# Patient Record
Sex: MALE | NOT HISPANIC OR LATINO | URBAN - METROPOLITAN AREA
[De-identification: names, ages, dates, MRNs, and addresses within clinical notes are randomized per-mention and may not be internally consistent; named-entity substitution may affect disease eponyms.]

---

## 2018-10-31 ENCOUNTER — APPOINTMENT (RX ONLY)
Dept: URBAN - METROPOLITAN AREA CLINIC 38 | Facility: CLINIC | Age: 68
Setting detail: DERMATOLOGY
End: 2018-10-31

## 2018-10-31 DIAGNOSIS — Z85.828 PERSONAL HISTORY OF OTHER MALIGNANT NEOPLASM OF SKIN: ICD-10-CM

## 2018-10-31 PROCEDURE — ? COUNSELING

## 2018-10-31 PROCEDURE — 99202 OFFICE O/P NEW SF 15 MIN: CPT

## 2018-10-31 ASSESSMENT — LOCATION DETAILED DESCRIPTION DERM
LOCATION DETAILED: RIGHT SUPERIOR HELIX
LOCATION DETAILED: RIGHT SCAPHA

## 2018-10-31 ASSESSMENT — LOCATION ZONE DERM
LOCATION ZONE: EAR
LOCATION ZONE: EAR

## 2018-10-31 ASSESSMENT — LOCATION SIMPLE DESCRIPTION DERM
LOCATION SIMPLE: RIGHT EAR
LOCATION SIMPLE: RIGHT EAR

## 2021-06-01 PROBLEM — M48.061 LUMBAR STENOSIS: Status: ACTIVE | Noted: 2021-06-01

## 2021-07-25 ENCOUNTER — APPOINTMENT (OUTPATIENT)
Dept: RADIOLOGY | Facility: MEDICAL CENTER | Age: 71
End: 2021-07-25
Attending: EMERGENCY MEDICINE
Payer: MEDICARE

## 2021-07-25 ENCOUNTER — HOSPITAL ENCOUNTER (OUTPATIENT)
Facility: MEDICAL CENTER | Age: 71
End: 2021-07-25
Attending: EMERGENCY MEDICINE | Admitting: HOSPITALIST
Payer: MEDICARE

## 2021-07-25 ENCOUNTER — HOSPITAL ENCOUNTER (INPATIENT)
Facility: MEDICAL CENTER | Age: 71
LOS: 2 days | DRG: 247 | End: 2021-07-27
Attending: INTERNAL MEDICINE | Admitting: HOSPITALIST
Payer: MEDICARE

## 2021-07-25 VITALS
HEART RATE: 52 BPM | DIASTOLIC BLOOD PRESSURE: 69 MMHG | TEMPERATURE: 97.8 F | WEIGHT: 162.92 LBS | SYSTOLIC BLOOD PRESSURE: 117 MMHG | RESPIRATION RATE: 18 BRPM | OXYGEN SATURATION: 95 % | BODY MASS INDEX: 24.13 KG/M2 | HEIGHT: 69 IN

## 2021-07-25 DIAGNOSIS — R00.1 BRADYCARDIA: ICD-10-CM

## 2021-07-25 DIAGNOSIS — R07.9 ACUTE CHEST PAIN: ICD-10-CM

## 2021-07-25 DIAGNOSIS — I71.20 THORACIC AORTIC ANEURYSM WITHOUT RUPTURE (HCC): ICD-10-CM

## 2021-07-25 DIAGNOSIS — I21.4 NSTEMI (NON-ST ELEVATED MYOCARDIAL INFARCTION) (HCC): ICD-10-CM

## 2021-07-25 PROBLEM — D69.6 THROMBOCYTOPENIA (HCC): Status: ACTIVE | Noted: 2021-07-25

## 2021-07-25 PROBLEM — R07.89 OTHER CHEST PAIN: Status: ACTIVE | Noted: 2021-07-25

## 2021-07-25 LAB
ALBUMIN SERPL BCP-MCNC: 4.2 G/DL (ref 3.2–4.9)
ALBUMIN/GLOB SERPL: 1.6 G/DL
ALP SERPL-CCNC: 76 U/L (ref 30–99)
ALT SERPL-CCNC: 15 U/L (ref 2–50)
ANION GAP SERPL CALC-SCNC: 12 MMOL/L (ref 7–16)
APTT PPP: 27.3 SEC (ref 24.7–36)
AST SERPL-CCNC: 20 U/L (ref 12–45)
BASOPHILS # BLD AUTO: 1.1 % (ref 0–1.8)
BASOPHILS # BLD: 0.05 K/UL (ref 0–0.12)
BILIRUB SERPL-MCNC: 0.7 MG/DL (ref 0.1–1.5)
BUN SERPL-MCNC: 14 MG/DL (ref 8–22)
CALCIUM SERPL-MCNC: 8.8 MG/DL (ref 8.4–10.2)
CHLORIDE SERPL-SCNC: 105 MMOL/L (ref 96–112)
CO2 SERPL-SCNC: 22 MMOL/L (ref 20–33)
CREAT SERPL-MCNC: 1.02 MG/DL (ref 0.5–1.4)
EKG IMPRESSION: NORMAL
EOSINOPHIL # BLD AUTO: 0.25 K/UL (ref 0–0.51)
EOSINOPHIL NFR BLD: 5.5 % (ref 0–6.9)
ERYTHROCYTE [DISTWIDTH] IN BLOOD BY AUTOMATED COUNT: 43.1 FL (ref 35.9–50)
GLOBULIN SER CALC-MCNC: 2.7 G/DL (ref 1.9–3.5)
GLUCOSE SERPL-MCNC: 94 MG/DL (ref 65–99)
HCT VFR BLD AUTO: 43.7 % (ref 42–52)
HGB BLD-MCNC: 15.2 G/DL (ref 14–18)
IMM GRANULOCYTES # BLD AUTO: 0.01 K/UL (ref 0–0.11)
IMM GRANULOCYTES NFR BLD AUTO: 0.2 % (ref 0–0.9)
INR PPP: 1.04 (ref 0.87–1.13)
LIPASE SERPL-CCNC: 44 U/L (ref 7–58)
LYMPHOCYTES # BLD AUTO: 1.29 K/UL (ref 1–4.8)
LYMPHOCYTES NFR BLD: 28.3 % (ref 22–41)
MCH RBC QN AUTO: 33.8 PG (ref 27–33)
MCHC RBC AUTO-ENTMCNC: 34.8 G/DL (ref 33.7–35.3)
MCV RBC AUTO: 97.1 FL (ref 81.4–97.8)
MONOCYTES # BLD AUTO: 0.53 K/UL (ref 0–0.85)
MONOCYTES NFR BLD AUTO: 11.6 % (ref 0–13.4)
NEUTROPHILS # BLD AUTO: 2.43 K/UL (ref 1.82–7.42)
NEUTROPHILS NFR BLD: 53.3 % (ref 44–72)
NRBC # BLD AUTO: 0 K/UL
NRBC BLD-RTO: 0 /100 WBC
PLATELET # BLD AUTO: 150 K/UL (ref 164–446)
PMV BLD AUTO: 9.8 FL (ref 9–12.9)
POTASSIUM SERPL-SCNC: 4.3 MMOL/L (ref 3.6–5.5)
PROT SERPL-MCNC: 6.9 G/DL (ref 6–8.2)
PROTHROMBIN TIME: 12.8 SEC (ref 12–14.6)
RBC # BLD AUTO: 4.5 M/UL (ref 4.7–6.1)
SODIUM SERPL-SCNC: 139 MMOL/L (ref 135–145)
TROPONIN T SERPL-MCNC: 14 NG/L (ref 6–19)
TROPONIN T SERPL-MCNC: 26 NG/L (ref 6–19)
TROPONIN T SERPL-MCNC: 260 NG/L (ref 6–19)
UFH PPP CHRO-ACNC: <0.1 IU/ML
WBC # BLD AUTO: 4.6 K/UL (ref 4.8–10.8)

## 2021-07-25 PROCEDURE — 99223 1ST HOSP IP/OBS HIGH 75: CPT | Performed by: HOSPITALIST

## 2021-07-25 PROCEDURE — 93005 ELECTROCARDIOGRAM TRACING: CPT | Performed by: EMERGENCY MEDICINE

## 2021-07-25 PROCEDURE — 770020 HCHG ROOM/CARE - TELE (206)

## 2021-07-25 PROCEDURE — 94760 N-INVAS EAR/PLS OXIMETRY 1: CPT

## 2021-07-25 PROCEDURE — 83690 ASSAY OF LIPASE: CPT

## 2021-07-25 PROCEDURE — 85730 THROMBOPLASTIN TIME PARTIAL: CPT

## 2021-07-25 PROCEDURE — 74175 CTA ABDOMEN W/CONTRAST: CPT | Mod: ME

## 2021-07-25 PROCEDURE — 99235 HOSP IP/OBS SAME DATE MOD 70: CPT | Performed by: HOSPITALIST

## 2021-07-25 PROCEDURE — 80053 COMPREHEN METABOLIC PANEL: CPT

## 2021-07-25 PROCEDURE — 85520 HEPARIN ASSAY: CPT

## 2021-07-25 PROCEDURE — 93005 ELECTROCARDIOGRAM TRACING: CPT

## 2021-07-25 PROCEDURE — 700111 HCHG RX REV CODE 636 W/ 250 OVERRIDE (IP): Performed by: EMERGENCY MEDICINE

## 2021-07-25 PROCEDURE — G0378 HOSPITAL OBSERVATION PER HR: HCPCS

## 2021-07-25 PROCEDURE — A9270 NON-COVERED ITEM OR SERVICE: HCPCS | Performed by: HOSPITALIST

## 2021-07-25 PROCEDURE — 99223 1ST HOSP IP/OBS HIGH 75: CPT | Performed by: STUDENT IN AN ORGANIZED HEALTH CARE EDUCATION/TRAINING PROGRAM

## 2021-07-25 PROCEDURE — 99285 EMERGENCY DEPT VISIT HI MDM: CPT

## 2021-07-25 PROCEDURE — A9270 NON-COVERED ITEM OR SERVICE: HCPCS | Performed by: EMERGENCY MEDICINE

## 2021-07-25 PROCEDURE — 96374 THER/PROPH/DIAG INJ IV PUSH: CPT

## 2021-07-25 PROCEDURE — 700111 HCHG RX REV CODE 636 W/ 250 OVERRIDE (IP): Performed by: HOSPITALIST

## 2021-07-25 PROCEDURE — 96376 TX/PRO/DX INJ SAME DRUG ADON: CPT | Mod: XU

## 2021-07-25 PROCEDURE — 700117 HCHG RX CONTRAST REV CODE 255: Performed by: EMERGENCY MEDICINE

## 2021-07-25 PROCEDURE — 84484 ASSAY OF TROPONIN QUANT: CPT

## 2021-07-25 PROCEDURE — 700102 HCHG RX REV CODE 250 W/ 637 OVERRIDE(OP): Performed by: HOSPITALIST

## 2021-07-25 PROCEDURE — 700102 HCHG RX REV CODE 250 W/ 637 OVERRIDE(OP): Performed by: EMERGENCY MEDICINE

## 2021-07-25 PROCEDURE — 85025 COMPLETE CBC W/AUTO DIFF WBC: CPT

## 2021-07-25 PROCEDURE — 71045 X-RAY EXAM CHEST 1 VIEW: CPT

## 2021-07-25 PROCEDURE — 85610 PROTHROMBIN TIME: CPT

## 2021-07-25 PROCEDURE — 96375 TX/PRO/DX INJ NEW DRUG ADDON: CPT | Mod: XU

## 2021-07-25 RX ORDER — ASPIRIN 325 MG
325 TABLET ORAL DAILY
Status: DISCONTINUED | OUTPATIENT
Start: 2021-07-25 | End: 2021-07-25

## 2021-07-25 RX ORDER — AMOXICILLIN 250 MG
2 CAPSULE ORAL 2 TIMES DAILY
Status: DISCONTINUED | OUTPATIENT
Start: 2021-07-26 | End: 2021-07-27 | Stop reason: HOSPADM

## 2021-07-25 RX ORDER — ASPIRIN 600 MG/1
300 SUPPOSITORY RECTAL DAILY
Status: DISCONTINUED | OUTPATIENT
Start: 2021-07-25 | End: 2021-07-25

## 2021-07-25 RX ORDER — OXYCODONE HYDROCHLORIDE 10 MG/1
10 TABLET ORAL EVERY 4 HOURS PRN
Status: DISCONTINUED | OUTPATIENT
Start: 2021-07-25 | End: 2021-07-25

## 2021-07-25 RX ORDER — AMOXICILLIN 250 MG
2 CAPSULE ORAL 2 TIMES DAILY
Status: CANCELLED | OUTPATIENT
Start: 2021-07-26

## 2021-07-25 RX ORDER — LABETALOL HYDROCHLORIDE 5 MG/ML
10 INJECTION, SOLUTION INTRAVENOUS EVERY 4 HOURS PRN
Status: CANCELLED | OUTPATIENT
Start: 2021-07-25

## 2021-07-25 RX ORDER — OXYCODONE HYDROCHLORIDE 5 MG/1
5 TABLET ORAL EVERY 4 HOURS PRN
Status: DISCONTINUED | OUTPATIENT
Start: 2021-07-25 | End: 2021-07-25

## 2021-07-25 RX ORDER — ENALAPRILAT 1.25 MG/ML
1.25 INJECTION INTRAVENOUS EVERY 6 HOURS PRN
Status: DISCONTINUED | OUTPATIENT
Start: 2021-07-25 | End: 2021-07-27 | Stop reason: HOSPADM

## 2021-07-25 RX ORDER — OXYCODONE HYDROCHLORIDE 10 MG/1
10 TABLET ORAL EVERY 4 HOURS PRN
Status: DISCONTINUED | OUTPATIENT
Start: 2021-07-25 | End: 2021-07-27 | Stop reason: HOSPADM

## 2021-07-25 RX ORDER — NITROGLYCERIN 0.4 MG/1
0.4 TABLET SUBLINGUAL
Status: DISCONTINUED | OUTPATIENT
Start: 2021-07-25 | End: 2021-07-27 | Stop reason: HOSPADM

## 2021-07-25 RX ORDER — POLYETHYLENE GLYCOL 3350 17 G/17G
1 POWDER, FOR SOLUTION ORAL
Status: DISCONTINUED | OUTPATIENT
Start: 2021-07-25 | End: 2021-07-27 | Stop reason: HOSPADM

## 2021-07-25 RX ORDER — LABETALOL HYDROCHLORIDE 5 MG/ML
10 INJECTION, SOLUTION INTRAVENOUS EVERY 4 HOURS PRN
Status: DISCONTINUED | OUTPATIENT
Start: 2021-07-25 | End: 2021-07-27 | Stop reason: HOSPADM

## 2021-07-25 RX ORDER — HEPARIN SODIUM 5000 [USP'U]/100ML
0-30 INJECTION, SOLUTION INTRAVENOUS CONTINUOUS
Status: DISCONTINUED | OUTPATIENT
Start: 2021-07-25 | End: 2021-07-25

## 2021-07-25 RX ORDER — ASPIRIN 600 MG/1
300 SUPPOSITORY RECTAL DAILY
Status: CANCELLED | OUTPATIENT
Start: 2021-07-26

## 2021-07-25 RX ORDER — POLYETHYLENE GLYCOL 3350 17 G/17G
1 POWDER, FOR SOLUTION ORAL
Status: DISCONTINUED | OUTPATIENT
Start: 2021-07-25 | End: 2021-07-25

## 2021-07-25 RX ORDER — ENALAPRILAT 1.25 MG/ML
1.25 INJECTION INTRAVENOUS EVERY 6 HOURS PRN
Status: DISCONTINUED | OUTPATIENT
Start: 2021-07-25 | End: 2021-07-25

## 2021-07-25 RX ORDER — POLYETHYLENE GLYCOL 3350 17 G/17G
1 POWDER, FOR SOLUTION ORAL
Status: CANCELLED | OUTPATIENT
Start: 2021-07-25

## 2021-07-25 RX ORDER — HEPARIN SODIUM 1000 [USP'U]/ML
4000 INJECTION, SOLUTION INTRAVENOUS; SUBCUTANEOUS ONCE
Status: CANCELLED | OUTPATIENT
Start: 2021-07-25 | End: 2021-07-26

## 2021-07-25 RX ORDER — HEPARIN SODIUM 5000 [USP'U]/100ML
0-30 INJECTION, SOLUTION INTRAVENOUS CONTINUOUS
Status: DISCONTINUED | OUTPATIENT
Start: 2021-07-25 | End: 2021-07-26

## 2021-07-25 RX ORDER — ACETAMINOPHEN 325 MG/1
650 TABLET ORAL EVERY 6 HOURS PRN
Status: DISCONTINUED | OUTPATIENT
Start: 2021-07-25 | End: 2021-07-27 | Stop reason: HOSPADM

## 2021-07-25 RX ORDER — MORPHINE SULFATE 4 MG/ML
2-4 INJECTION, SOLUTION INTRAMUSCULAR; INTRAVENOUS
Status: CANCELLED | OUTPATIENT
Start: 2021-07-25

## 2021-07-25 RX ORDER — ASPIRIN 81 MG/1
324 TABLET, CHEWABLE ORAL DAILY
Status: DISCONTINUED | OUTPATIENT
Start: 2021-07-25 | End: 2021-07-25

## 2021-07-25 RX ORDER — OXYCODONE HYDROCHLORIDE 5 MG/1
5 TABLET ORAL EVERY 4 HOURS PRN
Status: DISCONTINUED | OUTPATIENT
Start: 2021-07-25 | End: 2021-07-27 | Stop reason: HOSPADM

## 2021-07-25 RX ORDER — KETOROLAC TROMETHAMINE 30 MG/ML
15 INJECTION, SOLUTION INTRAMUSCULAR; INTRAVENOUS ONCE
Status: COMPLETED | OUTPATIENT
Start: 2021-07-25 | End: 2021-07-25

## 2021-07-25 RX ORDER — ONDANSETRON 2 MG/ML
4 INJECTION INTRAMUSCULAR; INTRAVENOUS EVERY 4 HOURS PRN
Status: DISCONTINUED | OUTPATIENT
Start: 2021-07-25 | End: 2021-07-25

## 2021-07-25 RX ORDER — BISACODYL 10 MG
10 SUPPOSITORY, RECTAL RECTAL
Status: DISCONTINUED | OUTPATIENT
Start: 2021-07-25 | End: 2021-07-25

## 2021-07-25 RX ORDER — ACETAMINOPHEN 325 MG/1
650 TABLET ORAL EVERY 6 HOURS PRN
Status: CANCELLED | OUTPATIENT
Start: 2021-07-25

## 2021-07-25 RX ORDER — AMOXICILLIN 250 MG
2 CAPSULE ORAL 2 TIMES DAILY
Status: DISCONTINUED | OUTPATIENT
Start: 2021-07-25 | End: 2021-07-25

## 2021-07-25 RX ORDER — ONDANSETRON 4 MG/1
4 TABLET, ORALLY DISINTEGRATING ORAL EVERY 4 HOURS PRN
Status: CANCELLED | OUTPATIENT
Start: 2021-07-25

## 2021-07-25 RX ORDER — HEPARIN SODIUM 1000 [USP'U]/ML
2000 INJECTION, SOLUTION INTRAVENOUS; SUBCUTANEOUS PRN
Status: CANCELLED | OUTPATIENT
Start: 2021-07-25

## 2021-07-25 RX ORDER — ASPIRIN 81 MG/1
324 TABLET, CHEWABLE ORAL DAILY
Status: CANCELLED | OUTPATIENT
Start: 2021-07-26

## 2021-07-25 RX ORDER — ATORVASTATIN CALCIUM 40 MG/1
80 TABLET, FILM COATED ORAL EVERY EVENING
Status: DISCONTINUED | OUTPATIENT
Start: 2021-07-25 | End: 2021-07-25

## 2021-07-25 RX ORDER — HEPARIN SODIUM 1000 [USP'U]/ML
2000 INJECTION, SOLUTION INTRAVENOUS; SUBCUTANEOUS PRN
Status: DISCONTINUED | OUTPATIENT
Start: 2021-07-25 | End: 2021-07-25

## 2021-07-25 RX ORDER — ASPIRIN 81 MG/1
324 TABLET, CHEWABLE ORAL DAILY
Status: DISCONTINUED | OUTPATIENT
Start: 2021-07-26 | End: 2021-07-26

## 2021-07-25 RX ORDER — BISACODYL 10 MG
10 SUPPOSITORY, RECTAL RECTAL
Status: CANCELLED | OUTPATIENT
Start: 2021-07-25

## 2021-07-25 RX ORDER — NITROGLYCERIN 0.4 MG/1
0.4 TABLET SUBLINGUAL
Status: CANCELLED | OUTPATIENT
Start: 2021-07-25

## 2021-07-25 RX ORDER — HEPARIN SODIUM 1000 [USP'U]/ML
4000 INJECTION, SOLUTION INTRAVENOUS; SUBCUTANEOUS ONCE
Status: DISCONTINUED | OUTPATIENT
Start: 2021-07-25 | End: 2021-07-25

## 2021-07-25 RX ORDER — ACETAMINOPHEN 325 MG/1
650 TABLET ORAL EVERY 6 HOURS PRN
Status: DISCONTINUED | OUTPATIENT
Start: 2021-07-25 | End: 2021-07-25

## 2021-07-25 RX ORDER — OXYCODONE HYDROCHLORIDE 10 MG/1
10 TABLET ORAL EVERY 4 HOURS PRN
Status: CANCELLED | OUTPATIENT
Start: 2021-07-25

## 2021-07-25 RX ORDER — BISACODYL 10 MG
10 SUPPOSITORY, RECTAL RECTAL
Status: DISCONTINUED | OUTPATIENT
Start: 2021-07-25 | End: 2021-07-27 | Stop reason: HOSPADM

## 2021-07-25 RX ORDER — ONDANSETRON 4 MG/1
4 TABLET, ORALLY DISINTEGRATING ORAL EVERY 4 HOURS PRN
Status: DISCONTINUED | OUTPATIENT
Start: 2021-07-25 | End: 2021-07-27 | Stop reason: HOSPADM

## 2021-07-25 RX ORDER — NITROGLYCERIN 0.4 MG/1
0.4 TABLET SUBLINGUAL
Status: DISCONTINUED | OUTPATIENT
Start: 2021-07-25 | End: 2021-07-25

## 2021-07-25 RX ORDER — HEPARIN SODIUM 1000 [USP'U]/ML
2000 INJECTION, SOLUTION INTRAVENOUS; SUBCUTANEOUS PRN
Status: DISCONTINUED | OUTPATIENT
Start: 2021-07-25 | End: 2021-07-27 | Stop reason: HOSPADM

## 2021-07-25 RX ORDER — ATORVASTATIN CALCIUM 80 MG/1
80 TABLET, FILM COATED ORAL EVERY EVENING
Status: DISCONTINUED | OUTPATIENT
Start: 2021-07-25 | End: 2021-07-27 | Stop reason: HOSPADM

## 2021-07-25 RX ORDER — CARVEDILOL 6.25 MG/1
6.25 TABLET ORAL 2 TIMES DAILY WITH MEALS
Status: CANCELLED | OUTPATIENT
Start: 2021-07-25

## 2021-07-25 RX ORDER — MORPHINE SULFATE 4 MG/ML
2-4 INJECTION, SOLUTION INTRAMUSCULAR; INTRAVENOUS
Status: DISCONTINUED | OUTPATIENT
Start: 2021-07-25 | End: 2021-07-25

## 2021-07-25 RX ORDER — ASPIRIN 325 MG
325 TABLET ORAL DAILY
Status: CANCELLED | OUTPATIENT
Start: 2021-07-26

## 2021-07-25 RX ORDER — CARVEDILOL 6.25 MG/1
6.25 TABLET ORAL 2 TIMES DAILY WITH MEALS
Status: DISCONTINUED | OUTPATIENT
Start: 2021-07-26 | End: 2021-07-27

## 2021-07-25 RX ORDER — ASPIRIN 300 MG/1
300 SUPPOSITORY RECTAL DAILY
Status: DISCONTINUED | OUTPATIENT
Start: 2021-07-26 | End: 2021-07-26

## 2021-07-25 RX ORDER — ASPIRIN 81 MG/1
324 TABLET, CHEWABLE ORAL ONCE
Status: COMPLETED | OUTPATIENT
Start: 2021-07-25 | End: 2021-07-25

## 2021-07-25 RX ORDER — SILDENAFIL CITRATE 20 MG/1
20 TABLET ORAL PRN
COMMUNITY
Start: 2021-02-19 | End: 2021-08-10

## 2021-07-25 RX ORDER — ATORVASTATIN CALCIUM 40 MG/1
80 TABLET, FILM COATED ORAL EVERY EVENING
Status: CANCELLED | OUTPATIENT
Start: 2021-07-25

## 2021-07-25 RX ORDER — ONDANSETRON 2 MG/ML
4 INJECTION INTRAMUSCULAR; INTRAVENOUS EVERY 4 HOURS PRN
Status: CANCELLED | OUTPATIENT
Start: 2021-07-25

## 2021-07-25 RX ORDER — ONDANSETRON 4 MG/1
4 TABLET, ORALLY DISINTEGRATING ORAL EVERY 4 HOURS PRN
Status: DISCONTINUED | OUTPATIENT
Start: 2021-07-25 | End: 2021-07-25

## 2021-07-25 RX ORDER — HEPARIN SODIUM 1000 [USP'U]/ML
4000 INJECTION, SOLUTION INTRAVENOUS; SUBCUTANEOUS ONCE
Status: DISCONTINUED | OUTPATIENT
Start: 2021-07-25 | End: 2021-07-26

## 2021-07-25 RX ORDER — ONDANSETRON 2 MG/ML
4 INJECTION INTRAMUSCULAR; INTRAVENOUS EVERY 4 HOURS PRN
Status: DISCONTINUED | OUTPATIENT
Start: 2021-07-25 | End: 2021-07-27 | Stop reason: HOSPADM

## 2021-07-25 RX ORDER — CARVEDILOL 6.25 MG/1
6.25 TABLET ORAL 2 TIMES DAILY WITH MEALS
Status: DISCONTINUED | OUTPATIENT
Start: 2021-07-25 | End: 2021-07-25

## 2021-07-25 RX ORDER — OXYCODONE HYDROCHLORIDE 5 MG/1
5 TABLET ORAL EVERY 4 HOURS PRN
Status: CANCELLED | OUTPATIENT
Start: 2021-07-25

## 2021-07-25 RX ORDER — ASPIRIN 325 MG
325 TABLET ORAL DAILY
Status: DISCONTINUED | OUTPATIENT
Start: 2021-07-26 | End: 2021-07-26

## 2021-07-25 RX ORDER — MORPHINE SULFATE 4 MG/ML
4 INJECTION, SOLUTION INTRAMUSCULAR; INTRAVENOUS ONCE
Status: COMPLETED | OUTPATIENT
Start: 2021-07-25 | End: 2021-07-25

## 2021-07-25 RX ORDER — MORPHINE SULFATE 4 MG/ML
2-4 INJECTION, SOLUTION INTRAMUSCULAR; INTRAVENOUS
Status: DISCONTINUED | OUTPATIENT
Start: 2021-07-25 | End: 2021-07-27 | Stop reason: HOSPADM

## 2021-07-25 RX ORDER — HEPARIN SODIUM 5000 [USP'U]/100ML
0-30 INJECTION, SOLUTION INTRAVENOUS CONTINUOUS
Status: CANCELLED | OUTPATIENT
Start: 2021-07-25

## 2021-07-25 RX ORDER — LABETALOL HYDROCHLORIDE 5 MG/ML
10 INJECTION, SOLUTION INTRAVENOUS EVERY 4 HOURS PRN
Status: DISCONTINUED | OUTPATIENT
Start: 2021-07-25 | End: 2021-07-25

## 2021-07-25 RX ORDER — ENALAPRILAT 1.25 MG/ML
1.25 INJECTION INTRAVENOUS EVERY 6 HOURS PRN
Status: CANCELLED | OUTPATIENT
Start: 2021-07-25

## 2021-07-25 RX ADMIN — HEPARIN SODIUM 12 UNITS/KG/HR: 5000 INJECTION, SOLUTION INTRAVENOUS at 18:18

## 2021-07-25 RX ADMIN — HEPARIN SODIUM 12 UNITS/KG/HR: 5000 INJECTION, SOLUTION INTRAVENOUS at 22:42

## 2021-07-25 RX ADMIN — MORPHINE SULFATE 4 MG: 4 INJECTION INTRAVENOUS at 07:39

## 2021-07-25 RX ADMIN — CARVEDILOL 6.25 MG: 6.25 TABLET, FILM COATED ORAL at 18:15

## 2021-07-25 RX ADMIN — NITROGLYCERIN 0.4 MG: 0.4 TABLET, ORALLY DISINTEGRATING SUBLINGUAL at 06:44

## 2021-07-25 RX ADMIN — ASPIRIN 324 MG: 81 TABLET, CHEWABLE ORAL at 06:44

## 2021-07-25 RX ADMIN — ATORVASTATIN CALCIUM 80 MG: 40 TABLET, FILM COATED ORAL at 18:15

## 2021-07-25 RX ADMIN — HEPARIN SODIUM 4000 UNITS: 1000 INJECTION, SOLUTION INTRAVENOUS; SUBCUTANEOUS at 18:21

## 2021-07-25 RX ADMIN — NITROGLYCERIN 0.4 MG: 0.4 TABLET, ORALLY DISINTEGRATING SUBLINGUAL at 06:49

## 2021-07-25 RX ADMIN — IOHEXOL 100 ML: 350 INJECTION, SOLUTION INTRAVENOUS at 07:22

## 2021-07-25 RX ADMIN — LIDOCAINE HYDROCHLORIDE 15 ML: 20 SOLUTION OROPHARYNGEAL at 09:20

## 2021-07-25 RX ADMIN — KETOROLAC TROMETHAMINE 15 MG: 30 INJECTION, SOLUTION INTRAMUSCULAR; INTRAVENOUS at 12:03

## 2021-07-25 ASSESSMENT — LIFESTYLE VARIABLES
HAVE PEOPLE ANNOYED YOU BY CRITICIZING YOUR DRINKING: NO
ALCOHOL_USE: YES
EVER HAD A DRINK FIRST THING IN THE MORNING TO STEADY YOUR NERVES TO GET RID OF A HANGOVER: NO
DOES PATIENT WANT TO STOP DRINKING: YES
ON A TYPICAL DAY WHEN YOU DRINK ALCOHOL HOW MANY DRINKS DO YOU HAVE: 2
AVERAGE NUMBER OF DAYS PER WEEK YOU HAVE A DRINK CONTAINING ALCOHOL: 7
AVERAGE NUMBER OF DAYS PER WEEK YOU HAVE A DRINK CONTAINING ALCOHOL: 7
TOTAL SCORE: 2
TOTAL SCORE: 1
TOTAL SCORE: 1
ON A TYPICAL DAY WHEN YOU DRINK ALCOHOL HOW MANY DRINKS DO YOU HAVE: 2
HAVE YOU EVER FELT YOU SHOULD CUT DOWN ON YOUR DRINKING: YES
HOW MANY TIMES IN THE PAST YEAR HAVE YOU HAD 5 OR MORE DRINKS IN A DAY: 0
HOW MANY TIMES IN THE PAST YEAR HAVE YOU HAD 5 OR MORE DRINKS IN A DAY: 1
DOES PATIENT WANT TO TALK TO SOMEONE ABOUT QUITTING: NO
EVER HAD A DRINK FIRST THING IN THE MORNING TO STEADY YOUR NERVES TO GET RID OF A HANGOVER: NO
CONSUMPTION TOTAL: POSITIVE
EVER FELT BAD OR GUILTY ABOUT YOUR DRINKING: YES
CONSUMPTION TOTAL: NEGATIVE
ALCOHOL_USE: YES
TOTAL SCORE: 1
TOTAL SCORE: 2
TOTAL SCORE: 2
HAVE PEOPLE ANNOYED YOU BY CRITICIZING YOUR DRINKING: NO
HAVE YOU EVER FELT YOU SHOULD CUT DOWN ON YOUR DRINKING: YES
EVER FELT BAD OR GUILTY ABOUT YOUR DRINKING: NO

## 2021-07-25 ASSESSMENT — COGNITIVE AND FUNCTIONAL STATUS - GENERAL
MOBILITY SCORE: 24
MOBILITY SCORE: 24
SUGGESTED CMS G CODE MODIFIER DAILY ACTIVITY: CH
SUGGESTED CMS G CODE MODIFIER DAILY ACTIVITY: CH
DAILY ACTIVITIY SCORE: 24
SUGGESTED CMS G CODE MODIFIER MOBILITY: CH
SUGGESTED CMS G CODE MODIFIER MOBILITY: CH
DAILY ACTIVITIY SCORE: 24

## 2021-07-25 ASSESSMENT — FIBROSIS 4 INDEX
FIB4 SCORE: 2.38
FIB4 SCORE: 2.38

## 2021-07-25 ASSESSMENT — ENCOUNTER SYMPTOMS
CHILLS: 0
NECK PAIN: 1
HEARTBURN: 0
TINGLING: 0
VOMITING: 0
TREMORS: 0
CONSTIPATION: 0
SENSORY CHANGE: 0
MYALGIAS: 1
ABDOMINAL PAIN: 0
NAUSEA: 0
DIARRHEA: 0
FEVER: 0

## 2021-07-25 ASSESSMENT — HEART SCORE
RISK FACTORS: NO KNOWN RISK FACTORS
HEART SCORE: 4
AGE: 65+
HISTORY: MODERATELY SUSPICIOUS
ECG: NON-SPECIFIC REPOLARIZATION DISTURBANCE
TROPONIN: LESS THAN OR EQUAL TO NORMAL LIMIT

## 2021-07-25 ASSESSMENT — PAIN DESCRIPTION - PAIN TYPE
TYPE: ACUTE PAIN

## 2021-07-25 ASSESSMENT — PATIENT HEALTH QUESTIONNAIRE - PHQ9
2. FEELING DOWN, DEPRESSED, IRRITABLE, OR HOPELESS: NOT AT ALL
1. LITTLE INTEREST OR PLEASURE IN DOING THINGS: NOT AT ALL
2. FEELING DOWN, DEPRESSED, IRRITABLE, OR HOPELESS: NOT AT ALL
SUM OF ALL RESPONSES TO PHQ9 QUESTIONS 1 AND 2: 0
SUM OF ALL RESPONSES TO PHQ9 QUESTIONS 1 AND 2: 0
1. LITTLE INTEREST OR PLEASURE IN DOING THINGS: NOT AT ALL

## 2021-07-25 NOTE — ED NOTES
0715 Report received from [facility] care at this time  0815 Admitting orders received. Waiting for bed assignment.

## 2021-07-25 NOTE — H&P
"Hospital Medicine History & Physical Note    Date of Service  7/25/2021    Primary Care Physician  Diamond Clayton M.D.    Consultants  none    Code Status  Full Code    Chief Complaint  Chief Complaint   Patient presents with   • Chest Pain     \"dull\", intermittent since yesterday, awoke him from sleep this morning.       History of Presenting Illness  Vasile Bowers is a 70 y.o. male who presented 7/25/2021 with chest pain, he reports chest pain, then migratory pain to his shoulders and neck, occasionally radiating to his back.  Has never felt like this before, no previous MI. Overall feels he is a healthy person, recently moved and was lifting some heavy objects but he reports that was weeks ago. Pain work him from his sleep and he cannot get comfortable at home and here is the same. He is sitting up and trying to stretch but it's still achy dull and uncomfortable. It is not worse with exertion.  He has not tried any medications to alleviate his symptoms.  Has not had any leg swelling or calf pain.  No fevers or cough.  No vomiting or diarrhea.  No abdominal pain.  No back pain.    Due to back pain, CTA obtained, No PE, small aneurysm,  Mild aneurysmal dilatation of the ascending aorta measured at 4.2 x 4.1 cm.    Smoked >40 years ago and infrequent. No FHx.      I discussed the plan of care with patient and bedside RN.    Review of Systems  Review of Systems   Constitutional: Positive for malaise/fatigue. Negative for chills and fever.   Cardiovascular: Positive for chest pain.   Gastrointestinal: Negative for abdominal pain, constipation, diarrhea, heartburn, nausea and vomiting.   Musculoskeletal: Positive for myalgias and neck pain.   Skin: Negative for itching and rash.   Neurological: Negative for tingling, tremors and sensory change.   All other systems reviewed and are negative.      Past Medical History   has no past medical history on file.    Surgical History   has a past surgical history that " includes knee arthroscopy and shoulder arthroscopy.     Family History  family history includes Heart Disease in his mother; Heart Failure in his mother.   Family history reviewed with patient. There is family history that is pertinent to the chief complaint.     Social History   reports that he has quit smoking. He has never used smokeless tobacco. He reports current alcohol use. He reports that he does not use drugs.    Allergies  No Known Allergies    Medications  Prior to Admission Medications   Prescriptions Last Dose Informant Patient Reported? Taking?   Ascorbic Acid (VITAMIN C PO) > 2 days at AM Patient Yes Yes   Sig: Take 1 tablet by mouth every day.   Cholecalciferol (D3 PO) > 2 days at AM Patient Yes Yes   Sig: Take 1 capsule by mouth every day.   Cyanocobalamin (B-12 PO) >2 days at AM Patient Yes Yes   Sig: Take 1 tablet by mouth every day.   sildenafil (REVATIO) 20 MG tablet 7/24/2021 at 1500 Patient Yes Yes   Sig: Take 20 mg by mouth as needed (For erectile dysfunction). For erectile dysfunction      Facility-Administered Medications: None       Physical Exam  Temp:  [36 °C (96.8 °F)-36.3 °C (97.4 °F)] 36.3 °C (97.4 °F)  Pulse:  [47-48] 48  Resp:  [16-17] 16  BP: (129-145)/(76-77) 129/77  SpO2:  [98 %] 98 %    Physical Exam  Vitals and nursing note reviewed.   Constitutional:       General: He is not in acute distress.     Appearance: Normal appearance. He is not diaphoretic.   HENT:      Head: Normocephalic and atraumatic.      Nose: No congestion.      Mouth/Throat:      Mouth: Mucous membranes are moist.   Eyes:      General:         Right eye: No discharge.         Left eye: No discharge.      Extraocular Movements: Extraocular movements intact.      Conjunctiva/sclera: Conjunctivae normal.   Cardiovascular:      Rate and Rhythm: Normal rate.      Pulses: Normal pulses.   Pulmonary:      Effort: Pulmonary effort is normal. No respiratory distress.      Breath sounds: No wheezing.   Abdominal:       General: Abdomen is flat. There is no distension.      Palpations: Abdomen is soft.      Tenderness: There is no abdominal tenderness. There is no guarding.   Musculoskeletal:         General: No swelling or deformity. Normal range of motion.      Cervical back: Normal range of motion. No rigidity.   Skin:     General: Skin is warm and dry.      Coloration: Skin is not jaundiced.      Findings: No bruising or lesion.   Neurological:      General: No focal deficit present.      Mental Status: He is alert and oriented to person, place, and time.      Cranial Nerves: No cranial nerve deficit.      Motor: No weakness.   Psychiatric:         Mood and Affect: Mood normal.         Thought Content: Thought content normal.         Laboratory:  Recent Labs     07/25/21  0611   WBC 4.6*   RBC 4.50*   HEMOGLOBIN 15.2   HEMATOCRIT 43.7   MCV 97.1   MCH 33.8*   MCHC 34.8   RDW 43.1   PLATELETCT 150*   MPV 9.8     Recent Labs     07/25/21  0611   SODIUM 139   POTASSIUM 4.3   CHLORIDE 105   CO2 22   GLUCOSE 94   BUN 14   CREATININE 1.02   CALCIUM 8.8     Recent Labs     07/25/21  0611   ALTSGPT 15   ASTSGOT 20   ALKPHOSPHAT 76   TBILIRUBIN 0.7   LIPASE 44   GLUCOSE 94         No results for input(s): NTPROBNP in the last 72 hours.      Recent Labs     07/25/21  0611 07/25/21  1053   TROPONINT 14 26*       Imaging:  CT-CTA COMPLETE THORACOABDOMINAL AORTA   Final Result      1.  Mild aneurysmal dilatation of the ascending aorta measured at 4.2 x 4.1 cm.      2.  No evidence of aortic dissection.      3.  Fatty change of the liver.                        DX-CHEST-PORTABLE (1 VIEW)   Final Result      No evidence of acute cardiopulmonary process.      NM-CARDIAC STRESS TEST    (Results Pending)       X-Ray:  I have personally reviewed the images and compared with prior images.  EKG:  I have personally reviewed the images and compared with prior images.    Assessment/Plan:  I anticipate this patient is appropriate for observation status  at this time.    * Other chest pain  Assessment & Plan  Woke him from sleep, 1st trop negative and EKG nonischemic  HEART score 3-4  Negative murphys sign, no relation to food  No family history, remote tobacco use  Continue on telemetry, check treadmill ST  Trend labs  Toradol 15mg x 1, other pain medications ordered  ASA given in ED, holding on BB for now  Check lipid panel in AM    Thrombocytopenia (HCC)  Assessment & Plan  Very mild, 150k on admission  Associated with a slightly low WBC 4.6k  Follow up AM labs      VTE prophylaxis: enoxaparin ppx

## 2021-07-25 NOTE — PROGRESS NOTES
Telemetry Strip     Strip printed: 1403  Measurements from am strip were as follows:  Rhythm: SB-SR  HR: 40s-60s  Measurements: 0.18/ 0.10/ 0.46  Ectopy: r PVC, o PAC, HR megha down to 35             Normal Values  Rhythm SR  HR Range    Measurements 0.12-0.20 / 0.06-0.10  / 0.30-0.52

## 2021-07-25 NOTE — PROGRESS NOTES
4 Eyes Skin Assessment Completed by ALEXSANDRA Rojas and ALEXSANDRA Jackson.    Head WDL  Ears WDL  Nose WDL  Mouth WDL  Neck WDL  Breast/Chest WDL  Shoulder Blades WDL  Spine WDL  (R) Arm/Elbow/Hand WDL  (L) Arm/Elbow/Hand WDL  Abdomen WDL  Groin WDL  Scrotum/Coccyx/Buttocks WDL  (R) Leg WDL  (L) Leg WDL  (R) Heel/Foot/Toe WDL  (L) Heel/Foot/Toe WDL          Devices In Places Tele Box      Interventions In Place N/A    Possible Skin Injury No    Pictures Uploaded Into Epic N/A  Wound Consult Placed N/A  RN Wound Prevention Protocol Ordered No

## 2021-07-25 NOTE — ASSESSMENT & PLAN NOTE
Woke him from sleep, 1st trop negative and EKG nonischemic  HEART score 3-4  Negative murphys sign, no relation to food  No family history, remote tobacco use  Continue on telemetry, check treadmill ST  Trend labs  Toradol 15mg x 1, other pain medications ordered  ASA given in ED, holding on BB for now  Check lipid panel in AM

## 2021-07-25 NOTE — PROGRESS NOTES
Pt arrives to unit from ER to unit via gurney. Ambulated from gurney to bed, accompanied by spouse.  Pt A&Ox 4 , pt reports 4/10 chest pain.  Tele monitor applied, vitals taken. History, allergies, and med rec reviewed and admission profile completed.      Pt oriented to unit and plan of care discussed. Welcome folder provided and discussed. Communication board filled out. Pt instructed on call light usage and encouraged to call for any questions, needs, or concerns and prior to getting out of bed. Fall precautions in place. Pt agrees with the plan and declines any needs at this time. Bed locked and in lowest position.

## 2021-07-25 NOTE — CARE PLAN
Problem: Pain - Standard  Goal: Alleviation of pain or a reduction in pain to the patient’s comfort goal  Outcome: Progressing     Problem: Knowledge Deficit - Standard  Goal: Patient and family/care givers will demonstrate understanding of plan of care, disease process/condition, diagnostic tests and medications  Outcome: Progressing   The patient is Stable - Low risk of patient condition declining or worsening         Progress made toward(s) clinical / shift goals:  pt updated on plan of care, pt reported 4/10 pain in chest declines interventions at this time.     Patient is not progressing towards the following goals:

## 2021-07-25 NOTE — ED PROVIDER NOTES
"ED Provider Note  CHIEF COMPLAINT  Chief Complaint   Patient presents with   • Chest Pain     \"dull\", intermittent since yesterday, awoke him from sleep this morning.       HPI  Vasile Alpesh Bowers is a 70 y.o. male who presents with a persistent substernal chest pain.  Started yesterday afternoon.  Comes and goes.  Awoke him from sleep last night and therefore he decided to come in.  No history of lung or heart problems.  Nothing seems to make it better or worse.  It is not worse with exertion.  He has not tried any medications to alleviate his symptoms.  Has not had any leg swelling or calf pain.  No fevers or cough.  No vomiting or diarrhea.  No abdominal pain.  No back pain.    CAD Risk factor review:  Denied history of CAD, hyperlipidemia, diabetes, hypertension,  tobacco. Denies methamphetamine and cocaine.  Family history negative for early CAD.    Aortic Dissection risk factors review: No sudden severe tearing pain. No radiation to the back. No neuro symptoms, No known aortic valvular abnormality or aortic aneurysm. No known connective tissue disorder.     Pulmonary Embolism risk factor review:  No recent surgery, No unilateral lower extremity. No history of DVT or PE. Denies hemoptysis, denies current unilateral leg swelling. No history of malignancy.  No tachycardia or hypoxia.    Other review: no fevers, IVDA, positional symptoms, no abdominal pain     REVIEW OF SYSTEMS  As per HPI, otherwise a 10 point review of systems is negative    PAST MEDICAL HISTORY  History reviewed. No pertinent past medical history.    SOCIAL HISTORY  Social History     Tobacco Use   • Smoking status: Former Smoker   • Smokeless tobacco: Never Used   Substance Use Topics   • Alcohol use: Yes   • Drug use: No       SURGICAL HISTORY  Past Surgical History:   Procedure Laterality Date   • KNEE ARTHROSCOPY     • SHOULDER ARTHROSCOPY         CURRENT MEDICATIONS  Home Medications     Reviewed by Anil Trujillo R.N. (Registered " "Nurse) on 07/25/21 at 0610  Med List Status: Complete   Medication Last Dose Status   sulfamethoxazole-trimethoprim (BACTRIM DS) 800-160 MG per tablet Not Taking Active                ALLERGIES  No Known Allergies    PHYSICAL EXAM  VITAL SIGNS: /76   Pulse (!) 47   Temp 36 °C (96.8 °F) (Temporal)   Resp 16   Ht 1.753 m (5' 9\")   Wt 74.3 kg (163 lb 12.8 oz)   SpO2 98%   BMI 24.19 kg/m²    Constitutional: Awake and alert  HENT:  Atraumatic, Normocephalic.Oropharynx dry mucus membranes, Nose normal inspection.   Eyes: Normal inspection  Neck: Supple  Cardiovascular: bradycardic heart rate, Normal rhythm.  Symmetric peripheral pulses.   Thorax & Lungs: No respiratory distress, No wheezing, No rales, No rhonchi, No chest tenderness.   Abdomen: Bowel sounds normal, soft, non-distended, nontender, no mass  Skin: Warm, Dry, No rash.   Back: No tenderness, No CVA tenderness.   Extremities: No clubbing, cyanosis, edema, no Homans or cords   Neurologic: Grossly normal   Psychiatric: Anxious appearing    RADIOLOGY/PROCEDURES  CT-CTA COMPLETE THORACOABDOMINAL AORTA   Final Result      1.  Mild aneurysmal dilatation of the ascending aorta measured at 4.2 x 4.1 cm.      2.  No evidence of aortic dissection.      3.  Fatty change of the liver.                        DX-CHEST-PORTABLE (1 VIEW)   Final Result      No evidence of acute cardiopulmonary process.           Imaging is interpreted by radiologist    Labs:  Results for orders placed or performed during the hospital encounter of 07/25/21   CBC w/ Differential   Result Value Ref Range    WBC 4.6 (L) 4.8 - 10.8 K/uL    RBC 4.50 (L) 4.70 - 6.10 M/uL    Hemoglobin 15.2 14.0 - 18.0 g/dL    Hematocrit 43.7 42.0 - 52.0 %    MCV 97.1 81.4 - 97.8 fL    MCH 33.8 (H) 27.0 - 33.0 pg    MCHC 34.8 33.7 - 35.3 g/dL    RDW 43.1 35.9 - 50.0 fL    Platelet Count 150 (L) 164 - 446 K/uL    MPV 9.8 9.0 - 12.9 fL    Neutrophils-Polys 53.30 44.00 - 72.00 %    Lymphocytes 28.30 22.00 - " 41.00 %    Monocytes 11.60 0.00 - 13.40 %    Eosinophils 5.50 0.00 - 6.90 %    Basophils 1.10 0.00 - 1.80 %    Immature Granulocytes 0.20 0.00 - 0.90 %    Nucleated RBC 0.00 /100 WBC    Neutrophils (Absolute) 2.43 1.82 - 7.42 K/uL    Lymphs (Absolute) 1.29 1.00 - 4.80 K/uL    Monos (Absolute) 0.53 0.00 - 0.85 K/uL    Eos (Absolute) 0.25 0.00 - 0.51 K/uL    Baso (Absolute) 0.05 0.00 - 0.12 K/uL    Immature Granulocytes (abs) 0.01 0.00 - 0.11 K/uL    NRBC (Absolute) 0.00 K/uL   Complete Metabolic Panel (CMP)   Result Value Ref Range    Sodium 139 135 - 145 mmol/L    Potassium 4.3 3.6 - 5.5 mmol/L    Chloride 105 96 - 112 mmol/L    Co2 22 20 - 33 mmol/L    Anion Gap 12.0 7.0 - 16.0    Glucose 94 65 - 99 mg/dL    Bun 14 8 - 22 mg/dL    Creatinine 1.02 0.50 - 1.40 mg/dL    Calcium 8.8 8.4 - 10.2 mg/dL    AST(SGOT) 20 12 - 45 U/L    ALT(SGPT) 15 2 - 50 U/L    Alkaline Phosphatase 76 30 - 99 U/L    Total Bilirubin 0.7 0.1 - 1.5 mg/dL    Albumin 4.2 3.2 - 4.9 g/dL    Total Protein 6.9 6.0 - 8.2 g/dL    Globulin 2.7 1.9 - 3.5 g/dL    A-G Ratio 1.6 g/dL   Troponin STAT   Result Value Ref Range    Troponin T 14 6 - 19 ng/L   Lipase   Result Value Ref Range    Lipase 44 7 - 58 U/L   ESTIMATED GFR   Result Value Ref Range    GFR If African American >60 >60 mL/min/1.73 m 2    GFR If Non African American >60 >60 mL/min/1.73 m 2   EKG   Result Value Ref Range    Report       [facility] Emergency Dept.    Test Date:  2021  Pt Name:    EDY ARORA               Department: [facility]  MRN:        [id number]                      Room:  Gender:     Male                         Technician: BELLA  :        1950                   Requested By:ER TRIAGE PROTOCOL  Order #:    [id number]                    Reading MD: Patti Hussein    Measurements  Intervals                                Axis  Rate:       43                           P:          14  NV:         187                          QRS:         -38  QRSD:       117                          T:          13  QT:         481  QTc:        407    Interpretive Statements  Sinus bradycardia  Nonspecific IVCD with LAD  Low voltage, precordial leads  No previous ECG available for comparison  Electronically Signed On 7- 6:46:39 PDT by Patti Hussein           COURSE & MEDICAL DECISION MAKING  Patient presents emergency department with chest pain.  Started yesterday.  Midsternal area.  No worsening with exertion.  No cardiac risk factors.  No hypoxia or respiratory distress.  No leg swelling or calf pain I do not suspect PE.  No fevers or cough to suggest infectious etiology.  No ripping or tearing back pain.  Plan is to give nitro he does have 3 out of 10 pain currently check labs and continue to monitor.  EKG shows notes of acute MI.  Patient is bradycardic he states his heart rate always runs low.    Heart score 4.   Patient received 1 nitro but states this pain feels like it is getting worse.  States it feels like it is more in his lungs and does radiate to his back.  With this radiation of his pain to his back as well as the movement of the pain down his chest I do feel a CT of the chest is warranted to rule out dissection.    CT scan has returned and shows a aortic aneurysm but no evidence of dissection.  Patient was advised of his aneurysm and the need for follow-up and monitoring.  Patient continues to have pain without relief with nitro.  Therefore he was given morphine which now has controlled his pain.  He is currently pain-free.  Due to his ongoing pain I do not feel he is a candidate for outpatient follow-up.  He is at moderate risk from his heart score and with his ongoing pain needs monitoring.      FINAL IMPRESSION  1. Acute chest pain     2. Thoracic aortic aneurysm without rupture (HCC)     3. Bradycardia           This dictation was created using voice recognition software. The accuracy of the dictation is limited to the abilities of the  software.  The nursing notes were reviewed and certain aspects of this information were incorporated into this note.      Electronically signed by: Patti Mejia M.D., 7/25/2021 6:11 AM

## 2021-07-25 NOTE — ED NOTES
Med rec updated and complete  Allergies reviewed  Pt reports no antibiotics in the last 30 days.      No current facility-administered medications on file prior to encounter.     Current Outpatient Medications on File Prior to Encounter   Medication Sig Dispense Refill   • sildenafil (REVATIO) 20 MG tablet Take 20 mg by mouth as needed (For erectile dysfunction). For erectile dysfunction     • Ascorbic Acid (VITAMIN C PO) Take 1 tablet by mouth every day.     • Cholecalciferol (D3 PO) Take 1 capsule by mouth every day.     • Cyanocobalamin (B-12 PO) Take 1 tablet by mouth every day.

## 2021-07-26 ENCOUNTER — APPOINTMENT (OUTPATIENT)
Dept: CARDIOLOGY | Facility: MEDICAL CENTER | Age: 71
DRG: 247 | End: 2021-07-26
Attending: HOSPITALIST
Payer: MEDICARE

## 2021-07-26 ENCOUNTER — APPOINTMENT (OUTPATIENT)
Dept: CARDIOLOGY | Facility: MEDICAL CENTER | Age: 71
DRG: 247 | End: 2021-07-26
Attending: STUDENT IN AN ORGANIZED HEALTH CARE EDUCATION/TRAINING PROGRAM
Payer: MEDICARE

## 2021-07-26 LAB
ALBUMIN SERPL BCP-MCNC: 3.7 G/DL (ref 3.2–4.9)
ALBUMIN/GLOB SERPL: 1.5 G/DL
ALP SERPL-CCNC: 71 U/L (ref 30–99)
ALT SERPL-CCNC: 27 U/L (ref 2–50)
ANION GAP SERPL CALC-SCNC: 9 MMOL/L (ref 7–16)
APTT PPP: 67 SEC (ref 24.7–36)
AST SERPL-CCNC: 108 U/L (ref 12–45)
BASOPHILS # BLD AUTO: 0.5 % (ref 0–1.8)
BASOPHILS # BLD: 0.04 K/UL (ref 0–0.12)
BILIRUB SERPL-MCNC: 0.4 MG/DL (ref 0.1–1.5)
BUN SERPL-MCNC: 14 MG/DL (ref 8–22)
CALCIUM SERPL-MCNC: 8.7 MG/DL (ref 8.5–10.5)
CHLORIDE SERPL-SCNC: 106 MMOL/L (ref 96–112)
CHOLEST SERPL-MCNC: 185 MG/DL (ref 100–199)
CO2 SERPL-SCNC: 23 MMOL/L (ref 20–33)
CREAT SERPL-MCNC: 0.94 MG/DL (ref 0.5–1.4)
EKG IMPRESSION: NORMAL
EOSINOPHIL # BLD AUTO: 0.25 K/UL (ref 0–0.51)
EOSINOPHIL NFR BLD: 3.1 % (ref 0–6.9)
ERYTHROCYTE [DISTWIDTH] IN BLOOD BY AUTOMATED COUNT: 43.8 FL (ref 35.9–50)
GLOBULIN SER CALC-MCNC: 2.5 G/DL (ref 1.9–3.5)
GLUCOSE SERPL-MCNC: 97 MG/DL (ref 65–99)
HCT VFR BLD AUTO: 41.6 % (ref 42–52)
HDLC SERPL-MCNC: 75 MG/DL
HGB BLD-MCNC: 14.4 G/DL (ref 14–18)
IMM GRANULOCYTES # BLD AUTO: 0.03 K/UL (ref 0–0.11)
IMM GRANULOCYTES NFR BLD AUTO: 0.4 % (ref 0–0.9)
INR PPP: 1.03 (ref 0.87–1.13)
LDLC SERPL CALC-MCNC: 98 MG/DL
LV EJECT FRACT  99904: 55
LV EJECT FRACT MOD 2C 99903: 46.63
LV EJECT FRACT MOD 4C 99902: 49.94
LV EJECT FRACT MOD BP 99901: 48.01
LYMPHOCYTES # BLD AUTO: 1.64 K/UL (ref 1–4.8)
LYMPHOCYTES NFR BLD: 20.1 % (ref 22–41)
MCH RBC QN AUTO: 33.8 PG (ref 27–33)
MCHC RBC AUTO-ENTMCNC: 34.6 G/DL (ref 33.7–35.3)
MCV RBC AUTO: 97.7 FL (ref 81.4–97.8)
MONOCYTES # BLD AUTO: 0.7 K/UL (ref 0–0.85)
MONOCYTES NFR BLD AUTO: 8.6 % (ref 0–13.4)
NEUTROPHILS # BLD AUTO: 5.48 K/UL (ref 1.82–7.42)
NEUTROPHILS NFR BLD: 67.3 % (ref 44–72)
NRBC # BLD AUTO: 0 K/UL
NRBC BLD-RTO: 0 /100 WBC
PLATELET # BLD AUTO: 143 K/UL (ref 164–446)
PMV BLD AUTO: 9.9 FL (ref 9–12.9)
POTASSIUM SERPL-SCNC: 4.2 MMOL/L (ref 3.6–5.5)
PROT SERPL-MCNC: 6.2 G/DL (ref 6–8.2)
PROTHROMBIN TIME: 13.2 SEC (ref 12–14.6)
RBC # BLD AUTO: 4.26 M/UL (ref 4.7–6.1)
SODIUM SERPL-SCNC: 138 MMOL/L (ref 135–145)
TRIGL SERPL-MCNC: 62 MG/DL (ref 0–149)
TROPONIN T SERPL-MCNC: 1676 NG/L (ref 6–19)
UFH PPP CHRO-ACNC: 0.29 IU/ML
UFH PPP CHRO-ACNC: 0.45 IU/ML
WBC # BLD AUTO: 8.1 K/UL (ref 4.8–10.8)

## 2021-07-26 PROCEDURE — 84484 ASSAY OF TROPONIN QUANT: CPT

## 2021-07-26 PROCEDURE — B2111ZZ FLUOROSCOPY OF MULTIPLE CORONARY ARTERIES USING LOW OSMOLAR CONTRAST: ICD-10-PCS | Performed by: INTERNAL MEDICINE

## 2021-07-26 PROCEDURE — 700105 HCHG RX REV CODE 258: Performed by: INTERNAL MEDICINE

## 2021-07-26 PROCEDURE — 700117 HCHG RX CONTRAST REV CODE 255: Performed by: INTERNAL MEDICINE

## 2021-07-26 PROCEDURE — 99152 MOD SED SAME PHYS/QHP 5/>YRS: CPT | Performed by: INTERNAL MEDICINE

## 2021-07-26 PROCEDURE — 4A023N7 MEASUREMENT OF CARDIAC SAMPLING AND PRESSURE, LEFT HEART, PERCUTANEOUS APPROACH: ICD-10-PCS | Performed by: INTERNAL MEDICINE

## 2021-07-26 PROCEDURE — 99232 SBSQ HOSP IP/OBS MODERATE 35: CPT | Mod: GC | Performed by: INTERNAL MEDICINE

## 2021-07-26 PROCEDURE — 700101 HCHG RX REV CODE 250

## 2021-07-26 PROCEDURE — 85730 THROMBOPLASTIN TIME PARTIAL: CPT

## 2021-07-26 PROCEDURE — 700102 HCHG RX REV CODE 250 W/ 637 OVERRIDE(OP): Performed by: HOSPITALIST

## 2021-07-26 PROCEDURE — A9270 NON-COVERED ITEM OR SERVICE: HCPCS

## 2021-07-26 PROCEDURE — 99153 MOD SED SAME PHYS/QHP EA: CPT

## 2021-07-26 PROCEDURE — 93010 ELECTROCARDIOGRAM REPORT: CPT | Mod: 59 | Performed by: INTERNAL MEDICINE

## 2021-07-26 PROCEDURE — 85025 COMPLETE CBC W/AUTO DIFF WBC: CPT

## 2021-07-26 PROCEDURE — 93306 TTE W/DOPPLER COMPLETE: CPT | Mod: 26 | Performed by: INTERNAL MEDICINE

## 2021-07-26 PROCEDURE — 93458 L HRT ARTERY/VENTRICLE ANGIO: CPT | Mod: 26,59 | Performed by: INTERNAL MEDICINE

## 2021-07-26 PROCEDURE — 80061 LIPID PANEL: CPT

## 2021-07-26 PROCEDURE — 700111 HCHG RX REV CODE 636 W/ 250 OVERRIDE (IP): Performed by: HOSPITALIST

## 2021-07-26 PROCEDURE — 93005 ELECTROCARDIOGRAM TRACING: CPT | Performed by: INTERNAL MEDICINE

## 2021-07-26 PROCEDURE — 700111 HCHG RX REV CODE 636 W/ 250 OVERRIDE (IP)

## 2021-07-26 PROCEDURE — 36415 COLL VENOUS BLD VENIPUNCTURE: CPT

## 2021-07-26 PROCEDURE — 93306 TTE W/DOPPLER COMPLETE: CPT

## 2021-07-26 PROCEDURE — A9270 NON-COVERED ITEM OR SERVICE: HCPCS | Performed by: HOSPITALIST

## 2021-07-26 PROCEDURE — 700105 HCHG RX REV CODE 258: Performed by: STUDENT IN AN ORGANIZED HEALTH CARE EDUCATION/TRAINING PROGRAM

## 2021-07-26 PROCEDURE — B2151ZZ FLUOROSCOPY OF LEFT HEART USING LOW OSMOLAR CONTRAST: ICD-10-PCS | Performed by: INTERNAL MEDICINE

## 2021-07-26 PROCEDURE — 770020 HCHG ROOM/CARE - TELE (206)

## 2021-07-26 PROCEDURE — 027034Z DILATION OF CORONARY ARTERY, ONE ARTERY WITH DRUG-ELUTING INTRALUMINAL DEVICE, PERCUTANEOUS APPROACH: ICD-10-PCS | Performed by: INTERNAL MEDICINE

## 2021-07-26 PROCEDURE — 92928 PRQ TCAT PLMT NTRAC ST 1 LES: CPT | Mod: LC | Performed by: INTERNAL MEDICINE

## 2021-07-26 PROCEDURE — 80053 COMPREHEN METABOLIC PANEL: CPT

## 2021-07-26 PROCEDURE — 85610 PROTHROMBIN TIME: CPT

## 2021-07-26 PROCEDURE — 700111 HCHG RX REV CODE 636 W/ 250 OVERRIDE (IP): Performed by: INTERNAL MEDICINE

## 2021-07-26 PROCEDURE — 700102 HCHG RX REV CODE 250 W/ 637 OVERRIDE(OP)

## 2021-07-26 PROCEDURE — 85520 HEPARIN ASSAY: CPT

## 2021-07-26 RX ORDER — HEPARIN SODIUM 1000 [USP'U]/ML
INJECTION, SOLUTION INTRAVENOUS; SUBCUTANEOUS
Status: COMPLETED
Start: 2021-07-26 | End: 2021-07-26

## 2021-07-26 RX ORDER — MIDAZOLAM HYDROCHLORIDE 1 MG/ML
INJECTION INTRAMUSCULAR; INTRAVENOUS
Status: COMPLETED
Start: 2021-07-26 | End: 2021-07-26

## 2021-07-26 RX ORDER — PRASUGREL 10 MG/1
10 TABLET, FILM COATED ORAL DAILY
Status: DISCONTINUED | OUTPATIENT
Start: 2021-07-27 | End: 2021-07-27 | Stop reason: HOSPADM

## 2021-07-26 RX ORDER — PRASUGREL 10 MG/1
60 TABLET, FILM COATED ORAL ONCE
Status: DISCONTINUED | OUTPATIENT
Start: 2021-07-26 | End: 2021-07-26

## 2021-07-26 RX ORDER — VERAPAMIL HYDROCHLORIDE 2.5 MG/ML
INJECTION, SOLUTION INTRAVENOUS
Status: COMPLETED
Start: 2021-07-26 | End: 2021-07-26

## 2021-07-26 RX ORDER — PRASUGREL 10 MG/1
TABLET, FILM COATED ORAL
Status: COMPLETED
Start: 2021-07-26 | End: 2021-07-26

## 2021-07-26 RX ORDER — LIDOCAINE HYDROCHLORIDE 20 MG/ML
INJECTION, SOLUTION INFILTRATION; PERINEURAL
Status: COMPLETED
Start: 2021-07-26 | End: 2021-07-26

## 2021-07-26 RX ORDER — ASPIRIN 81 MG/1
81 TABLET, CHEWABLE ORAL DAILY
Status: DISCONTINUED | OUTPATIENT
Start: 2021-07-26 | End: 2021-07-26

## 2021-07-26 RX ORDER — HEPARIN SODIUM 200 [USP'U]/100ML
INJECTION, SOLUTION INTRAVENOUS
Status: COMPLETED
Start: 2021-07-26 | End: 2021-07-26

## 2021-07-26 RX ORDER — BIVALIRUDIN 250 MG/5ML
INJECTION, POWDER, LYOPHILIZED, FOR SOLUTION INTRAVENOUS
Status: COMPLETED
Start: 2021-07-26 | End: 2021-07-26

## 2021-07-26 RX ORDER — SODIUM CHLORIDE 9 MG/ML
INJECTION, SOLUTION INTRAVENOUS CONTINUOUS
Status: DISCONTINUED | OUTPATIENT
Start: 2021-07-26 | End: 2021-07-27 | Stop reason: HOSPADM

## 2021-07-26 RX ORDER — SODIUM CHLORIDE 9 MG/ML
INJECTION, SOLUTION INTRAVENOUS CONTINUOUS
Status: DISCONTINUED | OUTPATIENT
Start: 2021-07-26 | End: 2021-07-26

## 2021-07-26 RX ORDER — ASPIRIN 325 MG
TABLET ORAL
Status: ACTIVE
Start: 2021-07-26 | End: 2021-07-26

## 2021-07-26 RX ADMIN — PRASUGREL 60 MG: 10 TABLET, FILM COATED ORAL at 14:40

## 2021-07-26 RX ADMIN — FENTANYL CITRATE 75 MCG: 50 INJECTION, SOLUTION INTRAMUSCULAR; INTRAVENOUS at 14:34

## 2021-07-26 RX ADMIN — HEPARIN SODIUM: 1000 INJECTION, SOLUTION INTRAVENOUS; SUBCUTANEOUS at 14:15

## 2021-07-26 RX ADMIN — HEPARIN SODIUM 2000 UNITS: 1000 INJECTION, SOLUTION INTRAVENOUS; SUBCUTANEOUS at 04:02

## 2021-07-26 RX ADMIN — NITROGLYCERIN 10 ML: 20 INJECTION INTRAVENOUS at 14:16

## 2021-07-26 RX ADMIN — LIDOCAINE HYDROCHLORIDE: 20 INJECTION, SOLUTION INFILTRATION; PERINEURAL at 14:13

## 2021-07-26 RX ADMIN — BIVALIRUDIN 250 MG: 250 INJECTION, POWDER, LYOPHILIZED, FOR SOLUTION INTRAVENOUS at 14:27

## 2021-07-26 RX ADMIN — IOHEXOL 69 ML: 350 INJECTION, SOLUTION INTRAVENOUS at 14:34

## 2021-07-26 RX ADMIN — ASPIRIN 325 MG ORAL TABLET 325 MG: 325 PILL ORAL at 04:22

## 2021-07-26 RX ADMIN — BIVALIRUDIN 0.2 MG/KG/HR: 250 INJECTION, POWDER, LYOPHILIZED, FOR SOLUTION INTRAVENOUS at 15:30

## 2021-07-26 RX ADMIN — ATORVASTATIN CALCIUM 80 MG: 80 TABLET, FILM COATED ORAL at 16:09

## 2021-07-26 RX ADMIN — HEPARIN SODIUM 2000 UNITS: 200 INJECTION, SOLUTION INTRAVENOUS at 14:14

## 2021-07-26 RX ADMIN — SODIUM CHLORIDE: 9 INJECTION, SOLUTION INTRAVENOUS at 03:30

## 2021-07-26 RX ADMIN — SODIUM CHLORIDE: 9 INJECTION, SOLUTION INTRAVENOUS at 16:10

## 2021-07-26 RX ADMIN — SODIUM CHLORIDE: 9 INJECTION, SOLUTION INTRAVENOUS at 20:56

## 2021-07-26 RX ADMIN — MIDAZOLAM HYDROCHLORIDE 1.5 MG: 1 INJECTION, SOLUTION INTRAMUSCULAR; INTRAVENOUS at 14:34

## 2021-07-26 RX ADMIN — ACETAMINOPHEN 650 MG: 325 TABLET, FILM COATED ORAL at 15:16

## 2021-07-26 RX ADMIN — VERAPAMIL HYDROCHLORIDE 2.5 MG: 2.5 INJECTION, SOLUTION INTRAVENOUS at 13:45

## 2021-07-26 ASSESSMENT — ENCOUNTER SYMPTOMS
DIZZINESS: 0
DOUBLE VISION: 0
NUMBNESS: 0
DIAPHORESIS: 0
BLURRED VISION: 0
NERVOUS/ANXIOUS: 0
PALPITATIONS: 0
COLOR CHANGE: 0
CHEST TIGHTNESS: 0
CONFUSION: 0
TROUBLE SWALLOWING: 0
SHORTNESS OF BREATH: 0
COUGH: 0
VOMITING: 0
FEVER: 0
AGITATION: 0
ABDOMINAL PAIN: 0
WEAKNESS: 0
CHILLS: 0
ORTHOPNEA: 0
NAUSEA: 0
MYALGIAS: 0
HEADACHES: 0
ABDOMINAL DISTENTION: 0
BLOOD IN STOOL: 0
TINGLING: 0

## 2021-07-26 ASSESSMENT — PAIN DESCRIPTION - PAIN TYPE: TYPE: ACUTE PAIN

## 2021-07-26 NOTE — PROGRESS NOTES
4 Eyes Skin Assessment Completed.    Head WDL  Ears WDL  Nose WDL  Mouth WDL  Neck WDL  Breast/Chest WDL  Shoulder Blades WDL  Spine WDL  (R) Arm/Elbow/Hand WDL  (L) Arm/Elbow/Hand WDL  Abdomen WDL  Groin WDL  Scrotum/Coccyx/Buttocks WDL  (R) Leg WDL  (L) Leg WDL  (R) Heel/Foot/Toe WDL  (L) Heel/Foot/Toe WDL          Devices In Places Tele Box      Interventions In Place N/A    Possible Skin Injury No    Pictures Uploaded Into Epic No, needs to be completed  Wound Consult Placed N/A  RN Wound Prevention Protocol Ordered No

## 2021-07-26 NOTE — DISCHARGE SUMMARY
"Discharge Summary    CHIEF COMPLAINT ON ADMISSION  Chief Complaint   Patient presents with   • Chest Pain     \"dull\", intermittent since yesterday, awoke him from sleep this morning.       Reason for Admission  Chest pain     CODE STATUS  Full Code    HPI & HOSPITAL COURSE  Per my H&P HPI:    \"Vasile Bowers is a 70 y.o. male who presented 7/25/2021 with chest pain, he reports chest pain, then migratory pain to his shoulders and neck, occasionally radiating to his back.  Has never felt like this before, no previous MI. Overall feels he is a healthy person, recently moved and was lifting some heavy objects but he reports that was weeks ago. Pain work him from his sleep and he cannot get comfortable at home and here is the same. He is sitting up and trying to stretch but it's still achy dull and uncomfortable. It is not worse with exertion.  He has not tried any medications to alleviate his symptoms.  Has not had any leg swelling or calf pain.  No fevers or cough.  No vomiting or diarrhea.  No abdominal pain.  No back pain.     Due to back pain, CTA obtained, No PE, small aneurysm,  Mild aneurysmal dilatation of the ascending aorta measured at 4.2 x 4.1 cm.     Smoked >40 years ago and infrequent. No FHx.\"    Patient was slightly uncomfortable during his stay here, just couldn't get comfortable in bed. Trops negative x 2 and then third trop elevated 240. D/w Dr Narayanan of Cardiology, recommending tx for NSTEMI, transfer to [facility]. Can have ECHO performed at [facility].     Therefore, he is discharged in fair and stable condition to a short-term general hosptial for inpatient care.    FOLLOW UP ITEMS POST DISCHARGE  As per management team at [facility]  Needs ECHO which has been ordered    DISCHARGE DIAGNOSES  Principal Problem:    Other chest pain POA: Unknown  Active Problems:    Thrombocytopenia (HCC) POA: Unknown  Resolved Problems:    * No resolved hospital problems. *      FOLLOW UP  No future " appointments.  No follow-up provider specified.    MEDICATIONS ON DISCHARGE     Medication List      ASK your doctor about these medications      Instructions   B-12 PO   Take 1 tablet by mouth every day.  Dose: 1 tablet     D3 PO   Take 1 capsule by mouth every day.  Dose: 1 capsule     sildenafil 20 MG tablet  Commonly known as: REVATIO   Take 20 mg by mouth as needed (For erectile dysfunction). For erectile dysfunction  Dose: 20 mg     VITAMIN C PO   Take 1 tablet by mouth every day.  Dose: 1 tablet            Allergies  No Known Allergies    DIET  Orders Placed This Encounter   Procedures   • Diet Order Diet: Cardiac; Miscellaneous modifications: (optional): No Decaf, No Caffeine(for test)     Standing Status:   Standing     Number of Occurrences:   1     Order Specific Question:   Diet:     Answer:   Cardiac [6]     Order Specific Question:   Miscellaneous modifications: (optional)     Answer:   No Decaf, No Caffeine(for test) [11]       ACTIVITY  As tolerated.  Weight bearing as tolerated    LINES, DRAINS, AND WOUNDS  This is an automated list. Peripheral IVs will be removed prior to discharge.  Peripheral IV 07/25/21 18 G Right Forearm (Active)   Site Assessment Clean;Dry;Intact 07/25/21 0900   Dressing Type Transparent 07/25/21 0900   Line Status Saline locked;Scrubbed the hub prior to access;Flushed 07/25/21 0900   Dressing Status Clean;Dry;Intact 07/25/21 0900   Dressing Intervention N/A 07/25/21 0900   Infiltration Grading ([facility]) 0 07/25/21 0900   Phlebitis Scale ([facility]) 0 07/25/21 0900          Peripheral IV 07/25/21 18 G Right Forearm (Active)   Site Assessment Clean;Dry;Intact 07/25/21 0900   Dressing Type Transparent 07/25/21 0900   Line Status Saline locked;Scrubbed the hub prior to access;Flushed 07/25/21 0900   Dressing Status Clean;Dry;Intact 07/25/21 0900   Dressing Intervention N/A 07/25/21 0900   Infiltration Grading ([facility]) 0 07/25/21 0900   Phlebitis Scale ([facility]) 0  07/25/21 0900               MENTAL STATUS ON TRANSFER  WNL      CONSULTATIONS  Cardiology    PROCEDURES  None    LABORATORY  Lab Results   Component Value Date    SODIUM 139 07/25/2021    POTASSIUM 4.3 07/25/2021    CHLORIDE 105 07/25/2021    CO2 22 07/25/2021    GLUCOSE 94 07/25/2021    BUN 14 07/25/2021    CREATININE 1.02 07/25/2021        Lab Results   Component Value Date    WBC 4.6 (L) 07/25/2021    HEMOGLOBIN 15.2 07/25/2021    HEMATOCRIT 43.7 07/25/2021    PLATELETCT 150 (L) 07/25/2021        Total time of the discharge process exceeds 36 minutes.

## 2021-07-26 NOTE — CONSULTS
Reason for Consult:  Asked by Dr Markos Chavarria* to see this patient with chest pain and NSTEMI  Patient's PCP: Diamond Clayton M.D.    CC: Chest pain    HPI: Vasile Bowers is a 70-year-old male with no prior cardiac history who presented with chest pain, admitted at [facility] for ACS work-up.  Initial troponin was negative however repeat troponin up trended from 26 to 260.  The patient was transferred to [facility] for NSTEMI and further management.     The patient reports that he started having chest pain yesterday when he was walking and thought it was related to smoke.  This morning, he woke up with chest pain.  As such, he came to the ER.    The patient denies any prior medical or cardiac history.  He reports that he is pretty active, and denies any prior similar symptoms.    He denies any current chest pain.  Denies any orthopnea, PND, or leg swelling.  No palpitations.  No syncope or presyncopal episodes.    Medications / Drug list prior to admission:  No current facility-administered medications on file prior to encounter.     Current Outpatient Medications on File Prior to Encounter   Medication Sig Dispense Refill   • sildenafil (REVATIO) 20 MG tablet Take 20 mg by mouth as needed (For erectile dysfunction). For erectile dysfunction     • Ascorbic Acid (VITAMIN C PO) Take 1 tablet by mouth every day.     • Cholecalciferol (D3 PO) Take 1 capsule by mouth every day.     • Cyanocobalamin (B-12 PO) Take 1 tablet by mouth every day.         Current list of administered Medications:    Current Facility-Administered Medications:   •  enalaprilat (VASOTEC) injection 1.25 mg, 1.25 mg, Intravenous, Q6HRS PRN, Wilfredo Terrell M.D.  •  labetalol (NORMODYNE/TRANDATE) injection 10 mg, 10 mg, Intravenous, Q4HRS PRN, Wilfredo Terrell M.D.  •  ondansetron (ZOFRAN ODT) dispertab 4 mg, 4 mg, Oral, Q4HRS PRN, Wilfredo Terrell M.D.  •  ondansetron (ZOFRAN) syringe/vial injection 4 mg, 4 mg, Intravenous,  Q4HRS PRN, Wilfredo Terrell M.D.  •  [START ON 7/26/2021] senna-docusate (PERICOLACE or SENOKOT S) 8.6-50 MG per tablet 2 tablet, 2 tablet, Oral, BID **AND** polyethylene glycol/lytes (MIRALAX) PACKET 1 Packet, 1 Packet, Oral, QDAY PRN **AND** magnesium hydroxide (MILK OF MAGNESIA) suspension 30 mL, 30 mL, Oral, QDAY PRN **AND** bisacodyl (DULCOLAX) suppository 10 mg, 10 mg, Rectal, QDAY PRN, Wilfredo Terrell M.D.  •  acetaminophen (Tylenol) tablet 650 mg, 650 mg, Oral, Q6HRS PRN, Wilfredo Terrell M.D.  •  atorvastatin (LIPITOR) tablet 80 mg, 80 mg, Oral, Q EVENING, Wilfredo Terrell M.D.  •  [START ON 7/26/2021] carvedilol (COREG) tablet 6.25 mg, 6.25 mg, Oral, BID WITH MEALS, Wilfredo Terrell M.D.  •  heparin infusion 25,000 units in 500 mL 0.45% NACL, 0-30 Units/kg/hr, Intravenous, Continuous, Wilfredo Terrell M.D.  •  heparin injection 2,000 Units, 2,000 Units, Intravenous, PRN, Wilfredo Terrell M.D.  •  heparin injection 4,000 Units, 4,000 Units, Intravenous, Once, Wilfredo Terrell M.D.  •  morphine (pf) 4 mg/mL injection 2-4 mg, 2-4 mg, Intravenous, Q5 MIN PRN, Wilfredo Terrell M.D.  •  nitroglycerin (NITROSTAT) tablet 0.4 mg, 0.4 mg, Sublingual, Q5 MIN PRN, Wilfredo Terrell M.D.  •  [START ON 7/26/2021] aspirin (ASA) tablet 325 mg, 325 mg, Oral, DAILY **OR** [START ON 7/26/2021] aspirin (ASA) chewable tab 324 mg, 324 mg, Oral, DAILY **OR** [START ON 7/26/2021] aspirin (ASA) suppository 300 mg, 300 mg, Rectal, DAILY, Wilfredo Terrell M.D.  •  oxyCODONE immediate release (ROXICODONE) tablet 10 mg, 10 mg, Oral, Q4HRS PRN, Wilfredo Terrell M.D.  •  oxyCODONE immediate-release (ROXICODONE) tablet 5 mg, 5 mg, Oral, Q4HRS PRN, Wilfredo Terrell M.D.    No past medical history on file.    Past Surgical History:   Procedure Laterality Date   • KNEE ARTHROSCOPY     • SHOULDER ARTHROSCOPY         Family History   Problem Relation Age of Onset   • Heart Disease Mother    • Heart Failure Mother      Patient family history  "was personally reviewed, no pertinent family history to current presentation    Social History     Tobacco Use   • Smoking status: Former Smoker   • Smokeless tobacco: Never Used   Substance Use Topics   • Alcohol use: Yes   • Drug use: No       ALLERGIES:  No Known Allergies    Review of systems:  A complete review of symptoms was reviewed with patient. This is reviewed in H&P and PMH. ALL OTHERS reviewed and negative    Physical exam:  Patient Vitals for the past 24 hrs:   BP Temp Temp src Pulse Resp SpO2 Height Weight   21 2132 119/67 36 °C (96.8 °F) Temporal (!) 48 16 95 % 1.753 m (5' 9\") 74.5 kg (164 lb 3.9 oz)     General: No acute distress.   EYES: no jaundice  HEENT: OP clear   Neck: No bruits No JVD.   CVS: Bradycardic rate, regular rhythm. S1 + S2. No M/R/G  Resp: CTAB. No wheezing or crackles/rhonchi.  Abdomen: Soft, NT, ND,  Skin: Grossly nothing acute no obvious rashes  Neurological: Alert, Moves all extremities, no cranial nerve defects on limited exam  Extremities: Pulse 2+ in b/l LE.  No edema. No cyanosis.       Data:  Laboratory studies personally reviewed by me:  Recent Results (from the past 24 hour(s))   EKG    Collection Time: 21  5:59 AM   Result Value Ref Range    Report       [facility] Emergency Dept.    Test Date:  2021  Pt Name:    EDY ARORA               Department: [facility]  MRN:        [id number]                      Room:  Gender:     Male                         Technician: BELLA  :        1950                   Requested By:ER TRIAGE PROTOCOL  Order #:    [id number]                    Reading MD: Patti Hussein    Measurements  Intervals                                Axis  Rate:       43                           P:          14  GA:         187                          QRS:        -38  QRSD:       117                          T:          13  QT:         481  QTc:        407    Interpretive Statements  Sinus bradycardia  Nonspecific " IVCD with LAD  Low voltage, precordial leads  No previous ECG available for comparison  Electronically Signed On 7- 6:46:39 PDT by Patti Hussein     CBC w/ Differential    Collection Time: 07/25/21  6:11 AM   Result Value Ref Range    WBC 4.6 (L) 4.8 - 10.8 K/uL    RBC 4.50 (L) 4.70 - 6.10 M/uL    Hemoglobin 15.2 14.0 - 18.0 g/dL    Hematocrit 43.7 42.0 - 52.0 %    MCV 97.1 81.4 - 97.8 fL    MCH 33.8 (H) 27.0 - 33.0 pg    MCHC 34.8 33.7 - 35.3 g/dL    RDW 43.1 35.9 - 50.0 fL    Platelet Count 150 (L) 164 - 446 K/uL    MPV 9.8 9.0 - 12.9 fL    Neutrophils-Polys 53.30 44.00 - 72.00 %    Lymphocytes 28.30 22.00 - 41.00 %    Monocytes 11.60 0.00 - 13.40 %    Eosinophils 5.50 0.00 - 6.90 %    Basophils 1.10 0.00 - 1.80 %    Immature Granulocytes 0.20 0.00 - 0.90 %    Nucleated RBC 0.00 /100 WBC    Neutrophils (Absolute) 2.43 1.82 - 7.42 K/uL    Lymphs (Absolute) 1.29 1.00 - 4.80 K/uL    Monos (Absolute) 0.53 0.00 - 0.85 K/uL    Eos (Absolute) 0.25 0.00 - 0.51 K/uL    Baso (Absolute) 0.05 0.00 - 0.12 K/uL    Immature Granulocytes (abs) 0.01 0.00 - 0.11 K/uL    NRBC (Absolute) 0.00 K/uL   Complete Metabolic Panel (CMP)    Collection Time: 07/25/21  6:11 AM   Result Value Ref Range    Sodium 139 135 - 145 mmol/L    Potassium 4.3 3.6 - 5.5 mmol/L    Chloride 105 96 - 112 mmol/L    Co2 22 20 - 33 mmol/L    Anion Gap 12.0 7.0 - 16.0    Glucose 94 65 - 99 mg/dL    Bun 14 8 - 22 mg/dL    Creatinine 1.02 0.50 - 1.40 mg/dL    Calcium 8.8 8.4 - 10.2 mg/dL    AST(SGOT) 20 12 - 45 U/L    ALT(SGPT) 15 2 - 50 U/L    Alkaline Phosphatase 76 30 - 99 U/L    Total Bilirubin 0.7 0.1 - 1.5 mg/dL    Albumin 4.2 3.2 - 4.9 g/dL    Total Protein 6.9 6.0 - 8.2 g/dL    Globulin 2.7 1.9 - 3.5 g/dL    A-G Ratio 1.6 g/dL   Troponin STAT    Collection Time: 07/25/21  6:11 AM   Result Value Ref Range    Troponin T 14 6 - 19 ng/L   Lipase    Collection Time: 07/25/21  6:11 AM   Result Value Ref Range    Lipase 44 7 - 58 U/L   ESTIMATED GFR     Collection Time: 07/25/21  6:11 AM   Result Value Ref Range    GFR If African American >60 >60 mL/min/1.73 m 2    GFR If Non African American >60 >60 mL/min/1.73 m 2   Troponin in four (4) hours    Collection Time: 07/25/21 10:53 AM   Result Value Ref Range    Troponin T 26 (H) 6 - 19 ng/L   TROPONIN    Collection Time: 07/25/21  5:01 PM   Result Value Ref Range    Troponin T 260 (H) 6 - 19 ng/L   aPTT    Collection Time: 07/25/21  6:03 PM   Result Value Ref Range    APTT 27.3 24.7 - 36.0 sec   Prothrombin Time    Collection Time: 07/25/21  6:03 PM   Result Value Ref Range    PT 12.8 12.0 - 14.6 sec    INR 1.04 0.87 - 1.13   Heparin Xa (Unfractionated)    Collection Time: 07/25/21  6:03 PM   Result Value Ref Range    Heparin Xa (UFH) <0.10 IU/mL       Imaging:  EC-ECHOCARDIOGRAM COMPLETE W/O CONT    (Results Pending)           EKG 7/25/2021: personally reviewed by me and sinus bradycardia, left anterior fascicular block     Echocardiogram pending    All pertinent features of laboratory and imaging reviewed including primary images where applicable      Principal Problem:    NSTEMI (non-ST elevated myocardial infarction) (HCC) POA: Unknown  Resolved Problems:    * No resolved hospital problems. *      Assessment / Plan:     NSTEMI  Chest pain  -Continue heparin drip  -Aspirin and high intensity statin  -Agree with carvedilol  -Start ACE inhibitor after cath  -Obtain echocardiogram to assess LVEF and any structural abnormalities  -N.p.o. after midnight for coronary angiogram tomorrow    I personally discussed his case with  Dr Markos Chavarria*    It is my pleasure to participate in the care of Mr. Bowers.  Please do not hesitate to contact me with questions or concerns.    Xochitl Narayanan MD   Cardiologist [facility]    7/25/2021    Please note that this dictation was created using voice recognition software. There may be errors I did not discover before finalizing the note.

## 2021-07-26 NOTE — PROGRESS NOTES
fahad from Lab called with lab result of troponin 260 at 1745.  lab result read back to Fahad.   Dr. Terrell notified of critical lab result at 1745.  Critical lab result read back by Dr. Terrell.

## 2021-07-26 NOTE — ASSESSMENT & PLAN NOTE
Initially admitted at [facility] for chest pain, first two troponins were negative with third elevated @ 240  EKG non ischemic  Patient was uncomfortable in bed with chest, shoulder and jaw pain  CTA negative for PE, small AAA noted  D/W Dr Narayanan, patient to transfer for further workup, ECHO pending    Continue ASA, BB, statin  Morphine PRN  Labs in the AM  Continue tele

## 2021-07-26 NOTE — PROGRESS NOTES
Bedside report received. Patient chart and MAR reviewed. Tele monitor on patient. No complaints of pain at this time. Fall precautions in place. Patient in bed. Patient alert and oriented x4. Call light within reach. Bed in low and locked position. Patient updated on POC, all questions answered/concerns addressed. Patient denies any additional needs at this time. No further questions at this time.

## 2021-07-26 NOTE — CARE PLAN
Problem: Knowledge Deficit - Standard  Goal: Patient and family/care givers will demonstrate understanding of plan of care, disease process/condition, diagnostic tests and medications  Outcome: Progressing     Problem: Pain - Standard  Goal: Alleviation of pain or a reduction in pain to the patient’s comfort goal  Outcome: Progressing     The patient is Watcher - Medium risk of patient condition declining or worsening    Shift Goals  Clinical Goals: Cath lab  Patient Goals: Rest    Progress made toward(s) clinical / shift goals:  Patient educated to notify this RN if chest pain begins again. Patient educated on reasoning for heparin gtt. Patient verbalized understanding .

## 2021-07-26 NOTE — PROGRESS NOTES
Cardiology Follow Up Progress Note    Date of Service  7/26/2021    Attending Physician  Wilfred Bahena M.D.    Chief Complaint   Chest pain     HPI  Vasile Bowers is a 70 y.o. male admitted 7/25/2021 with chest pain with pain radiating to his shoulders and neck. Transferred from [facility] for NSTEMI. Trop T peaked at 1676.     History of tobacco abuse.    Interim Events  Patient is doing well. Denies any chest pain today.   Trop t peaked at 1676  LDL 98  SB 43-59 overnight on the monitor     Review of Systems  Review of Systems   Constitutional: Negative for chills, diaphoresis and fever.   HENT: Negative for nosebleeds and trouble swallowing.    Respiratory: Negative for cough, chest tightness and shortness of breath.    Cardiovascular: Negative for chest pain, palpitations and leg swelling.   Gastrointestinal: Negative for abdominal distention, abdominal pain and blood in stool.   Genitourinary: Negative for hematuria.   Skin: Negative for color change.   Neurological: Negative for dizziness, syncope and numbness.   Psychiatric/Behavioral: Negative for agitation and confusion. The patient is not nervous/anxious.        Vital signs in last 24 hours  Temp:  [36 °C (96.8 °F)-36.9 °C (98.4 °F)] 36.6 °C (97.8 °F)  Pulse:  [46-62] 62  Resp:  [16] 16  BP: (105-119)/(66-70) 105/66  SpO2:  [93 %-97 %] 97 %    Physical Exam  Physical Exam  Vitals and nursing note reviewed.   Constitutional:       Appearance: Normal appearance.   HENT:      Head: Normocephalic and atraumatic.   Eyes:      Pupils: Pupils are equal, round, and reactive to light.   Cardiovascular:      Rate and Rhythm: Normal rate and regular rhythm.      Heart sounds: Normal heart sounds. No murmur heard.     Pulmonary:      Effort: Pulmonary effort is normal.      Breath sounds: Normal breath sounds.   Abdominal:      General: Abdomen is flat.   Musculoskeletal:      Cervical back: Normal range of motion.   Skin:     General: Skin is warm  and dry.   Neurological:      General: No focal deficit present.      Mental Status: He is alert and oriented to person, place, and time.   Psychiatric:         Mood and Affect: Mood normal.         Behavior: Behavior normal.         Thought Content: Thought content normal.         Judgment: Judgment normal.         Lab Review  Lab Results   Component Value Date/Time    WBC 8.1 07/26/2021 02:59 AM    RBC 4.26 (L) 07/26/2021 02:59 AM    HEMOGLOBIN 14.4 07/26/2021 02:59 AM    HEMATOCRIT 41.6 (L) 07/26/2021 02:59 AM    MCV 97.7 07/26/2021 02:59 AM    MCH 33.8 (H) 07/26/2021 02:59 AM    MCHC 34.6 07/26/2021 02:59 AM    MPV 9.9 07/26/2021 02:59 AM      Lab Results   Component Value Date/Time    SODIUM 138 07/26/2021 02:59 AM    POTASSIUM 4.2 07/26/2021 02:59 AM    CHLORIDE 106 07/26/2021 02:59 AM    CO2 23 07/26/2021 02:59 AM    GLUCOSE 97 07/26/2021 02:59 AM    BUN 14 07/26/2021 02:59 AM    CREATININE 0.94 07/26/2021 02:59 AM      Lab Results   Component Value Date/Time    ASTSGOT 108 (H) 07/26/2021 02:59 AM    ALTSGPT 27 07/26/2021 02:59 AM     Lab Results   Component Value Date/Time    CHOLSTRLTOT 185 07/26/2021 02:59 AM    LDL 98 07/26/2021 02:59 AM    HDL 75 07/26/2021 02:59 AM    TRIGLYCERIDE 62 07/26/2021 02:59 AM    TROPONINT 1676 (H) 07/26/2021 02:59 AM       No results for input(s): NTPROBNP in the last 72 hours.    Cardiac Imaging and Procedures Review  EKG:  SB 40-60    Echocardiogram:  Pending     Cardiac Catheterization: pending     Imaging  CTA  7/25/2021  1.  Mild aneurysmal dilatation of the ascending aorta measured at 4.2 x 4.1 cm.     2.  No evidence of aortic dissection.     3.  Fatty change of the liver.      Assessment/Plan  No new Assessment & Plan notes have been filed under this hospital service since the last note was generated.  Service: Cardiology    1. NSTEMI:  - trop T peaked at   - continue heparin gtt   - plan for coronary angiogram   - continue asa 81mg qd, coreg 6.25mg BID, and  atorvastatin 80mg qd   - ECHO ordered and pending       Thank you for allowing me to participate in the care of this patient.  I will continue to follow this patient    Please contact me with any questions.    JASPREET Arenas.

## 2021-07-26 NOTE — PROGRESS NOTES
Received bedside patient report from ALEXSANDRA Rojas. Patient resting comfortably in bed, no complaints at this time. Safety precautions in place. Will continue to monitor.

## 2021-07-26 NOTE — ASSESSMENT & PLAN NOTE
Patient admitted yesterday with troponins 1676 on last check. Coronary angiogram performed this AM:  EF 65%; Successful PCI culprit OM1, excellent result.  No complications.  -On telemetry  -Cardiology on board; continue ASA, BB, statin, morphine PRN for pain  -Dual antiplatelet therapy for 1 year    -Ambulate 2-3 hours post procedure as tolerated  -Cath site is clean

## 2021-07-26 NOTE — PROGRESS NOTES
Received patient Vasile Bowers as transfer from [facility]. In summary, Robinson is a 70-year-old male without significant PMH. [facility] he presented with chest pain and was noted to have increasing troponin levels. Currently on aspirin, statin, carvedilol, and heparin drip.    -I have physically evaluated patient at bedside, and have discussed case with cardiology at bedside. Agree with continuing current management.  -In addition, patient will be n.p.o. after midnight for cardiac cath in the morning.      For full HPI, see H&P by Dr. Wilfredo Terrell.

## 2021-07-26 NOTE — PROGRESS NOTES
Med rec complete per med rec tech at [facility] on 7/25/21 prior to transfer to [facility]    ED Note by tech at [facility]:  Med rec updated and complete  Allergies reviewed  Pt reports no antibiotics in the last 30 days.        No current facility-administered medications on file prior to encounter.             Current Outpatient Medications on File Prior to Encounter   Medication Sig Dispense Refill   • sildenafil (REVATIO) 20 MG tablet Take 20 mg by mouth as needed (For erectile dysfunction). For erectile dysfunction       • Ascorbic Acid (VITAMIN C PO) Take 1 tablet by mouth every day.       • Cholecalciferol (D3 PO) Take 1 capsule by mouth every day.       • Cyanocobalamin (B-12 PO) Take 1 tablet by mouth every day.

## 2021-07-26 NOTE — DISCHARGE PLANNING
Anticipated Discharge Disposition: Transfer  Action: Dr Terrell sending to [facility] Tele [id number] Dr Eron Padilla accepting. PCS sent and on floor. COBRA completed. ETA 1930.    Barriers to Discharge: Transport 1930    Plan: Cont to [facility]

## 2021-07-26 NOTE — PROGRESS NOTES
Daily Progress Note:     Date of Service: 7/26/2021  Primary Team: [employer] IM Orange Team   Attending: Wilfred Bahena M.D.   Senior Resident: Dr. Sultan Callejas  Intern: Dr. Beverley Lyn  Contact:  [phone]    Chief Complaint:   Chest pain since yesterday    Subjective:  Patient remarks no events overnight.  Feels better. No chest pain, diaphoresis, palpitations, SOB, fever, chills or edema.    Consultants/Specialty:  Cardiology    Review of Systems:   Review of Systems   Constitutional: Negative for chills, diaphoresis, fever and malaise/fatigue.   Eyes: Negative for blurred vision and double vision.   Respiratory: Negative for cough and shortness of breath.    Cardiovascular: Negative for chest pain, palpitations, orthopnea and leg swelling.   Gastrointestinal: Negative for nausea and vomiting.   Musculoskeletal: Negative for myalgias.   Neurological: Negative for dizziness, tingling, weakness and headaches.       Objective Data:   Vitals:   Temp:  [36 °C (96.8 °F)-37.1 °C (98.7 °F)] 37.1 °C (98.7 °F)  Pulse:  [46-95] 95  Resp:  [16-17] 17  BP: (105-119)/(66-70) 110/67  SpO2:  [93 %-97 %] 94 %  Physical Exam:   Physical Exam  Constitutional:       General: He is not in acute distress.     Appearance: Normal appearance.   HENT:      Head: Normocephalic and atraumatic.      Mouth/Throat:      Pharynx: Oropharynx is clear.   Eyes:      Extraocular Movements: Extraocular movements intact.      Pupils: Pupils are equal, round, and reactive to light.   Neck:      Vascular: No carotid bruit.   Cardiovascular:      Rate and Rhythm: Regular rhythm. Bradycardia present.      Pulses: Normal pulses.      Heart sounds: Normal heart sounds.   Pulmonary:      Effort: Pulmonary effort is normal. No respiratory distress.      Breath sounds: Normal breath sounds.   Abdominal:      General: Bowel sounds are normal. There is no distension.      Palpations: Abdomen is soft.      Tenderness: There is no abdominal tenderness.    Musculoskeletal:         General: No swelling.      Right lower leg: No edema.      Left lower leg: No edema.   Skin:     General: Skin is warm and dry.   Neurological:      General: No focal deficit present.      Mental Status: He is alert and oriented to person, place, and time. Mental status is at baseline.         Labs:   See results tab    Imaging:   EC-ECHOCARDIOGRAM COMPLETE W/O CONT    (Results Pending)   CL-LEFT HEART CATHETERIZATION WITH POSSIBLE INTERVENTION    (Results Pending)      69 y/o M with PMH of lumbar stenosis, patellar tendinitis is admitted yesterday for chest pain that awoke him yesterday. No history of prior MI.  Coronary angiogram performed in p.m.: EF 65%; successful PCI; no complications.       * NSTEMI (non-ST elevated myocardial infarction) ([facility])  Assessment & Plan  Patient admitted yesterday with troponins 1676 on last check. Coronary angiogram performed this AM:  EF 65%; Successful PCI culprit OM1, excellent result.  No complications.    -On telemetry  -Cardiology on board; continue ASA, BB, statin, morphine PRN for pain  -Dual antiplatelet therapy for 1 year    -Ambulate 2-3 hours post procedure as tolerated  -Cath site is clean

## 2021-07-26 NOTE — PROCEDURES
Cardiac Catheterization report    7/26/2021  2:39 PM    REFERRING MD: Dr. Narayanan    INDICATION/PREOPERATIVE DIAGNOSIS:  1.  ACS <24 hours    POSTOPERATIVE DIAGNOSIS:  1. 98% focal culprit OM 1 stenosis  2. Single-vessel coronary disease  3. LVEF 65%, LVEDP 10 mmHg  4. Successful PCI culprit OM1 (2.25 x 15 mm Adam FERNANDA), excellent result    RECOMMENDATIONS:  Guideline directed medical therapy   Cardiovascular Risk factor modification  Dual antiplatelet therapy for 1 year      PROCEDURES PERFORMED:  · Coronary arteriograms  · Left heart catheterization and Left ventriculogram   · Supervision moderate sedation  · Percutaneous coronary intervention      FINDINGS:  I.  HEMODYNAMICS   Ao: 82/59 mmHg   LEDP: 10 mmHg   Gradient on LV pullback: No    II. CORONARY ANGIOGRAPHY:  Left main coronary artery: Large caliber bifurcating no CAD.  Left anterior descending artery: Large caliber usual complement diagonals no CAD.  Left circumflex coronary artery: Moderate caliber nondominant supplying a long moderate caliber OM1 with a 98% focal midportion stenosis.  Postintervention 0% residual stenosis in the stented segment and GUICHO-3 flow.  Right coronary artery: Large caliber vessel no significant epicardial CAD.    III.  LEFT VENTRICULOGRAM:  LVEF GONZALEZ PROJECTION: 60%      Procedure details:  The risks and benefits of cardiac catheterization and possible intervention were explained to the patient including death, heart attack, stroke, and emergency surgery.  The patient was brought to the cardiac catheterization lab where the right wrist was prepped and draped in the usual manner for cardiac catheterization.  The area was anesthetized with lidocaine and a 5/6 FR sheath was inserted into the right radial artery without difficulty. A Carlos catheter was advanced to the ostia of the Left coronary artery and arteriograms were recorded.   A Carlos catheter was advanced to the ostia of the right coronary artery and arteriograms were  "recorded. Aortic valve was crossed using Carlos catheter for left heart catheterization was performed.     Description of PCI:  The decision was made to intervene on the culprit coronary artery.  Anticoagulation was initiated as below.  The diagnostic catheter was exchanged over a wire for an 6 Japanese EBU 3.5 guide seated appropriately. A 0.014\" mm  BMW was advanced into the artery and use to cross the lesion. A 2.0 mm balloon was used to predilate the lesion. A 2.25 x 15 mm Lydia FERNANDA was then positioned and deployed at nominal pressure. Following this a high-pressure inflation by the stenting balloon was used to post dilate the stent. There was an excellent angiographic result with GUICHO-3 flow and no residual stenosis in the stented segment. All catheters and guidewires were removed and the patient left the cath lab in stable condition.    At the completion of the case the sheath was removed and hemostasis achieved utilizing a radial compression band patient with Cath Lab in stable condition and there were no apparent complications.    Anticoagulant: Angiomax  Antiplatelet: Effient (prasugrel)  EBL <25 cc  Complications: none  Specimens: none  Contrast: 69 cc    Complications:  None apparent    Sedation time:  Moderate sedation directly monitored by me during the case while supervising the administration of the sedation medication by an independent trained RN to assist in the monitoring of the patient's level of consciousness and physiological status. I, the supervising physician was present the entire time from beginning of medication administration until the end of the procedure from 1409 until 1433. For detailed administration records please see the moderate sedation documentation in the media tab.    Following the procedure I discussed the results with the patient and the patients designated contact/family member Ene at [phone].  No answer but the voicemail box was identified as hers and a voicemail was " left.    Gigi Tuttle MD, FACC, [facility]

## 2021-07-26 NOTE — PROGRESS NOTES
Pt arrived via gurney. Ambulated to bed without assistance, heparin gtt infusing at 12 units/kg/h. No complaints of chest pain. Patient spoke with MD and verbalized understanding of POC. Patient oriented to floor and room. VSS, placed on telemetry monitor and verified with monitor room. Pt in SB 40's. Bed locked and in lowest position, call light within reach, no complaints at this time.

## 2021-07-26 NOTE — PROGRESS NOTES
"    [facility] HOSPITALIST TRIAGE OFFICER DIRECT ADMISSION REPORT      Transferring facility: [facility]  Transferring physician:  Dr Terrell    Chief complaint: NSTEMI  Pertinent history & patient course:     As per HPI from [facility]:  \"\"Vasile Bowers is a 70 y.o. male who presented 7/25/2021 with chest pain, he reports chest pain, then migratory pain to his shoulders and neck, occasionally radiating to his back.  Has never felt like this before, no previous MI. Overall feels he is a healthy person, recently moved and was lifting some heavy objects but he reports that was weeks ago. Pain work him from his sleep and he cannot get comfortable at home and here is the same. He is sitting up and trying to stretch but it's still achy dull and uncomfortable. It is not worse with exertion.  He has not tried any medications to alleviate his symptoms.  Has not had any leg swelling or calf pain.  No fevers or cough.  No vomiting or diarrhea.  No abdominal pain.  No back pain.     Due to back pain, CTA obtained, No PE, small aneurysm,  Mild aneurysmal dilatation of the ascending aorta measured at 4.2 x 4.1 cm.     Smoked >40 years ago and infrequent. No FHx.\"     Patient was slightly uncomfortable during his stay here, just couldn't get comfortable in bed. Trops negative x 2 and then third trop elevated 240. D/w Dr Narayanan of Cardiology, recommending tx for NSTEMI, transfer to [facility]. Can have ECHO performed at [facility]. \"        Code Status: Code Status will be confirmed upon patient arriving to [facility].     Patient accepted for transfer: Yes    Consultants to be called upon arrival: Dr Narayanan    Admission status: Inpatient.  Floor requested: Telemetry        Please inform the triage officer upon arrival of the patient to [facility] for assignment of a hospitalist to perform admission.     For any question or concerns regarding the care of this patient, please reach out to the assigned hospitalist.  "

## 2021-07-26 NOTE — H&P
Hospital Medicine History & Physical Note    Date of Service  7/25/2021    PCP: Diamond Clayton M.D.    CC:  Chest pain    HPI:   This is a 70 y.o. male with chest pain.     Per my H&P from [facility]:  Vasiel Bowers is a 70 y.o. male who presented 7/25/2021 with chest pain, he reports chest pain, then migratory pain to his shoulders and neck, occasionally radiating to his back.  Has never felt like this before, no previous MI. Overall feels he is a healthy person, recently moved and was lifting some heavy objects but he reports that was weeks ago. Pain work him from his sleep and he cannot get comfortable at home and here is the same. He is sitting up and trying to stretch but it's still achy dull and uncomfortable. It is not worse with exertion.  He has not tried any medications to alleviate his symptoms.  Has not had any leg swelling or calf pain.  No fevers or cough.  No vomiting or diarrhea.  No abdominal pain.  No back pain.     Due to back pain, CTA obtained, No PE, small aneurysm,  Mild aneurysmal dilatation of the ascending aorta measured at 4.2 x 4.1 cm.     Smoked >40 years ago and infrequent. No FHx.    Patient was uncomfortable during his stay but was mild, just restless in the bed, shoulder and back pain mostly. Trop bumped to 240, I discussed with Dr Narayanan who recommended NSTEMI tx and transfer to [facility]. ECHO ordered but can be done when patient arrives to [facility].     Consultants:   Cardiology - Oracio    ROS: Uncomfortable, denies f/c/n/v    PMHx:  None pertinent prior to admission      PSHx:   has a past surgical history that includes knee arthroscopy and shoulder arthroscopy.    SHx:   reports that he has quit smoking. He has never used smokeless tobacco. He reports current alcohol use. He reports that he does not use drugs.    FHx:  Reviewed with patient, no pertinent family history noted.    Allergies:  No Known Allergies    Medications:  Current Facility-Administered Medications on File  Prior to Encounter   Medication Dose Route Frequency Provider Last Rate Last Admin   • senna-docusate (PERICOLACE or SENOKOT S) 8.6-50 MG per tablet 2 tablet  2 tablet Oral BID Wilfredo Terrell M.D.        And   • polyethylene glycol/lytes (MIRALAX) PACKET 1 Packet  1 Packet Oral QDAY PRN Wilfredo Terrell M.D.        And   • magnesium hydroxide (MILK OF MAGNESIA) suspension 30 mL  30 mL Oral QDAY PRN Wilfredo Terrell M.D.        And   • bisacodyl (DULCOLAX) suppository 10 mg  10 mg Rectal QDAY PRN Wilfredo Terrell M.D.       • aspirin (ASA) tablet 325 mg  325 mg Oral DAILY Wilfredo Terrell M.D.        Or   • aspirin (ASA) chewable tab 324 mg  324 mg Oral DAILY Wilfredo Terrell M.D.        Or   • aspirin (ASA) suppository 300 mg  300 mg Rectal DAILY Wilfredo Terrell M.D.       • acetaminophen (Tylenol) tablet 650 mg  650 mg Oral Q6HRS PRN Wilfredo Terrell M.D.       • enalaprilat (VASOTEC) injection 1.25 mg  1.25 mg Intravenous Q6HRS PRMACIE Terrell M.D.       • labetalol (NORMODYNE/TRANDATE) injection 10 mg  10 mg Intravenous Q4HRS PRMACIE Terrell M.D.       • ondansetron (ZOFRAN) syringe/vial injection 4 mg  4 mg Intravenous Q4HRS PRMACIE Terrell M.D.       • ondansetron (ZOFRAN ODT) dispertab 4 mg  4 mg Oral Q4HRS PRN Wilfredo Terrell M.D.       • oxyCODONE immediate-release (ROXICODONE) tablet 5 mg  5 mg Oral Q4HRS PRMACIE Terrell M.D.       • oxyCODONE immediate release (ROXICODONE) tablet 10 mg  10 mg Oral Q4HRS PRN Wilfredo Terrell M.D.       • carvedilol (COREG) tablet 6.25 mg  6.25 mg Oral BID WITH MEALS Wilfredo Terrell M.D.   6.25 mg at 07/25/21 1815   • heparin infusion 25,000 units in 500 mL 0.45% NACL  0-30 Units/kg/hr Intravenous Continuous Wilfredo Terrell M.D.       • heparin injection 4,000 Units  4,000 Units Intravenous Once Wilfredo Terrell M.D.       • heparin injection 2,000 Units  2,000 Units Intravenous PRN Wilfredo Terrell M.D.       • nitroglycerin (NITROSTAT) tablet 0.4  mg  0.4 mg Sublingual Q5 MIN PRN Wilfredo Terrell M.D.       • morphine (pf) 4 mg/mL injection 2-4 mg  2-4 mg Intravenous Q5 MIN PRN Wilfredo Terrell M.D.       • atorvastatin (LIPITOR) tablet 80 mg  80 mg Oral Q EVENING Wilfredo Terrell M.D.   80 mg at 07/25/21 1815     Current Outpatient Medications on File Prior to Encounter   Medication Sig Dispense Refill   • sildenafil (REVATIO) 20 MG tablet Take 20 mg by mouth as needed (For erectile dysfunction). For erectile dysfunction     • Ascorbic Acid (VITAMIN C PO) Take 1 tablet by mouth every day.     • Cholecalciferol (D3 PO) Take 1 capsule by mouth every day.     • Cyanocobalamin (B-12 PO) Take 1 tablet by mouth every day.         Objective Exam:  There were no vitals filed for this visit.    Physical Exam  Vitals and nursing note reviewed.   Constitutional:       General: He is not in acute distress.     Appearance: He is well-developed.   HENT:      Head: Normocephalic and atraumatic.   Eyes:      General: No scleral icterus.     Conjunctiva/sclera: Conjunctivae normal.   Cardiovascular:      Rate and Rhythm: Normal rate.   Pulmonary:      Effort: Pulmonary effort is normal. No respiratory distress.      Breath sounds: No wheezing.   Abdominal:      General: Bowel sounds are normal. There is no distension.      Palpations: Abdomen is soft.   Musculoskeletal:         General: No tenderness.      Cervical back: Normal range of motion and neck supple. No rigidity.   Skin:     General: Skin is warm and dry.      Findings: No erythema.   Neurological:      General: No focal deficit present.      Mental Status: He is alert and oriented to person, place, and time.      Coordination: Coordination normal.   Psychiatric:         Mood and Affect: Mood normal.         Thought Content: Thought content normal.         Laboratory--reviewed personally and are as follows:  Lab Results   Component Value Date/Time    WBC 4.6 (L) 07/25/2021 06:11 AM    RBC 4.50 (L) 07/25/2021 06:11  AM    HEMOGLOBIN 15.2 07/25/2021 06:11 AM    HEMATOCRIT 43.7 07/25/2021 06:11 AM    MCV 97.1 07/25/2021 06:11 AM    MCH 33.8 (H) 07/25/2021 06:11 AM    MCHC 34.8 07/25/2021 06:11 AM    MPV 9.8 07/25/2021 06:11 AM    NEUTSPOLYS 53.30 07/25/2021 06:11 AM    LYMPHOCYTES 28.30 07/25/2021 06:11 AM    MONOCYTES 11.60 07/25/2021 06:11 AM    EOSINOPHILS 5.50 07/25/2021 06:11 AM    BASOPHILS 1.10 07/25/2021 06:11 AM      Lab Results   Component Value Date/Time    SODIUM 139 07/25/2021 06:11 AM    POTASSIUM 4.3 07/25/2021 06:11 AM    CHLORIDE 105 07/25/2021 06:11 AM    CO2 22 07/25/2021 06:11 AM    GLUCOSE 94 07/25/2021 06:11 AM    BUN 14 07/25/2021 06:11 AM    CREATININE 1.02 07/25/2021 06:11 AM      Lab Results   Component Value Date/Time    PROTHROMBTM 12.8 07/25/2021 06:03 PM    INR 1.04 07/25/2021 06:03 PM        Radiology  No orders to display       Assessment/Plan:  * NSTEMI (non-ST elevated myocardial infarction) (HCC)  Assessment & Plan  Initially admitted at [facility] for chest pain, first two troponins were negative with third elevated @ 240  EKG non ischemic  Patient was uncomfortable in bed with chest, shoulder and jaw pain  CTA negative for PE, small AAA noted  D/W Dr Narayanan, patient to transfer for further workup, ECHO pending    Continue ASA, BB, statin  Morphine PRN  Labs in the AM  Continue tele    Discussed with Dr Littlejohn transfer officer and Dr Narayanan, cardiology      It is expected patient will stay beyond 2 midnights.    VTE prophylaxis: heparin gtt

## 2021-07-27 VITALS
HEART RATE: 50 BPM | BODY MASS INDEX: 24.33 KG/M2 | HEIGHT: 69 IN | SYSTOLIC BLOOD PRESSURE: 115 MMHG | DIASTOLIC BLOOD PRESSURE: 63 MMHG | WEIGHT: 164.24 LBS | RESPIRATION RATE: 18 BRPM | OXYGEN SATURATION: 97 % | TEMPERATURE: 97.3 F

## 2021-07-27 LAB
ANION GAP SERPL CALC-SCNC: 10 MMOL/L (ref 7–16)
BUN SERPL-MCNC: 15 MG/DL (ref 8–22)
CALCIUM SERPL-MCNC: 8.5 MG/DL (ref 8.5–10.5)
CHLORIDE SERPL-SCNC: 110 MMOL/L (ref 96–112)
CO2 SERPL-SCNC: 23 MMOL/L (ref 20–33)
CREAT SERPL-MCNC: 0.87 MG/DL (ref 0.5–1.4)
ERYTHROCYTE [DISTWIDTH] IN BLOOD BY AUTOMATED COUNT: 42.2 FL (ref 35.9–50)
GLUCOSE SERPL-MCNC: 88 MG/DL (ref 65–99)
HCT VFR BLD AUTO: 39.4 % (ref 42–52)
HGB BLD-MCNC: 13.8 G/DL (ref 14–18)
MCH RBC QN AUTO: 33.7 PG (ref 27–33)
MCHC RBC AUTO-ENTMCNC: 35 G/DL (ref 33.7–35.3)
MCV RBC AUTO: 96.3 FL (ref 81.4–97.8)
PLATELET # BLD AUTO: 132 K/UL (ref 164–446)
PMV BLD AUTO: 9.9 FL (ref 9–12.9)
POTASSIUM SERPL-SCNC: 4 MMOL/L (ref 3.6–5.5)
RBC # BLD AUTO: 4.09 M/UL (ref 4.7–6.1)
SODIUM SERPL-SCNC: 143 MMOL/L (ref 135–145)
WBC # BLD AUTO: 6 K/UL (ref 4.8–10.8)

## 2021-07-27 PROCEDURE — 99239 HOSP IP/OBS DSCHRG MGMT >30: CPT | Mod: GC | Performed by: INTERNAL MEDICINE

## 2021-07-27 PROCEDURE — 80048 BASIC METABOLIC PNL TOTAL CA: CPT

## 2021-07-27 PROCEDURE — 99232 SBSQ HOSP IP/OBS MODERATE 35: CPT | Performed by: INTERNAL MEDICINE

## 2021-07-27 PROCEDURE — 94760 N-INVAS EAR/PLS OXIMETRY 1: CPT

## 2021-07-27 PROCEDURE — 700105 HCHG RX REV CODE 258: Performed by: INTERNAL MEDICINE

## 2021-07-27 PROCEDURE — 97161 PT EVAL LOW COMPLEX 20 MIN: CPT

## 2021-07-27 PROCEDURE — A9270 NON-COVERED ITEM OR SERVICE: HCPCS | Performed by: INTERNAL MEDICINE

## 2021-07-27 PROCEDURE — 36415 COLL VENOUS BLD VENIPUNCTURE: CPT

## 2021-07-27 PROCEDURE — 85027 COMPLETE CBC AUTOMATED: CPT

## 2021-07-27 PROCEDURE — A9270 NON-COVERED ITEM OR SERVICE: HCPCS | Performed by: STUDENT IN AN ORGANIZED HEALTH CARE EDUCATION/TRAINING PROGRAM

## 2021-07-27 PROCEDURE — 700102 HCHG RX REV CODE 250 W/ 637 OVERRIDE(OP): Performed by: STUDENT IN AN ORGANIZED HEALTH CARE EDUCATION/TRAINING PROGRAM

## 2021-07-27 PROCEDURE — 700102 HCHG RX REV CODE 250 W/ 637 OVERRIDE(OP): Performed by: INTERNAL MEDICINE

## 2021-07-27 RX ORDER — PRASUGREL 10 MG/1
10 TABLET, FILM COATED ORAL DAILY
Qty: 30 TABLET | Refills: 0 | Status: SHIPPED | OUTPATIENT
Start: 2021-07-28 | End: 2021-07-27

## 2021-07-27 RX ORDER — CARVEDILOL 3.12 MG/1
3.12 TABLET ORAL 2 TIMES DAILY WITH MEALS
Qty: 60 TABLET | Refills: 2 | Status: SHIPPED
Start: 2021-07-27 | End: 2021-08-10

## 2021-07-27 RX ORDER — CARVEDILOL 3.12 MG/1
3.12 TABLET ORAL 2 TIMES DAILY WITH MEALS
Status: DISCONTINUED | OUTPATIENT
Start: 2021-07-27 | End: 2021-07-27 | Stop reason: HOSPADM

## 2021-07-27 RX ORDER — LISINOPRIL 5 MG/1
5 TABLET ORAL DAILY
Qty: 30 TABLET | Refills: 2 | Status: SHIPPED | OUTPATIENT
Start: 2021-07-28 | End: 2021-08-10 | Stop reason: SDUPTHER

## 2021-07-27 RX ORDER — LISINOPRIL 5 MG/1
5 TABLET ORAL
Status: DISCONTINUED | OUTPATIENT
Start: 2021-07-27 | End: 2021-07-27 | Stop reason: HOSPADM

## 2021-07-27 RX ORDER — ATORVASTATIN CALCIUM 80 MG/1
80 TABLET, FILM COATED ORAL EVERY EVENING
Qty: 30 TABLET | Refills: 2 | Status: SHIPPED | OUTPATIENT
Start: 2021-07-27 | End: 2021-08-10

## 2021-07-27 RX ORDER — NITROGLYCERIN 0.4 MG/1
0.4 TABLET SUBLINGUAL PRN
Qty: 25 TABLET | Refills: 0 | Status: SHIPPED
Start: 2021-07-27 | End: 2021-08-10

## 2021-07-27 RX ORDER — ASPIRIN 81 MG/1
81 TABLET ORAL DAILY
Qty: 30 TABLET | Refills: 2 | Status: ON HOLD | OUTPATIENT
Start: 2021-07-28 | End: 2023-12-04

## 2021-07-27 RX ORDER — PRASUGREL 10 MG/1
10 TABLET, FILM COATED ORAL DAILY
Qty: 30 TABLET | Refills: 2 | Status: SHIPPED | OUTPATIENT
Start: 2021-07-28 | End: 2021-08-10 | Stop reason: SDUPTHER

## 2021-07-27 RX ADMIN — SODIUM CHLORIDE: 9 INJECTION, SOLUTION INTRAVENOUS at 04:46

## 2021-07-27 RX ADMIN — ASPIRIN 81 MG: 81 TABLET, COATED ORAL at 05:12

## 2021-07-27 RX ADMIN — LISINOPRIL 5 MG: 5 TABLET ORAL at 07:48

## 2021-07-27 RX ADMIN — PRASUGREL 10 MG: 10 TABLET, FILM COATED ORAL at 05:13

## 2021-07-27 ASSESSMENT — GAIT ASSESSMENTS
GAIT LEVEL OF ASSIST: MODIFIED INDEPENDENT
DEVIATION: NO DEVIATION
DISTANCE (FEET): 50

## 2021-07-27 ASSESSMENT — ENCOUNTER SYMPTOMS
CHEST TIGHTNESS: 0
ABDOMINAL PAIN: 0
DIZZINESS: 0
NERVOUS/ANXIOUS: 0
FEVER: 0
COLOR CHANGE: 0
COUGH: 0
CHILLS: 0
NUMBNESS: 0
DIAPHORESIS: 0
AGITATION: 0
SHORTNESS OF BREATH: 0
TROUBLE SWALLOWING: 0
PALPITATIONS: 0
CONFUSION: 0
BLOOD IN STOOL: 0
ABDOMINAL DISTENTION: 0

## 2021-07-27 ASSESSMENT — COGNITIVE AND FUNCTIONAL STATUS - GENERAL
MOBILITY SCORE: 24
SUGGESTED CMS G CODE MODIFIER MOBILITY: CH

## 2021-07-27 NOTE — PROGRESS NOTES
Monitor Summary  Rhythm: SB  Rate: 41-56  Ectopy: R PVC  Occasional Junctional rhythm  0.17 / 0.07 / 0.40

## 2021-07-27 NOTE — PROGRESS NOTES
Cardiology Follow Up Progress Note    Date of Service  7/27/2021    Attending Physician  Wilfred Bahena M.D.    Chief Complaint   Chest pain     HPI  Vasile Bowers is a 70 y.o. male admitted 7/25/2021 with chest pain with pain radiating to his shoulders and neck. Transferred from [facility] for NSTEMI. Trop T peaked at 1676.     History of tobacco abuse.    Interim Events  Patient is doing well. Denies any chest pain today. Ambulated in the hallway without any chest pain or SOB  SB 43-59 overnight on the monitor   Right radial is soft and good peripheral pulses     Review of Systems  Review of Systems   Constitutional: Negative for chills, diaphoresis and fever.   HENT: Negative for nosebleeds and trouble swallowing.    Respiratory: Negative for cough, chest tightness and shortness of breath.    Cardiovascular: Negative for chest pain, palpitations and leg swelling.   Gastrointestinal: Negative for abdominal distention, abdominal pain and blood in stool.   Genitourinary: Negative for hematuria.   Skin: Negative for color change.   Neurological: Negative for dizziness, syncope and numbness.   Psychiatric/Behavioral: Negative for agitation and confusion. The patient is not nervous/anxious.        Vital signs in last 24 hours  Temp:  [35.7 °C (96.3 °F)-37.1 °C (98.7 °F)] 36.3 °C (97.3 °F)  Pulse:  [43-95] 50  Resp:  [13-18] 16  BP: (110-135)/(64-84) 118/75  SpO2:  [93 %-96 %] 96 %    Physical Exam  Physical Exam  Vitals and nursing note reviewed.   Constitutional:       Appearance: Normal appearance.   HENT:      Head: Normocephalic and atraumatic.   Eyes:      Pupils: Pupils are equal, round, and reactive to light.   Cardiovascular:      Rate and Rhythm: Normal rate and regular rhythm.      Heart sounds: Normal heart sounds. No murmur heard.     Pulmonary:      Effort: Pulmonary effort is normal.      Breath sounds: Normal breath sounds.   Abdominal:      General: Abdomen is flat.   Musculoskeletal:       Cervical back: Normal range of motion.   Skin:     General: Skin is warm and dry.   Neurological:      General: No focal deficit present.      Mental Status: He is alert and oriented to person, place, and time.   Psychiatric:         Mood and Affect: Mood normal.         Behavior: Behavior normal.         Thought Content: Thought content normal.         Judgment: Judgment normal.         Lab Review  Lab Results   Component Value Date/Time    WBC 6.0 07/27/2021 02:53 AM    RBC 4.09 (L) 07/27/2021 02:53 AM    HEMOGLOBIN 13.8 (L) 07/27/2021 02:53 AM    HEMATOCRIT 39.4 (L) 07/27/2021 02:53 AM    MCV 96.3 07/27/2021 02:53 AM    MCH 33.7 (H) 07/27/2021 02:53 AM    MCHC 35.0 07/27/2021 02:53 AM    MPV 9.9 07/27/2021 02:53 AM      Lab Results   Component Value Date/Time    SODIUM 143 07/27/2021 02:53 AM    POTASSIUM 4.0 07/27/2021 02:53 AM    CHLORIDE 110 07/27/2021 02:53 AM    CO2 23 07/27/2021 02:53 AM    GLUCOSE 88 07/27/2021 02:53 AM    BUN 15 07/27/2021 02:53 AM    CREATININE 0.87 07/27/2021 02:53 AM      Lab Results   Component Value Date/Time    ASTSGOT 108 (H) 07/26/2021 02:59 AM    ALTSGPT 27 07/26/2021 02:59 AM     Lab Results   Component Value Date/Time    CHOLSTRLTOT 185 07/26/2021 02:59 AM    LDL 98 07/26/2021 02:59 AM    HDL 75 07/26/2021 02:59 AM    TRIGLYCERIDE 62 07/26/2021 02:59 AM    TROPONINT 1676 (H) 07/26/2021 02:59 AM       No results for input(s): NTPROBNP in the last 72 hours.    Cardiac Imaging and Procedures Review  EKG:  SB 40-60    Echocardiogram:  7/26/2021  Left Ventricle  Normal left ventricular chamber size. Normal left ventricular wall   thickness. Normal left ventricular systolic function. Left ventricular   ejection fraction is visually estimated to be 55%. Normal regional wall   motion. Normal diastolic function.     Right Ventricle  The right ventricle was normal in size and function.     Right Atrium  The right atrium is normal in size. Normal inferior vena cava size   without  inspiratory collapse.     Left Atrium  The left atrium is normal in size. Left atrial volume index is 17 mL/sq   m.     Mitral Valve  Structurally normal mitral valve. No mitral stenosis. Trivial mitral   regurgitation.     Aortic Valve  Structurally normal aortic valve without significant stenosis or   regurgitation.     Tricuspid Valve  Structurally normal tricuspid valve without significant stenosis or   regurgitation.  Unable to estimate pulmonary artery pressure due to an   inadequate tricuspid regurgitant jet.     Pulmonic Valve  No pulmonic stenosis. Mild pulmonic insufficiency.     Pericardium  Normal pericardium without effusion.     Aorta  The aortic root is normal. Ascending aorta is dilated with a diameter   of 4.1 cm.    Cardiac Catheterization:   7/26/2021  POSTOPERATIVE DIAGNOSIS:  1. 98% focal culprit OM 1 stenosis  2. Single-vessel coronary disease  3. LVEF 65%, LVEDP 10 mmHg  4. Successful PCI culprit OM1 (2.25 x 15 mm [place] FERNANDA), excellent result  Left main coronary artery: Large caliber bifurcating no CAD.  Left anterior descending artery: Large caliber usual complement diagonals no CAD.  Left circumflex coronary artery: Moderate caliber nondominant supplying a long moderate caliber OM1 with a 98% focal midportion stenosis.  Postintervention 0% residual stenosis in the stented segment and GUICHO-3 flow.  Right coronary artery: Large caliber vessel no significant epicardial CAD.     III.  LEFT VENTRICULOGRAM:  LVEF GONZALEZ PROJECTION: 60%      Imaging  CTA  7/25/2021  1.  Mild aneurysmal dilatation of the ascending aorta measured at 4.2 x 4.1 cm.     2.  No evidence of aortic dissection.     3.  Fatty change of the liver.      Assessment/Plan  No new Assessment & Plan notes have been filed under this hospital service since the last note was generated.  Service: Cardiology    1. NSTEMI:  - trop T peaked at 1676  - PCI to OM 1   - continue asa 81mg qd, effient 10mg qd , and atorvastatin 80mg qd   - ECHO  showed ef 55%   - unable to tolerate bb due to bradycardia     2. Hypertension:  - stable   - continue lisinopril 5mg qd       Future Appointments   Date Time Provider Department Center   8/10/2021 10:00 AM ADELSO Arenas [facility] None       Thank you for allowing me to participate in the care of this patient.  Cardiology will sign off.     Please contact me with any questions.    ADELSO Arenas

## 2021-07-27 NOTE — PROGRESS NOTES
Pt given discharge instructions, pt verbalizes understanding. Pt discharged home via car with family, all belongings with pt.

## 2021-07-27 NOTE — THERAPY
Physical Therapy   Initial Evaluation     Patient Name: Vasile Bowers  Age:  70 y.o., Sex:  male  Medical Record #: [id number]  Today's Date: 7/27/2021          Assessment  Mr. Bowers is a 71 y/o male who presents to acute secondary to NSTEMI s/p stenting. Provided patient and spouse with cardiac rehab handout and reviewed including talk test, RPE scale, walking program, and activity recommendations. Both patient and spouse demonstrated understanding. Pt reported asymptomatic with all mobility. No additional acute PT needs.    Plan    Recommend Physical Therapy for Evaluation only   DC Equipment Recommendations: None  Discharge Recommendations: Anticipate that the patient will have no further physical therapy needs after discharge from the hospital            Objective       07/27/21 0835   Prior Living Situation   Prior Services None   Housing / Facility 1 Story House   Equipment Owned None   Lives with - Patient's Self Care Capacity Spouse   Prior Level of Functional Mobility   Bed Mobility Independent   Transfer Status Independent   Ambulation Independent   Distance Ambulation (Feet)   (community ambulator)   Assistive Devices Used None   Cognition    Cognition / Consciousness WDL   Passive ROM Lower Body   Passive ROM Lower Body WDL   Active ROM Lower Body    Active ROM Lower Body  WDL   Strength Lower Body   Lower Body Strength  WDL   Balance Assessment   Sitting Balance (Static) Good   Sitting Balance (Dynamic) Good   Standing Balance (Static) Good   Standing Balance (Dynamic) Good   Weight Shift Sitting Good   Weight Shift Standing Good   Comments no AD   Gait Analysis   Gait Level Of Assist Modified Independent   Assistive Device None   Distance (Feet) 50   Deviation No deviation   Bed Mobility    Supine to Sit Modified Independent   Sit to Supine   (at EOB)   Functional Mobility   Sit to Stand Modified Independent

## 2021-07-27 NOTE — DISCHARGE INSTRUCTIONS
Discharge Instructions    Discharged to home by car with relative. Discharged via wheelchair, hospital escort: Yes.  Special equipment needed: Not Applicable    Be sure to schedule a follow-up appointment with your primary care doctor or any specialists as instructed.     Discharge Plan:   Diet Plan: Discussed  Activity Level: Discussed  Confirmed Follow up Appointment: Appointment Scheduled  Confirmed Symptoms Management: Discussed  Medication Reconciliation Updated: Yes    I understand that a diet low in cholesterol, fat, and sodium is recommended for good health. Unless I have been given specific instructions below for another diet, I accept this instruction as my diet prescription.   Other diet: Cardiac    Special Instructions: Diagnosis:  Acute Coronary Syndrome (ACS) is a diagnosis that encompasses cardiac-related chest pain and heart attack. ACS occurs when the blood flow to the heart muscle is severely reduced or cut off completely due to a slow process called atherosclerosis.  Atherosclerosis is a disease in which the coronary arteries become narrow from a buildup of fat, cholesterol, and other substances that combine to form plaque. If the plaque breaks, a blood clot will form and block the blood flow to the heart muscle. This lack of blood flow can cause damage or death to the heart muscle which is called a heart attack or Myocardial Infarction (MI). There are two different types of MIs:  ST Elevation Myocardial Infarction or STEMI (the most severe type of heart attack) and Non-ST Elevation Myocardial Infarction or NSTEMI.    Treatment Plan:  · Cardiac Diet  - Low fat, low salt, low cholesterol   · Cardiac Rehab  - Your doctor has ordered you a referral to [facility].  Call [phone] to schedule an appointment.  · Attend my follow-up appointment with my Cardiologist.  · Take my medications as prescribed by my doctor  · Exercise daily  · Quit Smoking and Reduce stress    Medications:  Certain medications  are used to treat ACS.  Remember to always take medications as prescribed and never stop talking medications unless told by your doctor.    You have been prescribed the following medicatons:    Aspirin - Aspirin is used as a blood thinning medication and you will require this medication indefinitely.  Anti-platelet/blood thinner - Your Anti-platelet/Blood thinning medication is called Prasugrel, and is used in combination with aspirin to prevent clots from forming in your heart and/or around your stent.  Your doctor will determine how long you need to be on this medicine.  Beta-Blocker - Beta-Blocker Carvedilol is used to lower blood pressure and heart rate, and/or helps your heart heal after a heart attack.  Statin - Statin Atorvastatin is used to lower cholesterol.  Angiotensin Converting Enzyme (ACE) Inhibitor - Angiotensin Converting Enzyme Inhibitor Lisinopril is used to lower blood pressure and treat heart failure.  Nitroglycerine - Nitroglycerine is used to relieve chest pain.    · Is patient discharged on Warfarin / Coumadin?   No     Aspirin, ASA oral tablets  What is this medicine?  ASPIRIN (AS pir in) is a pain reliever. It is used to treat mild pain and fever. This medicine is also used as directed by a doctor to prevent and to treat heart attacks, to prevent strokes and blood clots, and to treat arthritis or inflammation.  This medicine may be used for other purposes; ask your health care provider or pharmacist if you have questions.  COMMON BRAND NAME(S): Aspir-Low, Aspir-Veronica, Aspirtab, Nissa Advanced Aspirin, Nissa Aspirin, Nissa Aspirin Extra Strength, Nissa Aspirin Plus, Nissa Extra Strength, Nissa Extra Strength Plus, Nissa Genuine Aspirin, Nissa Womens Aspirin, Bufferin, Bufferin Extra Strength, Bufferin Low Dose  What should I tell my health care provider before I take this medicine?  They need to know if you have any of these conditions:  · anemia  · asthma  · bleeding problems  · child with  chickenpox, the flu, or other viral infection  · diabetes  · gout  · if you frequently drink alcohol containing drinks  · kidney disease  · liver disease  · low level of vitamin K  · lupus  · smoke tobacco  · stomach ulcers or other problems  · an unusual or allergic reaction to aspirin, tartrazine dye, other medicines, dyes, or preservatives  · pregnant or trying to get pregnant  · breast-feeding  How should I use this medicine?  Take this medicine by mouth with a glass of water. Follow the directions on the package or prescription label. You can take this medicine with or without food. If it upsets your stomach, take it with food. Do not take your medicine more often than directed.  Talk to your pediatrician regarding the use of this medicine in children. While this drug may be prescribed for children as young as 12 years of age for selected conditions, precautions do apply. Children and teenagers should not use this medicine to treat chicken pox or flu symptoms unless directed by a doctor.  Patients over 65 years old may have a stronger reaction and need a smaller dose.  Overdosage: If you think you have taken too much of this medicine contact a poison control center or emergency room at once.  NOTE: This medicine is only for you. Do not share this medicine with others.  What if I miss a dose?  If you are taking this medicine on a regular schedule and miss a dose, take it as soon as you can. If it is almost time for your next dose, take only that dose. Do not take double or extra doses.  What may interact with this medicine?  Do not take this medicine with any of the following medications:  · cidofovir  · ketorolac  · probenecid  This medicine may also interact with the following medications:  · alcohol  · alendronate  · bismuth subsalicylate  · flavocoxid  · herbal supplements like feverfew, garlic, shira, ginkgo biloba, horse chestnut  · medicines for diabetes or glaucoma like acetazolamide,  methazolamide  · medicines for gout  · medicines that treat or prevent blood clots like enoxaparin, heparin, ticlopidine, warfarin  · other aspirin and aspirin-like medicines  · NSAIDs, medicines for pain and inflammation, like ibuprofen or naproxen  · pemetrexed  · sulfinpyrazone  · varicella live vaccine  This list may not describe all possible interactions. Give your health care provider a list of all the medicines, herbs, non-prescription drugs, or dietary supplements you use. Also tell them if you smoke, drink alcohol, or use illegal drugs. Some items may interact with your medicine.  What should I watch for while using this medicine?  If you are treating yourself for pain, tell your doctor or health care professional if the pain lasts more than 10 days, if it gets worse, or if there is a new or different kind of pain. Tell your doctor if you see redness or swelling. Also, check with your doctor if you have a fever that lasts for more than 3 days. Only take this medicine to prevent heart attacks or blood clotting if prescribed by your doctor or health care professional.  Do not take aspirin or aspirin-like medicines with this medicine. Too much aspirin can be dangerous. Always read the labels carefully.  This medicine can irritate your stomach or cause bleeding problems. Do not smoke cigarettes or drink alcohol while taking this medicine. Do not lie down for 30 minutes after taking this medicine to prevent irritation to your throat.  If you are scheduled for any medical or dental procedure, tell your healthcare provider that you are taking this medicine. You may need to stop taking this medicine before the procedure.  This medicine may be used to treat migraines. If you take migraine medicines for 10 or more days a month, your migraines may get worse. Keep a diary of headache days and medicine use. Contact your healthcare professional if your migraine attacks occur more frequently.  What side effects may I  notice from receiving this medicine?  Side effects that you should report to your doctor or health care professional as soon as possible:  · allergic reactions like skin rash, itching or hives, swelling of the face, lips, or tongue  · breathing problems  · changes in hearing, ringing in the ears  · confusion  · general ill feeling or flu-like symptoms  · pain on swallowing  · redness, blistering, peeling or loosening of the skin, including inside the mouth or nose  · signs and symptoms of bleeding such as bloody or black, tarry stools; red or dark-brown urine; spitting up blood or brown material that looks like coffee grounds; red spots on the skin; unusual bruising or bleeding from the eye, gums, or nose  · trouble passing urine or change in the amount of urine  · unusually weak or tired  · yellowing of the eyes or skin  Side effects that usually do not require medical attention (report to your doctor or health care professional if they continue or are bothersome):  · diarrhea or constipation  · headache  · nausea, vomiting  · stomach gas, heartburn  This list may not describe all possible side effects. Call your doctor for medical advice about side effects. You may report side effects to FDA at 9-465-FDA-4376.  Where should I keep my medicine?  Keep out of the reach of children.  Store at room temperature between 15 and 30 degrees C (59 and 86 degrees F). Protect from heat and moisture. Do not use this medicine if it has a strong vinegar smell. Throw away any unused medicine after the expiration date.  NOTE: This sheet is a summary. It may not cover all possible information. If you have questions about this medicine, talk to your doctor, pharmacist, or health care provider.  © 2020 [employer] Standard (2018-01-30 10:42:13)    Atorvastatin tablets  What is this medicine?  ATORVASTATIN (a TORE va sta tin) is known as a HMG-CoA reductase inhibitor or 'statin'. It lowers the level of cholesterol and triglycerides  in the blood. This drug may also reduce the risk of heart attack, stroke, or other health problems in patients with risk factors for heart disease. Diet and lifestyle changes are often used with this drug.  This medicine may be used for other purposes; ask your health care provider or pharmacist if you have questions.  COMMON BRAND NAME(S): Lipitor  What should I tell my health care provider before I take this medicine?  They need to know if you have any of these conditions:  · diabetes  · if you often drink alcohol  · history of stroke  · kidney disease  · liver disease  · muscle aches or weakness  · thyroid disease  · an unusual or allergic reaction to atorvastatin, other medicines, foods, dyes, or preservatives  · pregnant or trying to get pregnant  · breast-feeding  How should I use this medicine?  Take this medicine by mouth with a glass of water. Follow the directions on the prescription label. You can take it with or without food. If it upsets your stomach, take it with food. Do not take with grapefruit juice. Take your medicine at regular intervals. Do not take it more often than directed. Do not stop taking except on your doctor's advice.  Talk to your pediatrician regarding the use of this medicine in children. While this drug may be prescribed for children as young as 10 for selected conditions, precautions do apply.  Overdosage: If you think you have taken too much of this medicine contact a poison control center or emergency room at once.  NOTE: This medicine is only for you. Do not share this medicine with others.  What if I miss a dose?  If you miss a dose, take it as soon as you can. If your next dose is to be taken in less than 12 hours, then do not take the missed dose. Take the next dose at your regular time. Do not take double or extra doses.  What may interact with this medicine?  Do not take this medicine with any of the following medications:  · dasabuvir; ombitasvir; paritaprevir;  ritonavir  · ombitasvir; paritaprevir; ritonavir  · posaconazole  · red yeast rice  This medicine may also interact with the following medications:  · alcohol  · birth control pills  · certain antibiotics like erythromycin and clarithromycin  · certain antivirals for HIV or hepatitis  · certain medicines for cholesterol like fenofibrate, gemfibrozil, and niacin  · certain medicines for fungal infections like ketoconazole and itraconazole  · colchicine  · cyclosporine  · digoxin  · grapefruit juice  · rifampin  This list may not describe all possible interactions. Give your health care provider a list of all the medicines, herbs, non-prescription drugs, or dietary supplements you use. Also tell them if you smoke, drink alcohol, or use illegal drugs. Some items may interact with your medicine.  What should I watch for while using this medicine?  Visit your doctor or health care professional for regular check-ups. You may need regular tests to make sure your liver is working properly.  Your health care professional may tell you to stop taking this medicine if you develop muscle problems. If your muscle problems do not go away after stopping this medicine, contact your health care professional.  Do not become pregnant while taking this medicine. Women should inform their health care professional if they wish to become pregnant or think they might be pregnant. There is a potential for serious side effects to an unborn child. Talk to your health care professional or pharmacist for more information. Do not breast-feed an infant while taking this medicine.  This medicine may increase blood sugar. Ask your healthcare provider if changes in diet or medicines are needed if you have diabetes.  If you are going to need surgery or other procedure, tell your doctor that you are using this medicine.  This drug is only part of a total heart-health program. Your doctor or a dietician can suggest a low-cholesterol and low-fat diet to  help. Avoid alcohol and smoking, and keep a proper exercise schedule.  This medicine may cause a decrease in Co-Enzyme Q-10. You should make sure that you get enough Co-Enzyme Q-10 while you are taking this medicine. Discuss the foods you eat and the vitamins you take with your health care professional.  What side effects may I notice from receiving this medicine?  Side effects that you should report to your doctor or health care professional as soon as possible:  · allergic reactions like skin rash, itching or hives, swelling of the face, lips, or tongue  · fever  · joint pain  · loss of memory  · redness, blistering, peeling or loosening of the skin, including inside the mouth  · signs and symptoms of high blood sugar such as being more thirsty or hungry or having to urinate more than normal. You may also feel very tired or have blurry vision.  · signs and symptoms of liver injury like dark yellow or brown urine; general ill feeling or flu-like symptoms; light-belly pain; unusually weak or tired; yellowing of the eyes or skin  · signs and symptoms of muscle injury like dark urine; trouble passing urine or change in the amount of urine; unusually weak or tired; muscle pain or side or back pain  Side effects that usually do not require medical attention (report to your doctor or health care professional if they continue or are bothersome):  · diarrhea  · nausea  · stomach pain  · trouble sleeping  · upset stomach  This list may not describe all possible side effects. Call your doctor for medical advice about side effects. You may report side effects to FDA at 4-926-FDA-4832.  Where should I keep my medicine?  Keep out of the reach of children.  Store between 20 and 25 degrees C (68 and 77 degrees F). Throw away any unused medicine after the expiration date.  NOTE: This sheet is a summary. It may not cover all possible information. If you have questions about this medicine, talk to your doctor, pharmacist, or health  care provider.  © 2020 [employer]/Gold Standard (2019-10-09 11:36:16)    Carvedilol tablets  What is this medicine?  CARVEDILOL (YARELIS ve dil ol) is a beta-blocker. Beta-blockers reduce the workload on the heart and help it to beat more regularly. This medicine is used to treat high blood pressure and heart failure.  This medicine may be used for other purposes; ask your health care provider or pharmacist if you have questions.  COMMON BRAND NAME(S): Coreg  What should I tell my health care provider before I take this medicine?  They need to know if you have any of these conditions:  · circulation problems  · diabetes  · history of heart attack or heart disease  · liver disease  · lung or breathing disease, like asthma or emphysema  · pheochromocytoma  · slow or irregular heartbeat  · thyroid disease  · an unusual or allergic reaction to carvedilol, other beta-blockers, medicines, foods, dyes, or preservatives  · pregnant or trying to get pregnant  · breast-feeding  How should I use this medicine?  Take this medicine by mouth with a glass of water. Follow the directions on the prescription label. It is best to take the tablets with food. Take your doses at regular intervals. Do not take your medicine more often than directed. Do not stop taking except on the advice of your doctor or health care professional.  Talk to your pediatrician regarding the use of this medicine in children. Special care may be needed.  Overdosage: If you think you have taken too much of this medicine contact a poison control center or emergency room at once.  NOTE: This medicine is only for you. Do not share this medicine with others.  What if I miss a dose?  If you miss a dose, take it as soon as you can. If it is almost time for your next dose, take only that dose. Do not take double or extra doses.  What may interact with this medicine?  This medicine may interact with the following medications:  · certain medicines for blood pressure, heart  disease, irregular heart beat  · certain medicines for depression, like fluoxetine or paroxetine  · certain medicines for diabetes, like glipizide or glyburide  · cimetidine  · clonidine  · cyclosporine  · digoxin  · MAOIs like Carbex, Eldepryl, Marplan, Nardil, and Parnate  · reserpine  · rifampin  This list may not describe all possible interactions. Give your health care provider a list of all the medicines, herbs, non-prescription drugs, or dietary supplements you use. Also tell them if you smoke, drink alcohol, or use illegal drugs. Some items may interact with your medicine.  What should I watch for while using this medicine?  Check your heart rate and blood pressure regularly while you are taking this medicine. Ask your doctor or health care professional what your heart rate and blood pressure should be, and when you should contact him or her. Do not stop taking this medicine suddenly. This could lead to serious heart-related effects.  Contact your doctor or health care professional if you have difficulty breathing while taking this drug.  Check your weight daily. Ask your doctor or health care professional when you should notify him/her of any weight gain.  You may get drowsy or dizzy. Do not drive, use machinery, or do anything that requires mental alertness until you know how this medicine affects you. To reduce the risk of dizzy or fainting spells, do not sit or stand up quickly. Alcohol can make you more drowsy, and increase flushing and rapid heartbeats. Avoid alcoholic drinks.  This medicine may increase blood sugar. Ask your healthcare provider if changes in diet or medicines are needed if you have diabetes.  If you are going to have surgery, tell your doctor or health care professional that you are taking this medicine.  What side effects may I notice from receiving this medicine?  Side effects that you should report to your doctor or health care professional as soon as possible:  · allergic  reactions like skin rash, itching or hives, swelling of the face, lips, or tongue  · breathing problems  · dark urine  · irregular heartbeat  ·   signs and symptoms of high blood sugar such as being more thirsty or hungry or having to urinate more than normal. You may also feel very tired or have blurry vision.  · swollen legs or ankles  · vomiting  · yellowing of the eyes or skin  Side effects that usually do not require medical attention (report to your doctor or health care professional if they continue or are bothersome):  · change in sex drive or performance  · diarrhea  · dry eyes (especially if wearing contact lenses)  · dry, itching skin  · headache  · nausea  · unusually tired  This list may not describe all possible side effects. Call your doctor for medical advice about side effects. You may report side effects to FDA at 5-662-FDA-8108.  Where should I keep my medicine?  Keep out of the reach of children.  Store at room temperature below 30 degrees C (86 degrees F). Protect from moisture. Keep container tightly closed. Throw away any unused medicine after the expiration date.  NOTE: This sheet is a summary. It may not cover all possible information. If you have questions about this medicine, talk to your doctor, pharmacist, or health care provider.  © 2020 [employer] Standard (2019-10-09 09:13:57)    Lisinopril tablets  What is this medicine?  LISINOPRIL (lyse IN oh pril) is an ACE inhibitor. This medicine is used to treat high blood pressure and heart failure. It is also used to protect the heart immediately after a heart attack.  This medicine may be used for other purposes; ask your health care provider or pharmacist if you have questions.  COMMON BRAND NAME(S): Prinivil, Zestril  What should I tell my health care provider before I take this medicine?  They need to know if you have any of these conditions:  · diabetes  · heart or blood vessel disease  · kidney disease  · low blood  pressure  · previous swelling of the tongue, face, or lips with difficulty breathing, difficulty swallowing, hoarseness, or tightening of the throat  · an unusual or allergic reaction to lisinopril, other ACE inhibitors, insect venom, foods, dyes, or preservatives  · pregnant or trying to get pregnant  · breast-feeding  How should I use this medicine?  Take this medicine by mouth with a glass of water. Follow the directions on your prescription label. You may take this medicine with or without food. If it upsets your stomach, take it with food. Take your medicine at regular intervals. Do not take it more often than directed. Do not stop taking except on your doctor's advice.  Talk to your pediatrician regarding the use of this medicine in children. Special care may be needed. While this drug may be prescribed for children as young as 6 years of age for selected conditions, precautions do apply.  Overdosage: If you think you have taken too much of this medicine contact a poison control center or emergency room at once.  NOTE: This medicine is only for you. Do not share this medicine with others.  What if I miss a dose?  If you miss a dose, take it as soon as you can. If it is almost time for your next dose, take only that dose. Do not take double or extra doses.  What may interact with this medicine?  Do not take this medicine with any of the following medications:  · hymenoptera venom  · sacubitril; valsartan  This medicines may also interact with the following medications:  · aliskiren  · angiotensin receptor blockers, like losartan or valsartan  · certain medicines for diabetes  · diuretics  · everolimus  · gold compounds  · lithium  · NSAIDs, medicines for pain and inflammation, like ibuprofen or naproxen  · potassium salts or supplements  · salt substitutes  · sirolimus  · temsirolimus  This list may not describe all possible interactions. Give your health care provider a list of all the medicines, herbs,  non-prescription drugs, or dietary supplements you use. Also tell them if you smoke, drink alcohol, or use illegal drugs. Some items may interact with your medicine.  What should I watch for while using this medicine?  Visit your doctor or health care professional for regular check ups. Check your blood pressure as directed. Ask your doctor what your blood pressure should be, and when you should contact him or her.  Do not treat yourself for coughs, colds, or pain while you are using this medicine without asking your doctor or health care professional for advice. Some ingredients may increase your blood pressure.  Women should inform their doctor if they wish to become pregnant or think they might be pregnant. There is a potential for serious side effects to an unborn child. Talk to your health care professional or pharmacist for more information.  Check with your doctor or health care professional if you get an attack of severe diarrhea, nausea and vomiting, or if you sweat a lot. The loss of too much body fluid can make it dangerous for you to take this medicine.  You may get drowsy or dizzy. Do not drive, use machinery, or do anything that needs mental alertness until you know how this drug affects you. Do not stand or sit up quickly, especially if you are an older patient. This reduces the risk of dizzy or fainting spells. Alcohol can make you more drowsy and dizzy. Avoid alcoholic drinks.  Avoid salt substitutes unless you are told otherwise by your doctor or health care professional.  What side effects may I notice from receiving this medicine?  Side effects that you should report to your doctor or health care professional as soon as possible:  · allergic reactions like skin rash, itching or hives, swelling of the hands, feet, face, lips, throat, or tongue  · breathing problems  · signs and symptoms of kidney injury like trouble passing urine or change in the amount of urine  · signs and symptoms of increased  potassium like muscle weakness; chest pain; or fast, irregular heartbeat  · signs and symptoms of liver injury like dark yellow or brown urine; general ill feeling or flu-like symptoms; light-colored stools; loss of appetite; nausea; right upper belly pain; unusually weak or tired; yellowing of the eyes or skin  · signs and symptoms of low blood pressure like dizziness; feeling faint or lightheaded, falls; unusually weak or tired  · stomach pain with or without nausea and vomiting  Side effects that usually do not require medical attention (report to your doctor or health care professional if they continue or are bothersome):  · changes in taste  · cough  · dizziness  · fever  · headache  · sensitivity to light  This list may not describe all possible side effects. Call your doctor for medical advice about side effects. You may report side effects to FDA at 9-506-FDA-2753.  Where should I keep my medicine?  Keep out of the reach of children.  Store at room temperature between 15 and 30 degrees C (59 and 86 degrees F). Protect from moisture. Keep container tightly closed. Throw away any unused medicine after the expiration date.  NOTE: This sheet is a summary. It may not cover all possible information. If you have questions about this medicine, talk to your doctor, pharmacist, or health care provider.  © 2020 [employer] Standard (2017-02-06 12:52:35)    Nitroglycerin sublingual tablets  What is this medicine?  NITROGLYCERIN (bryan troe GLI ser in) is a type of vasodilator. It relaxes blood vessels, increasing the blood and oxygen supply to your heart. This medicine is used to relieve chest pain caused by angina. It is also used to prevent chest pain before activities like climbing stairs, going outdoors in cold weather, or sexual activity.  This medicine may be used for other purposes; ask your health care provider or pharmacist if you have questions.  COMMON BRAND NAME(S): Nitroquick, Nitrostat, Nitrotab  What  should I tell my health care provider before I take this medicine?  They need to know if you have any of these conditions:  · anemia  · head injury, recent stroke, or bleeding in the brain  · liver disease  · previous heart attack  · an unusual or allergic reaction to nitroglycerin, other medicines, foods, dyes, or preservatives  · pregnant or trying to get pregnant  · breast-feeding  How should I use this medicine?  Take this medicine by mouth as needed. At the first sign of an angina attack (chest pain or tightness) place one tablet under your tongue. You can also take this medicine 5 to 10 minutes before an event likely to produce chest pain. Follow the directions on the prescription label. Let the tablet dissolve under the tongue. Do not swallow whole. Replace the dose if you accidentally swallow it. It will help if your mouth is not dry. Saliva around the tablet will help it to dissolve more quickly. Do not eat or drink, smoke or chew tobacco while a tablet is dissolving. If you are not better within 5 minutes after taking ONE dose of nitroglycerin, call 9-1- immediately to seek emergency medical care. Do not take more than 3 nitroglycerin tablets over 15 minutes.  If you take this medicine often to relieve symptoms of angina, your doctor or health care professional may provide you with different instructions to manage your symptoms. If symptoms do not go away after following these instructions, it is important to call 9-1-1 immediately. Do not take more than 3 nitroglycerin tablets over 15 minutes.  Talk to your pediatrician regarding the use of this medicine in children. Special care may be needed.  Overdosage: If you think you have taken too much of this medicine contact a poison control center or emergency room at once.  NOTE: This medicine is only for you. Do not share this medicine with others.  What if I miss a dose?  This does not apply. This medicine is only used as needed.  What may interact with  this medicine?  Do not take this medicine with any of the following medications:  · certain migraine medicines like ergotamine and dihydroergotamine (DHE)  · medicines used to treat erectile dysfunction like sildenafil, tadalafil, and vardenafil  · riociguat  This medicine may also interact with the following medications:  · alteplase  · aspirin  · heparin  · medicines for high blood pressure  · medicines for mental depression  · other medicines used to treat angina  · phenothiazines like chlorpromazine, mesoridazine, prochlorperazine, thioridazine  This list may not describe all possible interactions. Give your health care provider a list of all the medicines, herbs, non-prescription drugs, or dietary supplements you use. Also tell them if you smoke, drink alcohol, or use illegal drugs. Some items may interact with your medicine.  What should I watch for while using this medicine?  Tell your doctor or health care professional if you feel your medicine is no longer working.  Keep this medicine with you at all times. Sit or lie down when you take your medicine to prevent falling if you feel dizzy or faint after using it. Try to remain calm. This will help you to feel better faster. If you feel dizzy, take several deep breaths and lie down with your feet propped up, or bend forward with your head resting between your knees.  You may get drowsy or dizzy. Do not drive, use machinery, or do anything that needs mental alertness until you know how this drug affects you. Do not stand or sit up quickly, especially if you are an older patient. This reduces the risk of dizzy or fainting spells. Alcohol can make you more drowsy and dizzy. Avoid alcoholic drinks.  Do not treat yourself for coughs, colds, or pain while you are taking this medicine without asking your doctor or health care professional for advice. Some ingredients may increase your blood pressure.  What side effects may I notice from receiving this medicine?  Side  effects that you should report to your doctor or health care professional as soon as possible:  · blurred vision  · dry mouth  · skin rash  · sweating  · the feeling of extreme pressure in the head  · unusually weak or tired  Side effects that usually do not require medical attention (report to your doctor or health care professional if they continue or are bothersome):  · flushing of the face or neck  · headache  · irregular heartbeat, palpitations  · nausea, vomiting  This list may not describe all possible side effects. Call your doctor for medical advice about side effects. You may report side effects to FDA at 6-787-OHA-8351.  Where should I keep my medicine?  Keep out of the reach of children.  Store at room temperature between 20 and 25 degrees C (68 and 77 degrees F). Store in original container. Protect from light and moisture. Keep tightly closed. Throw away any unused medicine after the expiration date.  NOTE: This sheet is a summary. It may not cover all possible information. If you have questions about this medicine, talk to your doctor, pharmacist, or health care provider.  © 2020 [employer]/Gold Standard (2014-10-16 17:57:36)    Prasugrel oral tablets  What is this medicine?  PRASUGREL (PRA lindsey grel) helps to prevent blood clots. This medicine is used to prevent heart attack, stroke, or other vascular events in people who are at high risk.  This medicine may be used for other purposes; ask your health care provider or pharmacist if you have questions.  COMMON BRAND NAME(S): Effient  What should I tell my health care provider before I take this medicine?  They need to know if you have any of these conditions:  · bleeding disorders  · kidney disease  · liver disease  · recent trauma or surgery  · stomach or intestinal ulcers  · stroke or transient ischemic attack  · an unusual or allergic reaction to prasugrel, other medicines, foods, dyes, or preservatives  · pregnant or trying to get  pregnant  · breast-feeding  How should I use this medicine?  Take this medicine by mouth with a drink of water. Follow the directions on the prescription label. You may take this medicine with or without food. If it upsets your stomach, take it with food. Do not crush, cut, or chew the tablet. If you are not able to take the tablet whole, talk to your prescriber about other ways to take this medicine. Take your medicine at regular intervals. Do not take your medicine more often than directed. Do not stop taking except on your doctor's advice.  Talk to your pediatrician regarding the use of this medicine in children. Special care may be needed.  Overdosage: If you think you have taken too much of this medicine contact a poison control center or emergency room at once.  NOTE: This medicine is only for you. Do not share this medicine with others.  What if I miss a dose?  If you miss a dose, take it as soon as you can. If it is almost time for your next dose, take only that dose. Do not take double or extra doses.  What may interact with this medicine?  Do not take this medicine with any of the following medications:  · defibrotide  This medicine may also interact with the following medications:  · certain medicines that treat or prevent blood clots like warfarin  · narcotic medicines for pain  · NSAIDS, medicines for pain and inflammation, like ibuprofen or naproxen  This list may not describe all possible interactions. Give your health care provider a list of all the medicines, herbs, non-prescription drugs, or dietary supplements you use. Also tell them if you smoke, drink alcohol, or use illegal drugs. Some items may interact with your medicine.  What should I watch for while using this medicine?  Visit your doctor or health care professional for regular check ups. Do not stop taking your medicine unless your doctor tells you to.  Notify your doctor or health care professional and seek emergency treatment if you  develop breathing problems; changes in vision; chest pain; severe, sudden headache; pain, swelling, warmth in the leg; trouble speaking; sudden numbness or weakness of the face, arm, or leg. These can be signs that your condition has gotten worse.  If you are going to have surgery or dental work, tell your doctor or health care professional that you are taking this medicine.  What side effects may I notice from receiving this medicine?  Side effects that you should report to your doctor or health care professional as soon as possible:  · allergic reactions like skin rash, itching or hives, swelling of the face, lips, or tongue  · signs and symptoms of bleeding such as bloody or black, tarry stools; red or dark-brown urine; spitting up blood or brown material that looks like coffee grounds; red spots on the skin; unusual bruising or bleeding from the eye, gums, or nose  Side effects that usually do not require medical attention (report to your doctor or health care professional if they continue or are bothersome):  · diarrhea  · headache  · nausea, vomiting  · pain in back, arms or legs  This list may not describe all possible side effects. Call your doctor for medical advice about side effects. You may report side effects to FDA at 4-829-FDA-9094.  Where should I keep my medicine?  Keep out of the reach of children.  Store at room temperature between 15 and 30 degrees C (59 and 86 degrees F). Keep this medicine in the original container. Keep container closed and do not remove the gray cylinder from the bottle. Throw away any unused medicine after the expiration date.  NOTE: This sheet is a summary. It may not cover all possible information. If you have questions about this medicine, talk to your doctor, pharmacist, or health care provider.  © 2020 [employer]/Gold Standard (2019-04-08 17:07:32)      Depression / Suicide Risk    As you are discharged from this [facility], it is important to learn how to  keep safe from harming yourself.    Recognize the warning signs:  · Abrupt changes in personality, positive or negative- including increase in energy   · Giving away possessions  · Change in eating patterns- significant weight changes-  positive or negative  · Change in sleeping patterns- unable to sleep or sleeping all the time   · Unwillingness or inability to communicate  · Depression  · Unusual sadness, discouragement and loneliness  · Talk of wanting to die  · Neglect of personal appearance   · Rebelliousness- reckless behavior  · Withdrawal from people/activities they love  · Confusion- inability to concentrate     If you or a loved one observes any of these behaviors or has concerns about self-harm, here's what you can do:  · Talk about it- your feelings and reasons for harming yourself  · Remove any means that you might use to hurt yourself (examples: pills, rope, extension cords, firearm)  · Get professional help from the community (Mental Health, Substance Abuse, psychological counseling)  · Do not be alone:Call your Safe Contact- someone whom you trust who will be there for you.  · Call your local CRISIS HOTLINE 280-3784 or 497-561-8502  · Call your local Children's Mobile Crisis Response Team [place] (676) 409-6579 or www.[employer]  · Call the toll free National Suicide Prevention Hotlines   · National Suicide Prevention Lifeline 526-602-BDLP (6368)  · National Hope Line Network 800-SUICIDE (276-5342)        Heart Attack  A heart attack occurs when blood and oxygen supply to the heart is cut off. A heart attack causes damage to the heart that cannot be fixed. A heart attack is also called a myocardial infarction, or MI. If you think you are having a heart attack, do not wait to see if the symptoms will go away. Get medical help right away.  What are the causes?  This condition may be caused by:  · A fatty substance (plaque) in the blood vessels (arteries). This can block the flow of blood to  the heart.  · A blood clot in the blood vessels that go to the heart. The blood clot blocks blood flow.  · Low blood pressure.  · An abnormal heartbeat.  · Some diseases, such as problems in red blood cells (anemia)orproblems in breathing (respiratory failure).  · Tightening (spasm) of a blood vessel that cuts off blood to the heart.  · A tear in a blood vessel of the heart.  · High blood pressure.  What increases the risk?  The following factors may make you more likely to develop this condition:  · Aging. The older you are, the higher your risk.  · Having a personal or family history of chest pain, heart attack, stroke, or narrowing of the arteries in the legs, arms, head, or stomach (peripheral artery disease).  · Being male.  · Smoking.  · Not getting regular exercise.  · Being overweight or obese.  · Having high blood pressure.  · Having high cholesterol.  · Having diabetes.  · Drinking too much alcohol.  · Using illegal drugs, such as cocaine or methamphetamine.  What are the signs or symptoms?  Symptoms of this condition include:  · Chest pain. It may feel like:  ? Crushing or squeezing.  ? Tightness, pressure, fullness, or heaviness.  · Pain in the arm, neck, jaw, back, or upper body.  · Shortness of breath.  · Heartburn.  · Upset stomach (indigestion).  · Feeling like you may vomit (nauseous).  · Cold sweats.  · Feeling tired.  · Sudden light-headedness.  How is this treated?  A heart attack must be treated as soon as possible. Treatment may include:  · Medicines to:  ? Break up or dissolve blood clots.  ? Thin blood and help prevent blood clots.  ? Treat blood pressure.  ? Improve blood flow to the heart.  ? Reduce pain.  ? Reduce cholesterol.  · Procedures to widen a blocked artery and keep it open.  · Open heart surgery.  · Receiving oxygen.  · Making your heart strong again (cardiac rehabilitation) through exercise, education, and counseling.  Follow these instructions at home:  Medicines  · Take  over-the-counter and prescription medicines only as told by your doctor. You may need to take medicine:  ? To keep your blood from clotting too easily.  ? To control blood pressure.  ? To lower cholesterol.  ? To control heart rhythms.  · Do not take these medicines unless your doctor says it is okay:  ? NSAIDs, such as ibuprofen.  ? Supplements that have vitamin A, vitamin E, or both.  ? Hormone replacement therapy that has estrogen with or without progestin.  Lifestyle         · Do not use any products that have nicotine or tobacco, such as cigarettes, e-cigarettes, and chewing tobacco. If you need help quitting, ask your doctor.  · Avoid secondhand smoke.  · Exercise regularly. Ask your doctor about a cardiac rehab program.  · Eat heart-healthy foods. Your doctor will tell you what foods to eat.  · Stay at a healthy weight.  · Lower your stress level.  · Do not use illegal drugs.  Alcohol use  · Do not drink alcohol if:  ? Your doctor tells you not to drink.  ? You are pregnant, may be pregnant, or are planning to become pregnant.  · If you drink alcohol:  ? Limit how much you use to:  § 0-1 drink a day for women.  § 0-2 drinks a day for men.  ? Know how much alcohol is in your drink. In the [place] one drink equals one 12 oz bottle of beer (355 mL), one 5 oz glass of wine (148 mL), or one 1½ oz glass of hard liquor (44 mL).  General instructions  · Work with your doctor to treat other problems you may have, such as diabetes or high blood pressure.  · Get screened for depression. Get treatment if needed.  · Keep your vaccines up to date. Get the flu shot (influenza vaccine) every year.  · Keep all follow-up visits as told by your doctor. This is important.  Contact a doctor if:  · You feel very sad.  · You have trouble doing your daily activities.  Get help right away if:  · You have sudden, unexplained discomfort in your chest, arms, back, neck, jaw, or upper body.  · You have shortness of breath.  · You have  sudden sweating or clammy skin.  · You feel like you may vomit.  · You vomit.  · You feel tired or weak.  · You get light-headed or dizzy.  · You feel your heart beating fast.  · You feel your heart skipping beats.  · You have blood pressure that is higher than 180/120.  These symptoms may be an emergency. Do not wait to see if the symptoms will go away. Get medical help right away. Call your local emergency services (911 in the [place] Do not drive yourself to the hospital.  Summary  · A heart attack occurs when blood and oxygen supply to the heart is cut off.  · Do not take NSAIDs unless your doctor says it is okay.  · Do not smoke. Avoid secondhand smoke.  · Exercise regularly. Ask your doctor about a cardiac rehab program.  This information is not intended to replace advice given to you by your health care provider. Make sure you discuss any questions you have with your health care provider.  Document Released: 06/18/2013 Document Revised: 03/30/2020 Document Reviewed: 03/30/2020  Elsevier Patient Education © 2020 [employer]

## 2021-07-27 NOTE — CARE PLAN
The patient is Stable - Low risk of patient condition declining or worsening    Shift Goals  Clinical Goals: Cath lab  Patient Goals: Rest    Progress made toward(s) clinical / shift goals:    Problem: Knowledge Deficit - Standard  Goal: Patient and family/care givers will demonstrate understanding of plan of care, disease process/condition, diagnostic tests and medications  Outcome: Progressing     Problem: Pain - Standard  Goal: Alleviation of pain or a reduction in pain to the patient’s comfort goal  Outcome: Progressing

## 2021-07-27 NOTE — DISCHARGE SUMMARY
Discharge Summary    Date of Admission: 7/25/2021  Date of Discharge: 7/27/2021  Discharging Attending: Wilfred Bahena M.D.   Discharging Senior Resident: Dr. Callejas  Discharging Intern: Dr. Lyn    CHIEF COMPLAINT ON ADMISSION  No chief complaint on file.      Reason for Admission  NSTEMI    Admission Date  7/25/2021    Discharge Date  7/27/2021    CODE STATUS  Full Code    HPI & HOSPITAL COURSE  Patient is a 70-year-old male with no significant past medical history who presented to hospital for further evaluation of cute onset of chest pain while sleeping.  Patient denied any personal or family history heart disease.    NSTEMI  Drug eluding stent placement  On initial evaluation the patient was found to have increasing troponin levels on rechecks last known troponin of 1676 without any noticeable ST elevations on EKG.  Cardiology was consulted for further evaluation who recommended patient undergoing a coronary catheterization which the patient underwent on hospital day 2.  Required stenting to obtuse marginal artery 1 during cardiac catheterization.  Patient discharged with lisinopril, [place] atorvastatin, carvedilol for beta blockade, and prasugrel with aspirin for 1 year of DAPT therapy.  Follow-up with cardiology for further management.    Enlarged ascending aorta  Found to have enlarged aorta measuring 4.1 cm.  Patient to follow-up with primary care physician for 1 year with repeat echocardiogram for further evaluation.    Sinus bradycardia  Patient found to have sinus bradycardia with heart rate ranging from 45-55 during hospitalization. Patient was initially started on carvedilol 6.25 mg twice daily, which was reduced to 3.125 mg twice daily.  Follow-up with cardiology and primary care physician for further titration of medications after discharge.    Hyperlipidemia  Found to have a LDL of 98 and started on high intensity statin after discharge.  Discharged with atorvastatin 80 mg  daily.    Therefore, he is discharged in guarded and stable condition to home with close outpatient follow-up.    The patient met 2-midnight criteria for an inpatient stay at the time of discharge.    Physical Exam  Constitutional:       General: He is not in acute distress.     Appearance: Normal appearance.   HENT:      Head: Normocephalic and atraumatic.      Mouth/Throat:      Pharynx: Oropharynx is clear.   Eyes:      Extraocular Movements: Extraocular movements intact.      Pupils: Pupils are equal, round, and reactive to light.   Neck:      Vascular: No carotid bruit.   Cardiovascular:      Rate and Rhythm: Regular rhythm. Bradycardia present.      Pulses: Normal pulses.      Heart sounds: Normal heart sounds.   Pulmonary:      Effort: Pulmonary effort is normal. No respiratory distress.      Breath sounds: Normal breath sounds.   Abdominal:      General: Bowel sounds are normal. There is no distension.      Palpations: Abdomen is soft.      Tenderness: There is no abdominal tenderness.   Musculoskeletal:         General: No swelling.      Right lower leg: No edema.      Left lower leg: No edema.   Skin:     General: Skin is warm and dry.   Neurological:      General: No focal deficit present.      Mental Status: He is alert and oriented to person, place, and time. Mental status is at baseline.         FOLLOW UP ITEMS POST DISCHARGE  Follow-up with cardiology as an outpatient.  With primary care physician as an outpatient.    DISCHARGE DIAGNOSES  Principal Problem:    NSTEMI (non-ST elevated myocardial infarction) (HCC) POA: Unknown  Resolved Problems:    * No resolved hospital problems. *      FOLLOW UP  Future Appointments   Date Time Provider Department Center   8/10/2021 10:00 AM ADELSO Arenas [facility] None     No follow-up provider specified.    MEDICATIONS ON DISCHARGE     Medication List      START taking these medications      Instructions   prasugrel 10 MG Tabs  Start taking on: July 28,  2021  Commonly known as: EFFIENT   Take 1 tablet by mouth every day for 30 days.  Dose: 10 mg        ASK your doctor about these medications      Instructions   B-12 PO   Take 1 tablet by mouth every day.  Dose: 1 tablet     D3 PO   Take 1 capsule by mouth every day.  Dose: 1 capsule     sildenafil 20 MG tablet  Commonly known as: REVATIO   Take 20 mg by mouth as needed (For erectile dysfunction). For erectile dysfunction  Dose: 20 mg     VITAMIN C PO   Take 1 tablet by mouth every day.  Dose: 1 tablet            Allergies  No Known Allergies    DIET  Orders Placed This Encounter   Procedures   • Diet Order Diet: Cardiac     Standing Status:   Standing     Number of Occurrences:   1     Order Specific Question:   Diet:     Answer:   Cardiac [6]       ACTIVITY  As tolerated.  Weight bearing as tolerated    CONSULTATIONS  Dr. Narayanan with Cardiology consulted.  Treatment options were discussed and plan of care agreed upon.    PROCEDURES  Cardiac catheterization with placement of drug-eluting stent    Total of 45 minutes was spent on this discharge.

## 2021-07-27 NOTE — PROGRESS NOTES
Assumed care of pt, received bedside report from ALEXSANDRA Kingsley. Pt sitting up in bed, no complaints of pain, room air, A/Ox4. Fall and safety precautions in place. Discussed POC with pt, pt verbalizes understanding. No further needs at this time.     MD at bedside, pt to walk about and then clear to discharge if no chest pain noted.

## 2021-07-27 NOTE — DISCHARGE PLANNING
LSW asked to check  Price of patient's effient. Price is $18.78. Med is in stock and will be ready for  once patient is discharged. LSW informed  via voalte.

## 2021-08-10 ENCOUNTER — OFFICE VISIT (OUTPATIENT)
Dept: CARDIOLOGY | Facility: MEDICAL CENTER | Age: 71
End: 2021-08-10
Payer: MEDICARE

## 2021-08-10 VITALS
WEIGHT: 162.2 LBS | RESPIRATION RATE: 16 BRPM | SYSTOLIC BLOOD PRESSURE: 110 MMHG | DIASTOLIC BLOOD PRESSURE: 62 MMHG | BODY MASS INDEX: 24.02 KG/M2 | HEART RATE: 41 BPM | HEIGHT: 69 IN | OXYGEN SATURATION: 98 %

## 2021-08-10 DIAGNOSIS — I10 ESSENTIAL HYPERTENSION, BENIGN: ICD-10-CM

## 2021-08-10 DIAGNOSIS — I25.10 CORONARY ARTERY DISEASE INVOLVING NATIVE CORONARY ARTERY OF NATIVE HEART WITHOUT ANGINA PECTORIS: ICD-10-CM

## 2021-08-10 DIAGNOSIS — I77.810 ASCENDING AORTA DILATION (HCC): ICD-10-CM

## 2021-08-10 PROCEDURE — 99213 OFFICE O/P EST LOW 20 MIN: CPT | Performed by: NURSE PRACTITIONER

## 2021-08-10 RX ORDER — LISINOPRIL 5 MG/1
5 TABLET ORAL DAILY
Qty: 90 TABLET | Refills: 3 | Status: SHIPPED
Start: 2021-08-10 | End: 2021-12-07

## 2021-08-10 RX ORDER — ATORVASTATIN CALCIUM 80 MG/1
80 TABLET, FILM COATED ORAL EVERY EVENING
Qty: 90 TABLET | Refills: 3 | Status: SHIPPED | OUTPATIENT
Start: 2021-08-10 | End: 2022-08-23

## 2021-08-10 RX ORDER — ATORVASTATIN CALCIUM 80 MG/1
80 TABLET, FILM COATED ORAL
COMMUNITY
Start: 2021-07-27 | End: 2021-08-10

## 2021-08-10 RX ORDER — PRASUGREL 10 MG/1
10 TABLET, FILM COATED ORAL DAILY
Qty: 90 TABLET | Refills: 3 | Status: SHIPPED | OUTPATIENT
Start: 2021-08-10 | End: 2022-08-23

## 2021-08-10 ASSESSMENT — ENCOUNTER SYMPTOMS
HEMOPTYSIS: 0
COUGH: 0
CLAUDICATION: 0
NAUSEA: 0
SHORTNESS OF BREATH: 0
PND: 0
WHEEZING: 0
VOMITING: 0
ORTHOPNEA: 0
PALPITATIONS: 0
SPUTUM PRODUCTION: 0
WEAKNESS: 0
DIZZINESS: 0

## 2021-08-10 ASSESSMENT — FIBROSIS 4 INDEX: FIB4 SCORE: 10.86

## 2021-08-10 NOTE — LETTER
[facility]   [address]  [place] [address]  Phone: [phone]  Fax: [phone]              Vasile Bowers  1950    Encounter Date: 8/10/2021    ADELSO Arenas          PROGRESS NOTE:  Chief Complaint   Patient presents with   • Chest Pain     F/V Dx: Other chest pain   • MI (Non ST Segment Elevation MI)     F/V Dx: NSTEMI (non-ST elevated myocardial infarction) (HCC)       Subjective:   Vasile Bowers is a 70 y.o. male who presents today for hospital follow up NSTEMI.     Patient was hospitalized for chest pain with pain radiating to his shoulders and neck. Transferred from [facility] for NSTEMI. Trop T peaked at 1676. Underwent coronary angiogram showed 98% stenosis on the OM1. Underwent PCI to OM1. ECHO showed ef 55%.     Patient is doing well. No cardiovascular complaints. Notices some fatigue while driving.      Past Medical History:   Diagnosis Date   • Ascending aorta dilation (HCC)    • CAD (coronary artery disease)      Past Surgical History:   Procedure Laterality Date   • ZZZ CARDIAC CATH  07/2021    PCI to OM1    • KNEE ARTHROSCOPY     • SHOULDER ARTHROSCOPY       Family History   Problem Relation Age of Onset   • Heart Disease Mother    • Heart Failure Mother      Social History     Socioeconomic History   • Marital status:      Spouse name: Not on file   • Number of children: Not on file   • Years of education: Not on file   • Highest education level: Not on file   Occupational History   • Not on file   Tobacco Use   • Smoking status: Former Smoker   • Smokeless tobacco: Never Used   Substance and Sexual Activity   • Alcohol use: Not Currently   • Drug use: No   • Sexual activity: Not on file   Other Topics Concern   • Not on file   Social History Narrative   • Not on file     Social Determinants of Health     Financial Resource Strain:    • Difficulty of Paying Living Expenses:    Food Insecurity:       • Worried About Running Out of Food in the Last Year:    • Ran Out of Food in the Last Year:    Transportation Needs:    • Lack of Transportation (Medical):    • Lack of Transportation (Non-Medical):    Physical Activity:    • Days of Exercise per Week:    • Minutes of Exercise per Session:    Stress:    • Feeling of Stress :    Social Connections:    • Frequency of Communication with Friends and Family:    • Frequency of Social Gatherings with Friends and Family:    • Attends Anabaptism Services:    • Active Member of Clubs or Organizations:    • Attends Club or Organization Meetings:    • Marital Status:    Intimate Partner Violence:    • Fear of Current or Ex-Partner:    • Emotionally Abused:    • Physically Abused:    • Sexually Abused:      No Known Allergies  Outpatient Encounter Medications as of 8/10/2021   Medication Sig Dispense Refill   • atorvastatin (LIPITOR) 80 MG tablet Take 1 tablet by mouth every evening. 90 tablet 3   • lisinopril (PRINIVIL) 5 MG Tab Take 1 tablet by mouth every day. 90 tablet 3   • prasugrel (EFFIENT) 10 MG Tab Take 1 tablet by mouth every day. 90 tablet 3   • aspirin EC 81 MG EC tablet Take 1 tablet by mouth every day. 30 tablet 2   • Ascorbic Acid (VITAMIN C PO) Take 1 tablet by mouth every day.     • Cholecalciferol (D3 PO) Take 1 capsule by mouth every day.     • Cyanocobalamin (B-12 PO) Take 1 tablet by mouth every day.     • [DISCONTINUED] atorvastatin (LIPITOR) 80 MG tablet Take 80 mg by mouth.     • [DISCONTINUED] atorvastatin (LIPITOR) 80 MG tablet Take 1 tablet by mouth every evening. 30 tablet 2   • [DISCONTINUED] carvedilol (COREG) 3.125 MG Tab Take 1 tablet by mouth 2 times a day with meals. 60 tablet 2   • [DISCONTINUED] nitroglycerin (NITROSTAT) 0.4 MG SL Tab Place 1 tablet under the tongue as needed for Chest Pain (up to 3 doses (if SBP greater than 90 mmHg)). (Patient not taking: Reported on 8/10/2021) 25 tablet 0   • [DISCONTINUED] lisinopril (PRINIVIL) 5 MG Tab  "Take 1 tablet by mouth every day. 30 tablet 2   • [DISCONTINUED] prasugrel (EFFIENT) 10 MG Tab Take 1 tablet by mouth every day. 30 tablet 2   • [DISCONTINUED] sildenafil (REVATIO) 20 MG tablet Take 20 mg by mouth as needed (For erectile dysfunction). For erectile dysfunction (Patient not taking: Reported on 8/10/2021)       No facility-administered encounter medications on file as of 8/10/2021.     Review of Systems   Constitutional: Negative for malaise/fatigue.   Respiratory: Negative for cough, hemoptysis, sputum production, shortness of breath and wheezing.    Cardiovascular: Negative for chest pain, palpitations, orthopnea, claudication, leg swelling and PND.   Gastrointestinal: Negative for nausea and vomiting.   Neurological: Negative for dizziness and weakness.        Objective:   /62 (BP Location: Left arm, Patient Position: Sitting, BP Cuff Size: Adult)   Pulse (!) 41   Resp 16   Ht 1.753 m (5' 9\")   Wt 73.6 kg (162 lb 3.2 oz)   SpO2 98%   BMI 23.95 kg/m²     Physical Exam      Echocardiogram:  7/26/2021  Left Ventricle  Normal left ventricular chamber size. Normal left ventricular wall   thickness. Normal left ventricular systolic function. Left ventricular   ejection fraction is visually estimated to be 55%. Normal regional wall   motion. Normal diastolic function.     Right Ventricle  The right ventricle was normal in size and function.     Right Atrium  The right atrium is normal in size. Normal inferior vena cava size   without inspiratory collapse.     Left Atrium  The left atrium is normal in size. Left atrial volume index is 17 mL/sq   m.     Mitral Valve  Structurally normal mitral valve. No mitral stenosis. Trivial mitral   regurgitation.     Aortic Valve  Structurally normal aortic valve without significant stenosis or   regurgitation.     Tricuspid Valve  Structurally normal tricuspid valve without significant stenosis or   regurgitation.  Unable to estimate pulmonary artery " pressure due to an   inadequate tricuspid regurgitant jet.     Pulmonic Valve  No pulmonic stenosis. Mild pulmonic insufficiency.     Pericardium  Normal pericardium without effusion.     Aorta  The aortic root is normal. Ascending aorta is dilated with a diameter   of 4.1 cm.     Cardiac Catheterization:   7/26/2021  POSTOPERATIVE DIAGNOSIS:  1. 98% focal culprit OM 1 stenosis  2. Single-vessel coronary disease  3. LVEF 65%, LVEDP 10 mmHg  4. Successful PCI culprit OM1 (2.25 x 15 mm [place] FERNANDA), excellent result  Left main coronary artery: Large caliber bifurcating no CAD.  Left anterior descending artery: Large caliber usual complement diagonals no CAD.  Left circumflex coronary artery: Moderate caliber nondominant supplying a long moderate caliber OM1 with a 98% focal midportion stenosis.  Postintervention 0% residual stenosis in the stented segment and GUICHO-3 flow.  Right coronary artery: Large caliber vessel no significant epicardial CAD.     III.  LEFT VENTRICULOGRAM:  LVEF GONZALEZ PROJECTION: 60%        Imaging  CTA  7/25/2021  1.  Mild aneurysmal dilatation of the ascending aorta measured at 4.2 x 4.1 cm.     2.  No evidence of aortic dissection.     3.  Fatty change of the liver.  Assessment:     1. Coronary artery disease involving native coronary artery of native heart without angina pectoris     2. Ascending aorta dilation (HCC)     3. Essential hypertension, benign         Medical Decision Making:  Today's Assessment / Status / Plan:     1. CAD s/p PCI to OM1  -  Pt recovering well post stent placement.  He has not had any recurrence of chest pain or angina.  - He has been compliant with his medications without missed doses.  I discussed mechanism of action and importance of cardiac medications, especially DAPT.    - We reviewed heart healthy, low sodium low cholesterol diet today.  We also reviewed recommended activity guidelines per AHA guidelines.  - due to bradycardia HR down to 38 and fatigue, we will  stop the coreg. He will monitor his blood pressure at home.   - continue to take asa, effient 10mg qd, atorvastatin 80mg qd  - ECHO post PCI ef 55%    2. Hypertension  - stable   - continue lisinopril 5mg qd     3. Ascending aorta dilation  - currently 4.1-4.3  - monitor daily     Follow up in 6 months, sooner as needed.           Diamond Clayton M.D.  [address] [address]  Via Fax: [phone]

## 2021-08-10 NOTE — PROGRESS NOTES
Chief Complaint   Patient presents with   • Chest Pain     F/V Dx: Other chest pain   • MI (Non ST Segment Elevation MI)     F/V Dx: NSTEMI (non-ST elevated myocardial infarction) ([facility])       Subjective:   Vasile Bowers is a 70 y.o. male who presents today for hospital follow up NSTEMI.     Patient was hospitalized for chest pain with pain radiating to his shoulders and neck. Transferred from [facility] for NSTEMI. Trop T peaked at 1676. Underwent coronary angiogram showed 98% stenosis on the OM1. Underwent PCI to OM1. ECHO showed ef 55%.     Patient is doing well. No cardiovascular complaints. Notices some fatigue while driving.      Past Medical History:   Diagnosis Date   • Ascending aorta dilation (HCC)    • CAD (coronary artery disease)      Past Surgical History:   Procedure Laterality Date   • ZZZ CARDIAC CATH  07/2021    PCI to OM1    • KNEE ARTHROSCOPY     • SHOULDER ARTHROSCOPY       Family History   Problem Relation Age of Onset   • Heart Disease Mother    • Heart Failure Mother      Social History     Socioeconomic History   • Marital status:      Spouse name: Not on file   • Number of children: Not on file   • Years of education: Not on file   • Highest education level: Not on file   Occupational History   • Not on file   Tobacco Use   • Smoking status: Former Smoker   • Smokeless tobacco: Never Used   Substance and Sexual Activity   • Alcohol use: Not Currently   • Drug use: No   • Sexual activity: Not on file   Other Topics Concern   • Not on file   Social History Narrative   • Not on file     Social Determinants of Health     Financial Resource Strain:    • Difficulty of Paying Living Expenses:    Food Insecurity:    • Worried About Running Out of Food in the Last Year:    • Ran Out of Food in the Last Year:    Transportation Needs:    • Lack of Transportation (Medical):    • Lack of Transportation (Non-Medical):    Physical Activity:    • Days of Exercise per Week:    • Minutes of  Exercise per Session:    Stress:    • Feeling of Stress :    Social Connections:    • Frequency of Communication with Friends and Family:    • Frequency of Social Gatherings with Friends and Family:    • Attends Jew Services:    • Active Member of Clubs or Organizations:    • Attends Club or Organization Meetings:    • Marital Status:    Intimate Partner Violence:    • Fear of Current or Ex-Partner:    • Emotionally Abused:    • Physically Abused:    • Sexually Abused:      No Known Allergies  Outpatient Encounter Medications as of 8/10/2021   Medication Sig Dispense Refill   • atorvastatin (LIPITOR) 80 MG tablet Take 1 tablet by mouth every evening. 90 tablet 3   • lisinopril (PRINIVIL) 5 MG Tab Take 1 tablet by mouth every day. 90 tablet 3   • prasugrel (EFFIENT) 10 MG Tab Take 1 tablet by mouth every day. 90 tablet 3   • aspirin EC 81 MG EC tablet Take 1 tablet by mouth every day. 30 tablet 2   • Ascorbic Acid (VITAMIN C PO) Take 1 tablet by mouth every day.     • Cholecalciferol (D3 PO) Take 1 capsule by mouth every day.     • Cyanocobalamin (B-12 PO) Take 1 tablet by mouth every day.     • [DISCONTINUED] atorvastatin (LIPITOR) 80 MG tablet Take 80 mg by mouth.     • [DISCONTINUED] atorvastatin (LIPITOR) 80 MG tablet Take 1 tablet by mouth every evening. 30 tablet 2   • [DISCONTINUED] carvedilol (COREG) 3.125 MG Tab Take 1 tablet by mouth 2 times a day with meals. 60 tablet 2   • [DISCONTINUED] nitroglycerin (NITROSTAT) 0.4 MG SL Tab Place 1 tablet under the tongue as needed for Chest Pain (up to 3 doses (if SBP greater than 90 mmHg)). (Patient not taking: Reported on 8/10/2021) 25 tablet 0   • [DISCONTINUED] lisinopril (PRINIVIL) 5 MG Tab Take 1 tablet by mouth every day. 30 tablet 2   • [DISCONTINUED] prasugrel (EFFIENT) 10 MG Tab Take 1 tablet by mouth every day. 30 tablet 2   • [DISCONTINUED] sildenafil (REVATIO) 20 MG tablet Take 20 mg by mouth as needed (For erectile dysfunction). For erectile  "dysfunction (Patient not taking: Reported on 8/10/2021)       No facility-administered encounter medications on file as of 8/10/2021.     Review of Systems   Constitutional: Negative for malaise/fatigue.   Respiratory: Negative for cough, hemoptysis, sputum production, shortness of breath and wheezing.    Cardiovascular: Negative for chest pain, palpitations, orthopnea, claudication, leg swelling and PND.   Gastrointestinal: Negative for nausea and vomiting.   Neurological: Negative for dizziness and weakness.        Objective:   /62 (BP Location: Left arm, Patient Position: Sitting, BP Cuff Size: Adult)   Pulse (!) 41   Resp 16   Ht 1.753 m (5' 9\")   Wt 73.6 kg (162 lb 3.2 oz)   SpO2 98%   BMI 23.95 kg/m²     Physical Exam      Echocardiogram:  7/26/2021  Left Ventricle  Normal left ventricular chamber size. Normal left ventricular wall   thickness. Normal left ventricular systolic function. Left ventricular   ejection fraction is visually estimated to be 55%. Normal regional wall   motion. Normal diastolic function.     Right Ventricle  The right ventricle was normal in size and function.     Right Atrium  The right atrium is normal in size. Normal inferior vena cava size   without inspiratory collapse.     Left Atrium  The left atrium is normal in size. Left atrial volume index is 17 mL/sq   m.     Mitral Valve  Structurally normal mitral valve. No mitral stenosis. Trivial mitral   regurgitation.     Aortic Valve  Structurally normal aortic valve without significant stenosis or   regurgitation.     Tricuspid Valve  Structurally normal tricuspid valve without significant stenosis or   regurgitation.  Unable to estimate pulmonary artery pressure due to an   inadequate tricuspid regurgitant jet.     Pulmonic Valve  No pulmonic stenosis. Mild pulmonic insufficiency.     Pericardium  Normal pericardium without effusion.     Aorta  The aortic root is normal. Ascending aorta is dilated with a diameter   of " 4.1 cm.     Cardiac Catheterization:   7/26/2021  POSTOPERATIVE DIAGNOSIS:  1. 98% focal culprit OM 1 stenosis  2. Single-vessel coronary disease  3. LVEF 65%, LVEDP 10 mmHg  4. Successful PCI culprit OM1 (2.25 x 15 mm [place] FERNANDA), excellent result  Left main coronary artery: Large caliber bifurcating no CAD.  Left anterior descending artery: Large caliber usual complement diagonals no CAD.  Left circumflex coronary artery: Moderate caliber nondominant supplying a long moderate caliber OM1 with a 98% focal midportion stenosis.  Postintervention 0% residual stenosis in the stented segment and GUICHO-3 flow.  Right coronary artery: Large caliber vessel no significant epicardial CAD.     III.  LEFT VENTRICULOGRAM:  LVEF GONZALEZ PROJECTION: 60%        Imaging  CTA  7/25/2021  1.  Mild aneurysmal dilatation of the ascending aorta measured at 4.2 x 4.1 cm.     2.  No evidence of aortic dissection.     3.  Fatty change of the liver.  Assessment:     1. Coronary artery disease involving native coronary artery of native heart without angina pectoris     2. Ascending aorta dilation (HCC)     3. Essential hypertension, benign         Medical Decision Making:  Today's Assessment / Status / Plan:     1. CAD s/p PCI to OM1  -  Pt recovering well post stent placement.  He has not had any recurrence of chest pain or angina.  - He has been compliant with his medications without missed doses.  I discussed mechanism of action and importance of cardiac medications, especially DAPT.    - We reviewed heart healthy, low sodium low cholesterol diet today.  We also reviewed recommended activity guidelines per AHA guidelines.  - due to bradycardia HR down to 38 and fatigue, we will stop the coreg. He will monitor his blood pressure at home.   - continue to take asa, effient 10mg qd, atorvastatin 80mg qd  - ECHO post PCI ef 55%    2. Hypertension  - stable   - continue lisinopril 5mg qd     3. Ascending aorta dilation  - currently 4.1-4.3  - monitor  daily     Follow up in 6 months, sooner as needed.

## 2021-09-03 ENCOUNTER — TELEPHONE (OUTPATIENT)
Dept: CARDIOLOGY | Facility: MEDICAL CENTER | Age: 71
End: 2021-09-03

## 2021-09-03 NOTE — TELEPHONE ENCOUNTER
"Spoke with pt. He stopped Carvedilol as directed and is taking Lisinopril 5mg QD. His pulse has risen to 47-50 (\"normal for me\") but his BP'p=867-266/60's. Low BP's can cause some fatigue. He will try holding Lisinopril over the weekend and monitor his BP's.  Pt. Also noted increased bruising with DAPT but he realizes the importance of continuing Effient 10mg and ASA 81mg.  To Redet.  "

## 2021-09-03 NOTE — TELEPHONE ENCOUNTER
RT    Pt called to discuss his current medication. Please call back at [phone].     Thank you

## 2021-09-06 NOTE — TELEPHONE ENCOUNTER
Continue asa and effient, recommend he does not stop these medication due to recent stents.     Thank you,  RT

## 2021-09-07 NOTE — TELEPHONE ENCOUNTER
Called pt. This am. He did NOT hold Lisinopril over the weekend. DX=764/59 this am. Pt. Will hold Lisinopril or decrease by 1/2 and call with update at the end of the week. He understands the importance of DAPT.

## 2021-09-09 NOTE — TELEPHONE ENCOUNTER
Pt called back to give an update on his BP, yesterday it was 108-62 today: 113/66.    Thank you,  Yimi MERINO

## 2021-09-25 ENCOUNTER — HOSPITAL ENCOUNTER (EMERGENCY)
Facility: MEDICAL CENTER | Age: 71
End: 2021-09-25
Attending: EMERGENCY MEDICINE
Payer: MEDICARE

## 2021-09-25 VITALS
SYSTOLIC BLOOD PRESSURE: 115 MMHG | RESPIRATION RATE: 18 BRPM | HEART RATE: 43 BPM | BODY MASS INDEX: 23.51 KG/M2 | DIASTOLIC BLOOD PRESSURE: 74 MMHG | OXYGEN SATURATION: 99 % | TEMPERATURE: 97.7 F | WEIGHT: 158.73 LBS | HEIGHT: 69 IN

## 2021-09-25 DIAGNOSIS — T45.7X5A ADVERSE EFFECT OF ANTIPLATELET AGENT, INITIAL ENCOUNTER: ICD-10-CM

## 2021-09-25 DIAGNOSIS — S80.11XA CONTUSION OF RIGHT LOWER EXTREMITY, INITIAL ENCOUNTER: ICD-10-CM

## 2021-09-25 DIAGNOSIS — Z86.79 HISTORY OF CORONARY ARTERY DISEASE: ICD-10-CM

## 2021-09-25 PROCEDURE — 99284 EMERGENCY DEPT VISIT MOD MDM: CPT

## 2021-09-25 ASSESSMENT — FIBROSIS 4 INDEX: FIB4 SCORE: 11.02

## 2021-09-25 NOTE — ED TRIAGE NOTES
"Presents complaining of right lower leg hematoma.  He states: \"I bumped my leg an hour ago.\"  He is \"normally\" bradycardic (in fact his HR=53 in triage.   Chief Complaint   Patient presents with   • Leg Injury     /69   Pulse (!) 53 Comment: \"Normal for me.\"  Temp 36.4 °C (97.6 °F) (Temporal)   Resp 20   Ht 1.753 m (5' 9\")   Wt 72 kg (158 lb 11.7 oz)   SpO2 97%   BMI 23.44 kg/m²   Has this patient been vaccinated for COVID YES  If not, would they like to be vaccinated while in the ER if eligible?  N/A  Would the patient like to speak with the ERP about the possibility of receiving the COVID vaccine today before making a decision? N/A    "

## 2021-09-26 NOTE — DISCHARGE INSTRUCTIONS
You were seen and evaluated in the Emergency Department at [facility] for:     Bruising and swelling to your right calf after an injury.    This is hopefully just due to significant bruising from the thinning medicines you are taking after your heart procedure.  ----------------------------    Please make sure to follow up with:    Primary care provider for recheck and routine care, but if you have any worsening swelling or pain or chest pain or trouble breathing or any other new or worse symptoms come right back to the hospital!  ----------------------------    We always encourage patients to return IMMEDIATELY if they have:  Increased or changing pain, passing out, fevers over 100.4 (taken in your mouth or rectally) for more than 2 days, redness or swelling of skin or tissues, feeling like your heart is beating fast, chest pain that is new or worsening, trouble breathing, feeling like your throat is closing up and can not breath, inability to walk, weakness of any part of your body, new dizziness, severe bleeding that won't stop from any part of your body, if you can't eat or drink, or if you have any other concerns.   If you feel worse, please know that you can always return with any questions, concerns, worse symptoms, or you are feeling unsafe. We certainly cannot say for sure that we have ruled out every illness or dangerous disease, but we feel that at this specific time, your exam, tests, and vital signs like heart rate and blood pressure are safe for discharge.

## 2021-09-26 NOTE — ED NOTES
Pt cleared for d/c  dischg instructions given to  Pt  Verbally understands  D/c'ed to home in NAD

## 2021-09-26 NOTE — ED PROVIDER NOTES
ED Provider Note    CHIEF COMPLAINT  Chief Complaint   Patient presents with   • Leg Injury       HPI    Primary care provider: Diamond Clayton M.D.  Means of arrival: POV  History obtained from: Patient and wife  History limited by: Ana Burnette Alpesh Bowers is a 71 y.o. male who presents with right calf bruising/contusion.  Onset about an hour ago.  The patient was lifting up the cover to a crawl space, and he bumped the crawlspace cover into his right calf.  He immediately started developing some swelling and bruising to his right medial calf.  This occurred very quickly after the injury.  He did not fall to the ground or hit his head or lose consciousness.  No other sites of injury or pain or recent medical complaints.  Wife saw the degree of swelling and bruising localized to the direct site of injury and she was worried he could have a blood clot, so he came in for emergent evaluation.  2 months ago he had an NSTEMI, and has been taking low-dose aspirin as well as prasugrel since that time.  He has been able to bear weight without difficulty he does not even complain of pain to the site, just wanted to make sure he does not have a blood clot.    REVIEW OF SYSTEMS  Constitutional: Negative for fever or chills.   HENT: Negative for nosebleeds or sore throat.    Eyes: Negative for vision changes or discharge.   Respiratory: Negative for cough or shortness of breath.    Cardiovascular: Negative for chest pain or palpitations.   Gastrointestinal: Negative for nausea, vomiting, abdominal pain.   Genitourinary: Negative for dysuria or flank pain.   Musculoskeletal: Negative for back pain or joint pain.   Skin: Neg positive for bruising to the right calf,  Neurological: Negative for sensory or motor changes.  Numbness or weakness.    PAST MEDICAL HISTORY   has a past medical history of Ascending aorta dilation (HCC) and CAD (coronary artery disease).    PAST FAMILY HISTORY  Family History   Problem Relation Age  "of Onset   • Heart Disease Mother    • Heart Failure Mother        SOCIAL HISTORY  Social History     Tobacco Use   • Smoking status: Former Smoker   • Smokeless tobacco: Never Used   Substance and Sexual Activity   • Alcohol use: Not Currently   • Drug use: No   • Sexual activity: Not on file       SURGICAL HISTORY   has a past surgical history that includes knee arthroscopy; shoulder arthroscopy; and zzz cardiac cath (07/2021).    CURRENT MEDICATIONS  Home Medications     Reviewed by Eduardo Gary R.N. (Registered Nurse) on 09/25/21 at 1635  Med List Status: Not Addressed   Medication Last Dose Status   Ascorbic Acid (VITAMIN C PO)  Active   aspirin EC 81 MG EC tablet  Active   atorvastatin (LIPITOR) 80 MG tablet  Active   Cholecalciferol (D3 PO)  Active   Cyanocobalamin (B-12 PO)  Active   lisinopril (PRINIVIL) 5 MG Tab  Active   prasugrel (EFFIENT) 10 MG Tab  Active                ALLERGIES  No Known Allergies    PHYSICAL EXAM  VITAL SIGNS: /74   Pulse (!) 43   Temp 36.5 °C (97.7 °F) (Temporal)   Resp 18   Ht 1.753 m (5' 9\")   Wt 72 kg (158 lb 11.7 oz)   SpO2 99%   BMI 23.44 kg/m²    Pulse ox interpretation: On room air, I interpret this pulse ox as normal.  Constitutional: Very well-appearing for age, sitting up.  HEENT: Normocephalic, atraumatic. Posterior pharynx clear, mucous membranes moist.  Eyes:  EOMI. Normal sclerae.  Neck: Supple, nontender.  Chest/Pulmonary: Clear to ausculation bilaterally, no wheezes or rhonchi.  Cardiovascular: Regular rate and rhythm, no obvious murmur.   Abdomen: Soft, nontender; no rebound, guarding, or masses.  Back: No CVA or midline tenderness.   Musculoskeletal: There is bruising and contusion to the right calf, nontender, able to bear weight without difficulty.  Neuro: Clear speech, normal strength and sensation.  Psych: Normal mood and affect.  Skin: Bruising to right calf skin otherwise warm and dry.      COURSE & MEDICAL DECISION MAKING    This is a 71 y.o. " male who presents with bruising and swelling to right calf after direct blow injury.    Differential Diagnosis includes but is not limited to:  Contusion, bleed, coagulopathy, less likely clot    ED Course:  This is a 71-year-old male on dual antiplatelet therapy who bumped his right calf and developed a bruise suddenly.  This appears to only be a contusion clinically.  He is in no distress.  There is no other swelling to the leg and this all began immediately after direct blow injury.  He is able to bear weight without any pain there is no bony tenderness or deformity doubt fracture.  Patient's wife was worried he may have developed a blood clot, I find this to be highly unlikely.  I shared this thought with the patient and he is relieved, I did offer to do a duplex venous ultrasound to ensure this is not a DVT but the patient would like to defer this; I think this is very reasonable .  I told him that this injury could lead to developing a clot soon, so if he has any worsening swelling or chest pain or dyspnea or pain in his calf or any other concerning new symptoms he should be reevaluated immediately.    Medications - No data to display    FINAL IMPRESSION  1. Contusion of right lower extremity, initial encounter    2. Adverse effect of antiplatelet agent, initial encounter    3. History of coronary artery disease        PRESCRIPTIONS  Discharge Medication List as of 9/25/2021  5:38 PM          FOLLOW UP  Diamond Clayton M.D.  [address] [id number] [phone]    Schedule an appointment as soon as possible for a visit in 3 days      [facility], Emergency Dept  [address]  [facility] [id number] [phone]  Today  If you have ANY new or worse symptoms!        -DISCHARGE-       Exam findings, clinical impression, presumed diagnosis, treatment options, and strict return precautions were discussed with the patient, and they verbalized  understanding, agreed with, and appreciated the plan of care.    I personally reviewed and verified the patient's nursing notes, as well as past medical, surgical, and social history.     Portions of this record were made with voice recognition software.  Despite my review, spelling/grammar/context errors may still remain.  Interpretation of this chart should be taken in this context.    Electronically signed by Ezekiel Madrigal M.D. on 9/25/2021 at 11:34 PM.

## 2021-11-15 ENCOUNTER — APPOINTMENT (OUTPATIENT)
Dept: LAB | Facility: MEDICAL CENTER | Age: 71
End: 2021-11-15
Payer: MEDICARE

## 2021-11-26 ENCOUNTER — HOSPITAL ENCOUNTER (EMERGENCY)
Facility: MEDICAL CENTER | Age: 71
End: 2021-11-26
Attending: EMERGENCY MEDICINE
Payer: MEDICARE

## 2021-11-26 VITALS
OXYGEN SATURATION: 99 % | DIASTOLIC BLOOD PRESSURE: 82 MMHG | BODY MASS INDEX: 24.83 KG/M2 | WEIGHT: 163.8 LBS | HEIGHT: 68 IN | SYSTOLIC BLOOD PRESSURE: 127 MMHG | TEMPERATURE: 97.7 F | RESPIRATION RATE: 18 BRPM | HEART RATE: 48 BPM

## 2021-11-26 PROCEDURE — 302449 STATCHG TRIAGE ONLY (STATISTIC)

## 2021-11-26 RX ORDER — LIDOCAINE HYDROCHLORIDE AND EPINEPHRINE 10; 10 MG/ML; UG/ML
10 INJECTION, SOLUTION INFILTRATION; PERINEURAL ONCE
Status: DISCONTINUED | OUTPATIENT
Start: 2021-11-26 | End: 2021-11-26 | Stop reason: HOSPADM

## 2021-11-26 ASSESSMENT — FIBROSIS 4 INDEX: FIB4 SCORE: 11.02

## 2021-11-26 NOTE — ED TRIAGE NOTES
Pt presents by self from home for a splinter in left palm that occurred last week about 1.25in in length. believes part of it was retained in palm. Tetanus unknown. A&Ox4 and ambulatory.    Has this patient been vaccinated for COVID yes

## 2021-12-07 ENCOUNTER — HOSPITAL ENCOUNTER (OUTPATIENT)
Dept: LAB | Facility: MEDICAL CENTER | Age: 71
End: 2021-12-07
Attending: FAMILY MEDICINE
Payer: MEDICARE

## 2021-12-07 ENCOUNTER — APPOINTMENT (OUTPATIENT)
Dept: RADIOLOGY | Facility: MEDICAL CENTER | Age: 71
End: 2021-12-07
Attending: EMERGENCY MEDICINE
Payer: MEDICARE

## 2021-12-07 ENCOUNTER — HOSPITAL ENCOUNTER (OUTPATIENT)
Facility: MEDICAL CENTER | Age: 71
End: 2021-12-09
Attending: EMERGENCY MEDICINE | Admitting: FAMILY MEDICINE
Payer: MEDICARE

## 2021-12-07 DIAGNOSIS — L02.519 HAND ABSCESS: ICD-10-CM

## 2021-12-07 DIAGNOSIS — S60.459A FOREIGN BODY IN SKIN OF FINGER, INITIAL ENCOUNTER: ICD-10-CM

## 2021-12-07 PROBLEM — R00.1 BRADYCARDIA: Status: ACTIVE | Noted: 2021-12-07

## 2021-12-07 LAB
ALBUMIN SERPL BCP-MCNC: 4.3 G/DL (ref 3.2–4.9)
ALBUMIN/GLOB SERPL: 1.7 G/DL
ALP SERPL-CCNC: 86 U/L (ref 30–99)
ALT SERPL-CCNC: 32 U/L (ref 2–50)
ANION GAP SERPL CALC-SCNC: 10 MMOL/L (ref 7–16)
ANION GAP SERPL CALC-SCNC: 8 MMOL/L (ref 7–16)
AST SERPL-CCNC: 25 U/L (ref 12–45)
BASOPHILS # BLD AUTO: 0.6 % (ref 0–1.8)
BASOPHILS # BLD: 0.03 K/UL (ref 0–0.12)
BILIRUB SERPL-MCNC: 0.7 MG/DL (ref 0.1–1.5)
BUN SERPL-MCNC: 18 MG/DL (ref 8–22)
BUN SERPL-MCNC: 18 MG/DL (ref 8–22)
CALCIUM SERPL-MCNC: 9.2 MG/DL (ref 8.4–10.2)
CALCIUM SERPL-MCNC: 9.4 MG/DL (ref 8.4–10.2)
CHLORIDE SERPL-SCNC: 105 MMOL/L (ref 96–112)
CHLORIDE SERPL-SCNC: 106 MMOL/L (ref 96–112)
CHOLEST SERPL-MCNC: 128 MG/DL (ref 100–199)
CO2 SERPL-SCNC: 26 MMOL/L (ref 20–33)
CO2 SERPL-SCNC: 28 MMOL/L (ref 20–33)
CREAT SERPL-MCNC: 0.99 MG/DL (ref 0.5–1.4)
CREAT SERPL-MCNC: 1.02 MG/DL (ref 0.5–1.4)
CRP SERPL HS-MCNC: <0.3 MG/DL (ref 0–0.75)
EOSINOPHIL # BLD AUTO: 0.05 K/UL (ref 0–0.51)
EOSINOPHIL NFR BLD: 1.1 % (ref 0–6.9)
ERYTHROCYTE [DISTWIDTH] IN BLOOD BY AUTOMATED COUNT: 43.3 FL (ref 35.9–50)
ERYTHROCYTE [SEDIMENTATION RATE] IN BLOOD BY WESTERGREN METHOD: 10 MM/HOUR (ref 0–20)
FASTING STATUS PATIENT QL REPORTED: NORMAL
GLOBULIN SER CALC-MCNC: 2.6 G/DL (ref 1.9–3.5)
GLUCOSE SERPL-MCNC: 85 MG/DL (ref 65–99)
GLUCOSE SERPL-MCNC: 93 MG/DL (ref 65–99)
HCT VFR BLD AUTO: 45.4 % (ref 42–52)
HDLC SERPL-MCNC: 78 MG/DL
HGB BLD-MCNC: 15.5 G/DL (ref 14–18)
IMM GRANULOCYTES # BLD AUTO: 0.01 K/UL (ref 0–0.11)
IMM GRANULOCYTES NFR BLD AUTO: 0.2 % (ref 0–0.9)
LDLC SERPL CALC-MCNC: 45 MG/DL
LYMPHOCYTES # BLD AUTO: 1.49 K/UL (ref 1–4.8)
LYMPHOCYTES NFR BLD: 31.8 % (ref 22–41)
MCH RBC QN AUTO: 33.4 PG (ref 27–33)
MCHC RBC AUTO-ENTMCNC: 34.1 G/DL (ref 33.7–35.3)
MCV RBC AUTO: 97.8 FL (ref 81.4–97.8)
MONOCYTES # BLD AUTO: 0.47 K/UL (ref 0–0.85)
MONOCYTES NFR BLD AUTO: 10 % (ref 0–13.4)
NEUTROPHILS # BLD AUTO: 2.63 K/UL (ref 1.82–7.42)
NEUTROPHILS NFR BLD: 56.3 % (ref 44–72)
NRBC # BLD AUTO: 0 K/UL
NRBC BLD-RTO: 0 /100 WBC
PLATELET # BLD AUTO: 158 K/UL (ref 164–446)
PMV BLD AUTO: 9.6 FL (ref 9–12.9)
POTASSIUM SERPL-SCNC: 4.2 MMOL/L (ref 3.6–5.5)
POTASSIUM SERPL-SCNC: 4.8 MMOL/L (ref 3.6–5.5)
PROT SERPL-MCNC: 6.9 G/DL (ref 6–8.2)
PSA SERPL-MCNC: 0.53 NG/ML (ref 0–4)
RBC # BLD AUTO: 4.64 M/UL (ref 4.7–6.1)
SARS-COV+SARS-COV-2 AG RESP QL IA.RAPID: NOTDETECTED
SODIUM SERPL-SCNC: 141 MMOL/L (ref 135–145)
SODIUM SERPL-SCNC: 142 MMOL/L (ref 135–145)
SPECIMEN SOURCE: NORMAL
TRIGL SERPL-MCNC: 27 MG/DL (ref 0–149)
WBC # BLD AUTO: 4.7 K/UL (ref 4.8–10.8)

## 2021-12-07 PROCEDURE — 84153 ASSAY OF PSA TOTAL: CPT

## 2021-12-07 PROCEDURE — 80048 BASIC METABOLIC PNL TOTAL CA: CPT

## 2021-12-07 PROCEDURE — A9270 NON-COVERED ITEM OR SERVICE: HCPCS | Performed by: FAMILY MEDICINE

## 2021-12-07 PROCEDURE — 73201 CT UPPER EXTREMITY W/DYE: CPT | Mod: LT,ME

## 2021-12-07 PROCEDURE — 80061 LIPID PANEL: CPT

## 2021-12-07 PROCEDURE — 99219 PR INITIAL OBSERVATION CARE,LEVL II: CPT | Performed by: FAMILY MEDICINE

## 2021-12-07 PROCEDURE — 99285 EMERGENCY DEPT VISIT HI MDM: CPT

## 2021-12-07 PROCEDURE — G0378 HOSPITAL OBSERVATION PER HR: HCPCS

## 2021-12-07 PROCEDURE — 85025 COMPLETE CBC W/AUTO DIFF WBC: CPT

## 2021-12-07 PROCEDURE — 700102 HCHG RX REV CODE 250 W/ 637 OVERRIDE(OP): Performed by: FAMILY MEDICINE

## 2021-12-07 PROCEDURE — 87426 SARSCOV CORONAVIRUS AG IA: CPT

## 2021-12-07 PROCEDURE — 36415 COLL VENOUS BLD VENIPUNCTURE: CPT

## 2021-12-07 PROCEDURE — 86140 C-REACTIVE PROTEIN: CPT

## 2021-12-07 PROCEDURE — 85652 RBC SED RATE AUTOMATED: CPT

## 2021-12-07 PROCEDURE — 80053 COMPREHEN METABOLIC PANEL: CPT

## 2021-12-07 PROCEDURE — 700117 HCHG RX CONTRAST REV CODE 255: Performed by: EMERGENCY MEDICINE

## 2021-12-07 RX ORDER — POLYETHYLENE GLYCOL 3350 17 G/17G
1 POWDER, FOR SOLUTION ORAL
Status: DISCONTINUED | OUTPATIENT
Start: 2021-12-07 | End: 2021-12-09 | Stop reason: HOSPADM

## 2021-12-07 RX ORDER — ACETAMINOPHEN 325 MG/1
650 TABLET ORAL EVERY 6 HOURS PRN
Status: DISCONTINUED | OUTPATIENT
Start: 2021-12-07 | End: 2021-12-09 | Stop reason: HOSPADM

## 2021-12-07 RX ORDER — AMOXICILLIN 250 MG
2 CAPSULE ORAL 2 TIMES DAILY
Status: DISCONTINUED | OUTPATIENT
Start: 2021-12-07 | End: 2021-12-09 | Stop reason: HOSPADM

## 2021-12-07 RX ORDER — BISACODYL 10 MG
10 SUPPOSITORY, RECTAL RECTAL
Status: DISCONTINUED | OUTPATIENT
Start: 2021-12-07 | End: 2021-12-09 | Stop reason: HOSPADM

## 2021-12-07 RX ORDER — CEPHALEXIN 500 MG/1
500 CAPSULE ORAL 4 TIMES DAILY
Status: ON HOLD | COMMUNITY
End: 2021-12-09

## 2021-12-07 RX ORDER — MORPHINE SULFATE 4 MG/ML
4 INJECTION INTRAVENOUS
Status: DISCONTINUED | OUTPATIENT
Start: 2021-12-07 | End: 2021-12-09 | Stop reason: HOSPADM

## 2021-12-07 RX ORDER — OXYCODONE HYDROCHLORIDE 5 MG/1
5 TABLET ORAL
Status: DISCONTINUED | OUTPATIENT
Start: 2021-12-07 | End: 2021-12-09 | Stop reason: HOSPADM

## 2021-12-07 RX ORDER — OXYCODONE HYDROCHLORIDE 10 MG/1
10 TABLET ORAL
Status: DISCONTINUED | OUTPATIENT
Start: 2021-12-07 | End: 2021-12-09 | Stop reason: HOSPADM

## 2021-12-07 RX ORDER — ATORVASTATIN CALCIUM 40 MG/1
80 TABLET, FILM COATED ORAL EVERY EVENING
Status: DISCONTINUED | OUTPATIENT
Start: 2021-12-07 | End: 2021-12-09 | Stop reason: HOSPADM

## 2021-12-07 RX ADMIN — IOHEXOL 80 ML: 350 INJECTION, SOLUTION INTRAVENOUS at 11:50

## 2021-12-07 RX ADMIN — ASPIRIN 81 MG: 81 TABLET, COATED ORAL at 17:54

## 2021-12-07 RX ADMIN — ATORVASTATIN CALCIUM 80 MG: 40 TABLET, FILM COATED ORAL at 17:54

## 2021-12-07 ASSESSMENT — PATIENT HEALTH QUESTIONNAIRE - PHQ9
SUM OF ALL RESPONSES TO PHQ9 QUESTIONS 1 AND 2: 0
1. LITTLE INTEREST OR PLEASURE IN DOING THINGS: NOT AT ALL
2. FEELING DOWN, DEPRESSED, IRRITABLE, OR HOPELESS: NOT AT ALL

## 2021-12-07 ASSESSMENT — ENCOUNTER SYMPTOMS
CHILLS: 0
SHORTNESS OF BREATH: 0
NAUSEA: 0
FEVER: 0
COUGH: 0
VOMITING: 0
ORTHOPNEA: 0
ABDOMINAL PAIN: 0
PALPITATIONS: 0

## 2021-12-07 ASSESSMENT — COGNITIVE AND FUNCTIONAL STATUS - GENERAL
DAILY ACTIVITIY SCORE: 24
SUGGESTED CMS G CODE MODIFIER MOBILITY: CH
SUGGESTED CMS G CODE MODIFIER DAILY ACTIVITY: CH
MOBILITY SCORE: 24

## 2021-12-07 ASSESSMENT — FIBROSIS 4 INDEX
FIB4 SCORE: 11.02
FIB4 SCORE: 1.96

## 2021-12-07 NOTE — ASSESSMENT & PLAN NOTE
- Pt had a large splinter to his L hand two weeks ago that he removed himself, failed a 7d course of Keflex after being seen in [place]  - CT with soft tissue abscess located within the web between the first and second digits and extending into the thenar area. This is multiloculated in nature with the largest component located volarly within the thenar area measuring 1 cm in size. The volar area does contain a linear slightly dense focus which may represent a retained splinter.  - Dr Ochoa to take pt to the OR ;  no antibiotics for now  - NPO for now  Pain control as needed

## 2021-12-07 NOTE — ED PROVIDER NOTES
"ED Provider Note    CHIEF COMPLAINT  Chief Complaint   Patient presents with   • Hand Pain     Left, states had a splinter in hand \"couple of weeks ago,\" had been taking antibx, is concerned \"now I have all these lumps and things\"       HPI  Vasile Bowers is a 71 y.o. male who presents to the emergency department through triage for left hand, thumb pain and swelling. Patient states a couple of weeks ago he had a splinter in his hand. He was seen at the [facility], started on oral antibiotics. X-ray was completed that time and \"there was nothing in there.\" Patient completed unknown antibiotic course yesterday and states he has had persistent discomfort, now increased swelling and \"lumps\" in the proximal thumb region. Some mild discomfort with range of motion. Denies fevers. Denies any swelling, discomfort or streaking to the proximal extremity.    REVIEW OF SYSTEMS  See HPI for further details. All other systems are negative.     PAST MEDICAL HISTORY   has a past medical history of Ascending aorta dilation (HCC) and CAD (coronary artery disease).    SOCIAL HISTORY  Social History     Tobacco Use   • Smoking status: Former Smoker   • Smokeless tobacco: Never Used   Vaping Use   • Vaping Use: Never used   Substance and Sexual Activity   • Alcohol use: Not Currently   • Drug use: No   • Sexual activity: Not on file       SURGICAL HISTORY   has a past surgical history that includes knee arthroscopy; shoulder arthroscopy; and zzz cardiac cath (07/2021).    CURRENT MEDICATIONS  Home Medications     Reviewed by Luz Caceres (Pharmacy Tech) on 12/07/21 at 1042  Med List Status: Complete   Medication Last Dose Status   Ascorbic Acid (VITAMIN C PO) 12/6/2021 Active   aspirin EC 81 MG EC tablet 12/6/2021 Active   atorvastatin (LIPITOR) 80 MG tablet 12/6/2021 Active   cephALEXin (KEFLEX) 500 MG Cap 12/5/2021 Active   Cholecalciferol (D3 PO) 12/6/2021 Active   Cyanocobalamin (B-12 PO) 12/6/2021 Active " "  prasugrel (EFFIENT) 10 MG Tab 12/6/2021 Active                ALLERGIES  No Known Allergies    PHYSICAL EXAM  VITAL SIGNS: /82   Pulse (!) 50   Temp 36.4 °C (97.6 °F) (Temporal)   Resp 15   Ht 1.727 m (5' 8\")   Wt 74.4 kg (164 lb 0.4 oz)   SpO2 99%   BMI 24.94 kg/m²   Pulse ox interpretation: I interpret this pulse ox as normal.  Constitutional: Alert in no apparent distress.  HENT: Normocephalic, atraumatic. Bilateral external ears normal, Nose normal.   Eyes: Pupils are equal and reactive, Conjunctiva normal.   Neck: Normal range of motion  Lymphatic: No lymphadenopathy noted.   Cardiovascular: Peripheral perfusion.  Thorax & Lungs: Nonlabored respirations.  Skin: Warm, Dry  Musculoskeletal: Left base of the thumb, thenar eminence with multiple fluctuant subcutaneous almost papules. No erythema or crepitus. Mild discomfort with palpation. Flexion extension intact with mild discomfort.  Neurologic: Alert and orient x4. Ambulates independently.  Psychiatric: Affect normal, Judgment normal, Mood normal.       DIAGNOSTIC STUDIES / PROCEDURES    LABS  Results for orders placed or performed during the hospital encounter of 12/07/21   CBC WITH DIFFERENTIAL   Result Value Ref Range    WBC 4.7 (L) 4.8 - 10.8 K/uL    RBC 4.64 (L) 4.70 - 6.10 M/uL    Hemoglobin 15.5 14.0 - 18.0 g/dL    Hematocrit 45.4 42.0 - 52.0 %    MCV 97.8 81.4 - 97.8 fL    MCH 33.4 (H) 27.0 - 33.0 pg    MCHC 34.1 33.7 - 35.3 g/dL    RDW 43.3 35.9 - 50.0 fL    Platelet Count 158 (L) 164 - 446 K/uL    MPV 9.6 9.0 - 12.9 fL    Neutrophils-Polys 56.30 44.00 - 72.00 %    Lymphocytes 31.80 22.00 - 41.00 %    Monocytes 10.00 0.00 - 13.40 %    Eosinophils 1.10 0.00 - 6.90 %    Basophils 0.60 0.00 - 1.80 %    Immature Granulocytes 0.20 0.00 - 0.90 %    Nucleated RBC 0.00 /100 WBC    Neutrophils (Absolute) 2.63 1.82 - 7.42 K/uL    Lymphs (Absolute) 1.49 1.00 - 4.80 K/uL    Monos (Absolute) 0.47 0.00 - 0.85 K/uL    Eos (Absolute) 0.05 0.00 - 0.51 " K/uL    Baso (Absolute) 0.03 0.00 - 0.12 K/uL    Immature Granulocytes (abs) 0.01 0.00 - 0.11 K/uL    NRBC (Absolute) 0.00 K/uL   BASIC METABOLIC PANEL   Result Value Ref Range    Sodium 141 135 - 145 mmol/L    Potassium 4.2 3.6 - 5.5 mmol/L    Chloride 105 96 - 112 mmol/L    Co2 26 20 - 33 mmol/L    Glucose 85 65 - 99 mg/dL    Bun 18 8 - 22 mg/dL    Creatinine 0.99 0.50 - 1.40 mg/dL    Calcium 9.2 8.4 - 10.2 mg/dL    Anion Gap 10.0 7.0 - 16.0   Sed Rate   Result Value Ref Range    Sed Rate Westergren 10 0 - 20 mm/hour   CRP QUANTITIVE (NON-CARDIAC)   Result Value Ref Range    Stat C-Reactive Protein <0.30 0.00 - 0.75 mg/dL   ESTIMATED GFR   Result Value Ref Range    GFR If African American >60 >60 mL/min/1.73 m 2    GFR If Non African American >60 >60 mL/min/1.73 m 2       RADIOLOGY  CT-EXTREMITY, UPPER WITH LEFT   Final Result      1.  No evidence of fracture or bony destructive change      2.  Soft tissue abscess located within the web between the first and second digits and extending into the thenar area. This is multiloculated in nature with the largest component located volarly within the thenar area measuring 1 cm in size. The volar    area does contain a linear slightly dense focus which may represent a retained splinter.      3.   Cellulitis.      4.  Multifocal osteoarthritis.          COURSE & MEDICAL DECISION MAKING  Nursing notes and vital signs were reviewed. (See chart for details)  The patients records were reviewed, history was obtained from the patient;    Pharmacy to obtain recent antibiotic list.    Antibiotic confirmed, Keflex 4 times daily x7 days starting 11/29.    ED evaluation does demonstrate multiple loculated abscesses in the left hand/thumb/thenar eminence as described on exam.  No overlying cellulitis.  No leukocytosis, left shift or bandemia.  Normal CRP and ESR.  Vital signs are stable without fever, tachycardia or hypotension.  Will discuss with orthopedics.    [id number] -Dr. Ochoa  paged.    1:10 PM -Dr. Ochoa is aware of patient and CT findings.  We will plan the OR for washout, either late tonight or tomorrow morning.  Will update when available.  Continue n.p.o. for now.  Hold antibiotics for now as well, until or culture, this seems reasonable given otherwise unremarkable work-up.  Patient is aware the findings, agreeable to the disposition and plan.    1:15 PM - Dr. White is aware the patient agreeable consultation.    FINAL IMPRESSION  (L02.519) Hand abscess  (S60.459A) Foreign body in skin of finger, initial encounter      Electronically signed by: Jodi Saldivar D.O., 12/7/2021 10:25 AM      This dictation was created using voice recognition software. The accuracy of the dictation is limited to the abilities of the software. I expect there may be some errors of grammar and possibly content. The nursing notes were reviewed and certain aspects of this information were incorporated into this note.

## 2021-12-07 NOTE — H&P
"Hospital Medicine History & Physical Note    Date of Service  12/7/2021    Primary Care Physician  Diamond Clayton M.D.    Consultants  orthopedics    Specialist Names: Gabriela    Code Status  Prior    Chief Complaint  Chief Complaint   Patient presents with   • Hand Pain     Left, states had a splinter in hand \"couple of weeks ago,\" had been taking antibx, is concerned \"now I have all these lumps and things\"       History of Presenting Illness  Vasile Bowers is a 71 y.o. male who presented 12/7/2021 with a history of CAD (OM1 FERNANDA placed in July of this year), AAA, HTN and HLD who was admitted to hospital with  \"lumps\" to his L hand after experiencing a long splinter when picking up a pick two weeks ago. At that time, he went to the [place] [facility] and states he was told he had no foreign body and was sent home with 7d of Keflex which he finished. He denies fever or chills at home, no erythema, just fluctuance of the palm. Last dose of his Effient was yesterday; he has had no chest pain, no SOB, exerts himself without any issues. Dr Ochoa plans to take pt to the OR tonight or tomorrow, requesting hold off on antibiotics in order to get intraop cultures.     I discussed the plan of care with patient and cardiology and orthopedics.    Review of Systems  Review of Systems   Constitutional: Negative for chills and fever.   Respiratory: Negative for cough and shortness of breath.    Cardiovascular: Negative for chest pain, palpitations and orthopnea.   Gastrointestinal: Negative for abdominal pain, nausea and vomiting.   Skin: Positive for rash.   All other systems reviewed and are negative.      Past Medical History   has a past medical history of Ascending aorta dilation (HCC) and CAD (coronary artery disease).    Surgical History   has a past surgical history that includes knee arthroscopy; shoulder arthroscopy; and [facility] cardiac cath (07/2021).     Family History  family history includes Heart " Disease in his mother; Heart Failure in his mother.   Family history reviewed with patient. There is no family history that is pertinent to the chief complaint.     Social History   reports that he has quit smoking. He has never used smokeless tobacco. He reports previous alcohol use. He reports that he does not use drugs.    Allergies  No Known Allergies    Medications  Prior to Admission Medications   Prescriptions Last Dose Informant Patient Reported? Taking?   Ascorbic Acid (VITAMIN C PO) 12/6/2021 at 0900 Patient Yes No   Sig: Take 1 tablet by mouth every day.   Cholecalciferol (D3 PO) 12/6/2021 at 0900 Patient Yes No   Sig: Take 1 capsule by mouth every day.   Cyanocobalamin (B-12 PO) 12/6/2021 at 0900 Patient Yes No   Sig: Take 1 tablet by mouth every day.   aspirin EC 81 MG EC tablet 12/6/2021 at 1700 Patient No No   Sig: Take 1 tablet by mouth every day.   Patient taking differently: Take 81 mg by mouth every evening.   atorvastatin (LIPITOR) 80 MG tablet 12/6/2021 at 1700 Patient No No   Sig: Take 1 tablet by mouth every evening.   cephALEXin (KEFLEX) 500 MG Cap 12/5/2021 at FINISHED Patient's Home Pharmacy Yes Yes   Sig: Take 500 mg by mouth 4 times a day. Pt started on 11/29/2021 for 7 day course   prasugrel (EFFIENT) 10 MG Tab 12/6/2021 at 1700 Patient No No   Sig: Take 1 tablet by mouth every day.   Patient taking differently: Take 10 mg by mouth every evening.      Facility-Administered Medications: None       Physical Exam  Temp:  [36.4 °C (97.6 °F)] 36.4 °C (97.6 °F)  Pulse:  [47-50] 50  Resp:  [15] 15  BP: (131-142)/(82-87) 131/82  SpO2:  [98 %-99 %] 99 %  Blood Pressure : 131/82   Temperature: 36.4 °C (97.6 °F)   Pulse: (!) 50   Respiration: 15   Pulse Oximetry: 99 %       Physical Exam  Vitals and nursing note reviewed.   Constitutional:       General: He is not in acute distress.     Appearance: Normal appearance. He is not ill-appearing.   HENT:      Head: Normocephalic and atraumatic.    Cardiovascular:      Rate and Rhythm: Regular rhythm. Bradycardia present.      Heart sounds: No murmur heard.      Pulmonary:      Effort: Pulmonary effort is normal. No respiratory distress.      Breath sounds: Normal breath sounds. No wheezing, rhonchi or rales.   Abdominal:      General: Abdomen is flat.   Musculoskeletal:         General: No swelling.   Skin:     Comments: L hand with lump to the thenar process, and dorsum of thumb. No erythema noted, no edema.   Neurological:      General: No focal deficit present.      Mental Status: He is alert and oriented to person, place, and time.   Psychiatric:         Mood and Affect: Mood normal.         Behavior: Behavior normal.         Laboratory:  Recent Labs     12/07/21  1022   WBC 4.7*   RBC 4.64*   HEMOGLOBIN 15.5   HEMATOCRIT 45.4   MCV 97.8   MCH 33.4*   MCHC 34.1   RDW 43.3   PLATELETCT 158*   MPV 9.6     Recent Labs     12/07/21  0942 12/07/21  1022   SODIUM 142 141   POTASSIUM 4.8 4.2   CHLORIDE 106 105   CO2 28 26   GLUCOSE 93 85   BUN 18 18   CREATININE 1.02 0.99   CALCIUM 9.4 9.2     Recent Labs     12/07/21  0942 12/07/21  1022   ALTSGPT 32  --    ASTSGOT 25  --    ALKPHOSPHAT 86  --    TBILIRUBIN 0.7  --    GLUCOSE 93 85         No results for input(s): NTPROBNP in the last 72 hours.  Recent Labs     12/07/21  0942   TRIGLYCERIDE 27   HDL 78   LDL 45     No results for input(s): TROPONINT in the last 72 hours.    Imaging:  CT-EXTREMITY, UPPER WITH LEFT   Final Result      1.  No evidence of fracture or bony destructive change      2.  Soft tissue abscess located within the web between the first and second digits and extending into the thenar area. This is multiloculated in nature with the largest component located volarly within the thenar area measuring 1 cm in size. The volar    area does contain a linear slightly dense focus which may represent a retained splinter.      3.   Cellulitis.      4.  Multifocal osteoarthritis.          no X-Ray or  EKG requiring interpretation    Assessment/Plan:  I anticipate this patient is appropriate for observation status at this time.    * Hand abscess- (present on admission)  Assessment & Plan  - Pt had a large splinter to his L hand two weeks ago that he removed himself, failed a 7d course of Keflex after being seen in [place]  - CT with soft tissue abscess located within the web between the first and second digits and extending into the thenar area. This is multiloculated in nature with the largest component located volarly within the thenar area measuring 1 cm in size. The volar area does contain a linear slightly dense focus which may represent a retained splinter.  - Dr Ochoa to take pt to the OR tonight or tomorrow, requests no antibiotics for now in order to obtain accurate intraoperative cultures  - NPO for now  - Recently with FERNANDA to OM1 in July - discussed with Dr Jennings, no further interventions needed prior to OR. Resume Effient in the am tomorrow after OR.     Bradycardia  Assessment & Plan  - Asymptomatic  - Monitor   - Check TSH in the am   - Does not appear to be BB or CCB     Essential hypertension, benign- (present on admission)  Assessment & Plan  - Per notes in July, pt was on Coreg and Lisinopril at that time - he states he no longer takes those     Coronary artery disease involving native coronary artery of native heart without angina pectoris- (present on admission)  Assessment & Plan  - Stent placed to OM1 in July, is on DAPT with ASA and Effient, last taken yesterday  - Continue Lipitor        VTE prophylaxis: SCDs/TEDs

## 2021-12-07 NOTE — ASSESSMENT & PLAN NOTE
- Per notes in July, pt was on Coreg and Lisinopril at that time - he states he no longer takes those

## 2021-12-07 NOTE — ED TRIAGE NOTES
"Chief Complaint   Patient presents with   • Hand Pain     Left, states had a splinter in hand \"couple of weeks ago,\" had been taking antibx, is concerned \"now I have all these lumps and things\"     /87   Pulse (!) 47   Temp 36.4 °C (97.6 °F) (Temporal)   Resp 15   Ht 1.727 m (5' 8\")   Wt 74.4 kg (164 lb 0.4 oz)   SpO2 98%   BMI 24.94 kg/m²     Has this patient been vaccinated for COVID YES x 3  If not, would they like to be vaccinated while in the ER if eligible?  NA  Would the patient like to speak with the ERP about the possibility of receiving the COVID vaccine today before making a decision? NA        "

## 2021-12-07 NOTE — ED NOTES
Med rec updated and complete  Allergies reviewed  Pt was not sure the name and strength of his antibiotic, called [facility] @ [phone] to verify name and strength (KEFLEX 500MG), pt did finish course of antibiotic      Pt reports that he has not taken LISINOPRIL 5MG in over 6 months or longer      No current facility-administered medications on file prior to encounter.     Current Outpatient Medications on File Prior to Encounter   Medication Sig Dispense Refill   • cephALEXin (KEFLEX) 500 MG Cap Take 500 mg by mouth 4 times a day. Pt started on 11/29/2021 for 7 day course     • atorvastatin (LIPITOR) 80 MG tablet Take 1 tablet by mouth every evening. 90 tablet 3   • prasugrel (EFFIENT) 10 MG Tab Take 1 tablet by mouth every day. (Patient taking differently: Take 10 mg by mouth every evening.) 90 tablet 3   • aspirin EC 81 MG EC tablet Take 1 tablet by mouth every day. (Patient taking differently: Take 81 mg by mouth every evening.) 30 tablet 2   • Ascorbic Acid (VITAMIN C PO) Take 1 tablet by mouth every day.     • Cholecalciferol (D3 PO) Take 1 capsule by mouth every day.     • Cyanocobalamin (B-12 PO) Take 1 tablet by mouth every day.

## 2021-12-07 NOTE — ASSESSMENT & PLAN NOTE
- Stent placed to OM1 in July, is on DAPT with ASA and Effient  Okay to hold antiplatelets per cardiology/Dr. Jennings

## 2021-12-08 ENCOUNTER — ANESTHESIA (OUTPATIENT)
Dept: SURGERY | Facility: MEDICAL CENTER | Age: 71
End: 2021-12-08
Payer: MEDICARE

## 2021-12-08 ENCOUNTER — ANESTHESIA EVENT (OUTPATIENT)
Dept: SURGERY | Facility: MEDICAL CENTER | Age: 71
End: 2021-12-08
Payer: MEDICARE

## 2021-12-08 LAB
ANION GAP SERPL CALC-SCNC: 12 MMOL/L (ref 7–16)
BASOPHILS # BLD AUTO: 0.8 % (ref 0–1.8)
BASOPHILS # BLD: 0.04 K/UL (ref 0–0.12)
BUN SERPL-MCNC: 20 MG/DL (ref 8–22)
CALCIUM SERPL-MCNC: 8.8 MG/DL (ref 8.4–10.2)
CHLORIDE SERPL-SCNC: 106 MMOL/L (ref 96–112)
CO2 SERPL-SCNC: 22 MMOL/L (ref 20–33)
CREAT SERPL-MCNC: 1.06 MG/DL (ref 0.5–1.4)
EOSINOPHIL # BLD AUTO: 0.15 K/UL (ref 0–0.51)
EOSINOPHIL NFR BLD: 2.9 % (ref 0–6.9)
ERYTHROCYTE [DISTWIDTH] IN BLOOD BY AUTOMATED COUNT: 43.2 FL (ref 35.9–50)
GLUCOSE SERPL-MCNC: 73 MG/DL (ref 65–99)
HCT VFR BLD AUTO: 42.6 % (ref 42–52)
HGB BLD-MCNC: 14.6 G/DL (ref 14–18)
IMM GRANULOCYTES # BLD AUTO: 0.01 K/UL (ref 0–0.11)
IMM GRANULOCYTES NFR BLD AUTO: 0.2 % (ref 0–0.9)
LYMPHOCYTES # BLD AUTO: 1.81 K/UL (ref 1–4.8)
LYMPHOCYTES NFR BLD: 35.4 % (ref 22–41)
MCH RBC QN AUTO: 33.8 PG (ref 27–33)
MCHC RBC AUTO-ENTMCNC: 34.3 G/DL (ref 33.7–35.3)
MCV RBC AUTO: 98.6 FL (ref 81.4–97.8)
MONOCYTES # BLD AUTO: 0.58 K/UL (ref 0–0.85)
MONOCYTES NFR BLD AUTO: 11.3 % (ref 0–13.4)
NEUTROPHILS # BLD AUTO: 2.53 K/UL (ref 1.82–7.42)
NEUTROPHILS NFR BLD: 49.4 % (ref 44–72)
NRBC # BLD AUTO: 0 K/UL
NRBC BLD-RTO: 0 /100 WBC
PLATELET # BLD AUTO: 151 K/UL (ref 164–446)
PMV BLD AUTO: 9.7 FL (ref 9–12.9)
POTASSIUM SERPL-SCNC: 4.2 MMOL/L (ref 3.6–5.5)
RBC # BLD AUTO: 4.32 M/UL (ref 4.7–6.1)
SODIUM SERPL-SCNC: 140 MMOL/L (ref 135–145)
TSH SERPL DL<=0.005 MIU/L-ACNC: 1.88 UIU/ML (ref 0.38–5.33)
WBC # BLD AUTO: 5.1 K/UL (ref 4.8–10.8)

## 2021-12-08 PROCEDURE — 700101 HCHG RX REV CODE 250: Performed by: ANESTHESIOLOGY

## 2021-12-08 PROCEDURE — 501838 HCHG SUTURE GENERAL: Performed by: ORTHOPAEDIC SURGERY

## 2021-12-08 PROCEDURE — 160038 HCHG SURGERY MINUTES - EA ADDL 1 MIN LEVEL 2: Performed by: ORTHOPAEDIC SURGERY

## 2021-12-08 PROCEDURE — 80048 BASIC METABOLIC PNL TOTAL CA: CPT

## 2021-12-08 PROCEDURE — 160002 HCHG RECOVERY MINUTES (STAT): Performed by: ORTHOPAEDIC SURGERY

## 2021-12-08 PROCEDURE — G0378 HOSPITAL OBSERVATION PER HR: HCPCS

## 2021-12-08 PROCEDURE — 160027 HCHG SURGERY MINUTES - 1ST 30 MINS LEVEL 2: Performed by: ORTHOPAEDIC SURGERY

## 2021-12-08 PROCEDURE — 700105 HCHG RX REV CODE 258: Performed by: ORTHOPAEDIC SURGERY

## 2021-12-08 PROCEDURE — 84443 ASSAY THYROID STIM HORMONE: CPT

## 2021-12-08 PROCEDURE — 700111 HCHG RX REV CODE 636 W/ 250 OVERRIDE (IP): Performed by: ANESTHESIOLOGY

## 2021-12-08 PROCEDURE — 160048 HCHG OR STATISTICAL LEVEL 1-5: Performed by: ORTHOPAEDIC SURGERY

## 2021-12-08 PROCEDURE — 87070 CULTURE OTHR SPECIMN AEROBIC: CPT

## 2021-12-08 PROCEDURE — 94760 N-INVAS EAR/PLS OXIMETRY 1: CPT

## 2021-12-08 PROCEDURE — 700111 HCHG RX REV CODE 636 W/ 250 OVERRIDE (IP): Performed by: ORTHOPAEDIC SURGERY

## 2021-12-08 PROCEDURE — 99225 PR SUBSEQUENT OBSERVATION CARE,LEVEL II: CPT | Performed by: INTERNAL MEDICINE

## 2021-12-08 PROCEDURE — 87075 CULTR BACTERIA EXCEPT BLOOD: CPT

## 2021-12-08 PROCEDURE — 500881 HCHG PACK, EXTREMITY: Performed by: ORTHOPAEDIC SURGERY

## 2021-12-08 PROCEDURE — 700101 HCHG RX REV CODE 250: Performed by: ORTHOPAEDIC SURGERY

## 2021-12-08 PROCEDURE — A9270 NON-COVERED ITEM OR SERVICE: HCPCS | Performed by: FAMILY MEDICINE

## 2021-12-08 PROCEDURE — 700102 HCHG RX REV CODE 250 W/ 637 OVERRIDE(OP): Performed by: FAMILY MEDICINE

## 2021-12-08 PROCEDURE — 87205 SMEAR GRAM STAIN: CPT

## 2021-12-08 PROCEDURE — 96365 THER/PROPH/DIAG IV INF INIT: CPT

## 2021-12-08 PROCEDURE — 160036 HCHG PACU - EA ADDL 30 MINS PHASE I: Performed by: ORTHOPAEDIC SURGERY

## 2021-12-08 PROCEDURE — 160035 HCHG PACU - 1ST 60 MINS PHASE I: Performed by: ORTHOPAEDIC SURGERY

## 2021-12-08 PROCEDURE — 700105 HCHG RX REV CODE 258: Performed by: ANESTHESIOLOGY

## 2021-12-08 PROCEDURE — 36415 COLL VENOUS BLD VENIPUNCTURE: CPT

## 2021-12-08 PROCEDURE — 85025 COMPLETE CBC W/AUTO DIFF WBC: CPT

## 2021-12-08 PROCEDURE — 160009 HCHG ANES TIME/MIN: Performed by: ORTHOPAEDIC SURGERY

## 2021-12-08 RX ORDER — METOPROLOL TARTRATE 1 MG/ML
1 INJECTION, SOLUTION INTRAVENOUS
Status: DISCONTINUED | OUTPATIENT
Start: 2021-12-08 | End: 2021-12-08 | Stop reason: HOSPADM

## 2021-12-08 RX ORDER — OXYCODONE HCL 5 MG/5 ML
5 SOLUTION, ORAL ORAL
Status: DISCONTINUED | OUTPATIENT
Start: 2021-12-08 | End: 2021-12-08 | Stop reason: HOSPADM

## 2021-12-08 RX ORDER — HYDROMORPHONE HYDROCHLORIDE 1 MG/ML
0.2 INJECTION, SOLUTION INTRAMUSCULAR; INTRAVENOUS; SUBCUTANEOUS
Status: DISCONTINUED | OUTPATIENT
Start: 2021-12-08 | End: 2021-12-08 | Stop reason: HOSPADM

## 2021-12-08 RX ORDER — OXYCODONE HCL 5 MG/5 ML
10 SOLUTION, ORAL ORAL
Status: DISCONTINUED | OUTPATIENT
Start: 2021-12-08 | End: 2021-12-08 | Stop reason: HOSPADM

## 2021-12-08 RX ORDER — BUPIVACAINE HYDROCHLORIDE AND EPINEPHRINE 5; 5 MG/ML; UG/ML
INJECTION, SOLUTION EPIDURAL; INTRACAUDAL; PERINEURAL
Status: DISCONTINUED | OUTPATIENT
Start: 2021-12-08 | End: 2021-12-08 | Stop reason: HOSPADM

## 2021-12-08 RX ORDER — SODIUM CHLORIDE, SODIUM LACTATE, POTASSIUM CHLORIDE, CALCIUM CHLORIDE 600; 310; 30; 20 MG/100ML; MG/100ML; MG/100ML; MG/100ML
INJECTION, SOLUTION INTRAVENOUS CONTINUOUS
Status: DISCONTINUED | OUTPATIENT
Start: 2021-12-08 | End: 2021-12-08

## 2021-12-08 RX ORDER — DEXAMETHASONE SODIUM PHOSPHATE 4 MG/ML
INJECTION, SOLUTION INTRA-ARTICULAR; INTRALESIONAL; INTRAMUSCULAR; INTRAVENOUS; SOFT TISSUE PRN
Status: DISCONTINUED | OUTPATIENT
Start: 2021-12-08 | End: 2021-12-08 | Stop reason: SURG

## 2021-12-08 RX ORDER — SODIUM CHLORIDE, SODIUM LACTATE, POTASSIUM CHLORIDE, CALCIUM CHLORIDE 600; 310; 30; 20 MG/100ML; MG/100ML; MG/100ML; MG/100ML
INJECTION, SOLUTION INTRAVENOUS CONTINUOUS
Status: DISCONTINUED | OUTPATIENT
Start: 2021-12-08 | End: 2021-12-08 | Stop reason: HOSPADM

## 2021-12-08 RX ORDER — LIDOCAINE HYDROCHLORIDE 20 MG/ML
INJECTION, SOLUTION EPIDURAL; INFILTRATION; INTRACAUDAL; PERINEURAL PRN
Status: DISCONTINUED | OUTPATIENT
Start: 2021-12-08 | End: 2021-12-08 | Stop reason: SURG

## 2021-12-08 RX ORDER — CEFAZOLIN SODIUM IN 0.9 % NACL 2 G/100 ML
2 PLASTIC BAG, INJECTION (ML) INTRAVENOUS EVERY 8 HOURS
Status: COMPLETED | OUTPATIENT
Start: 2021-12-08 | End: 2021-12-09

## 2021-12-08 RX ORDER — DIPHENHYDRAMINE HYDROCHLORIDE 50 MG/ML
12.5 INJECTION INTRAMUSCULAR; INTRAVENOUS
Status: DISCONTINUED | OUTPATIENT
Start: 2021-12-08 | End: 2021-12-08 | Stop reason: HOSPADM

## 2021-12-08 RX ORDER — HYDROMORPHONE HYDROCHLORIDE 1 MG/ML
0.4 INJECTION, SOLUTION INTRAMUSCULAR; INTRAVENOUS; SUBCUTANEOUS
Status: DISCONTINUED | OUTPATIENT
Start: 2021-12-08 | End: 2021-12-08 | Stop reason: HOSPADM

## 2021-12-08 RX ORDER — CEFAZOLIN SODIUM 1 G/3ML
INJECTION, POWDER, FOR SOLUTION INTRAMUSCULAR; INTRAVENOUS PRN
Status: DISCONTINUED | OUTPATIENT
Start: 2021-12-08 | End: 2021-12-08 | Stop reason: SURG

## 2021-12-08 RX ORDER — SODIUM CHLORIDE, SODIUM LACTATE, POTASSIUM CHLORIDE, CALCIUM CHLORIDE 600; 310; 30; 20 MG/100ML; MG/100ML; MG/100ML; MG/100ML
INJECTION, SOLUTION INTRAVENOUS
Status: DISCONTINUED | OUTPATIENT
Start: 2021-12-08 | End: 2021-12-08 | Stop reason: SURG

## 2021-12-08 RX ORDER — ONDANSETRON 2 MG/ML
INJECTION INTRAMUSCULAR; INTRAVENOUS PRN
Status: DISCONTINUED | OUTPATIENT
Start: 2021-12-08 | End: 2021-12-08 | Stop reason: SURG

## 2021-12-08 RX ORDER — HALOPERIDOL 5 MG/ML
1 INJECTION INTRAMUSCULAR
Status: DISCONTINUED | OUTPATIENT
Start: 2021-12-08 | End: 2021-12-08 | Stop reason: HOSPADM

## 2021-12-08 RX ORDER — HYDROMORPHONE HYDROCHLORIDE 1 MG/ML
0.1 INJECTION, SOLUTION INTRAMUSCULAR; INTRAVENOUS; SUBCUTANEOUS
Status: DISCONTINUED | OUTPATIENT
Start: 2021-12-08 | End: 2021-12-08 | Stop reason: HOSPADM

## 2021-12-08 RX ORDER — LABETALOL HYDROCHLORIDE 5 MG/ML
5 INJECTION, SOLUTION INTRAVENOUS
Status: DISCONTINUED | OUTPATIENT
Start: 2021-12-08 | End: 2021-12-08 | Stop reason: HOSPADM

## 2021-12-08 RX ORDER — ONDANSETRON 2 MG/ML
4 INJECTION INTRAMUSCULAR; INTRAVENOUS
Status: DISCONTINUED | OUTPATIENT
Start: 2021-12-08 | End: 2021-12-08 | Stop reason: HOSPADM

## 2021-12-08 RX ORDER — HYDRALAZINE HYDROCHLORIDE 20 MG/ML
5 INJECTION INTRAMUSCULAR; INTRAVENOUS
Status: DISCONTINUED | OUTPATIENT
Start: 2021-12-08 | End: 2021-12-08 | Stop reason: HOSPADM

## 2021-12-08 RX ORDER — MIDAZOLAM HYDROCHLORIDE 1 MG/ML
INJECTION INTRAMUSCULAR; INTRAVENOUS PRN
Status: DISCONTINUED | OUTPATIENT
Start: 2021-12-08 | End: 2021-12-08 | Stop reason: SURG

## 2021-12-08 RX ADMIN — SODIUM CHLORIDE, POTASSIUM CHLORIDE, SODIUM LACTATE AND CALCIUM CHLORIDE: 600; 310; 30; 20 INJECTION, SOLUTION INTRAVENOUS at 12:12

## 2021-12-08 RX ADMIN — ATORVASTATIN CALCIUM 80 MG: 40 TABLET, FILM COATED ORAL at 16:54

## 2021-12-08 RX ADMIN — SODIUM CHLORIDE, POTASSIUM CHLORIDE, SODIUM LACTATE AND CALCIUM CHLORIDE: 600; 310; 30; 20 INJECTION, SOLUTION INTRAVENOUS at 11:57

## 2021-12-08 RX ADMIN — MIDAZOLAM HYDROCHLORIDE 2 MG: 1 INJECTION, SOLUTION INTRAMUSCULAR; INTRAVENOUS at 12:16

## 2021-12-08 RX ADMIN — EPHEDRINE SULFATE 10 MG: 50 INJECTION INTRAMUSCULAR; INTRAVENOUS; SUBCUTANEOUS at 12:22

## 2021-12-08 RX ADMIN — EPHEDRINE SULFATE 10 MG: 50 INJECTION INTRAMUSCULAR; INTRAVENOUS; SUBCUTANEOUS at 12:24

## 2021-12-08 RX ADMIN — FENTANYL CITRATE 100 MCG: 50 INJECTION, SOLUTION INTRAMUSCULAR; INTRAVENOUS at 12:19

## 2021-12-08 RX ADMIN — PROPOFOL 150 MG: 10 INJECTION, EMULSION INTRAVENOUS at 12:19

## 2021-12-08 RX ADMIN — ONDANSETRON 4 MG: 2 INJECTION INTRAMUSCULAR; INTRAVENOUS at 12:35

## 2021-12-08 RX ADMIN — LIDOCAINE HYDROCHLORIDE 70 MG: 20 INJECTION, SOLUTION EPIDURAL; INFILTRATION; INTRACAUDAL; PERINEURAL at 12:19

## 2021-12-08 RX ADMIN — ASPIRIN 81 MG: 81 TABLET, COATED ORAL at 16:54

## 2021-12-08 RX ADMIN — DEXAMETHASONE SODIUM PHOSPHATE 8 MG: 4 INJECTION, SOLUTION INTRAMUSCULAR; INTRAVENOUS at 12:19

## 2021-12-08 RX ADMIN — CEFAZOLIN 2 G: 330 INJECTION, POWDER, FOR SOLUTION INTRAMUSCULAR; INTRAVENOUS at 12:30

## 2021-12-08 RX ADMIN — CEFAZOLIN 2 G: 1 INJECTION, POWDER, FOR SOLUTION INTRAVENOUS at 20:38

## 2021-12-08 ASSESSMENT — ENCOUNTER SYMPTOMS
PHOTOPHOBIA: 0
HEADACHES: 0
CHILLS: 0
NAUSEA: 0
NERVOUS/ANXIOUS: 0
FLANK PAIN: 0
PALPITATIONS: 0
HALLUCINATIONS: 0
WEIGHT LOSS: 0
FEVER: 0
VOMITING: 0
HEMOPTYSIS: 0
FOCAL WEAKNESS: 0
BRUISES/BLEEDS EASILY: 0
HEARTBURN: 0
COUGH: 0
BACK PAIN: 0
POLYDIPSIA: 0
BLURRED VISION: 0
SPEECH CHANGE: 0
TREMORS: 0
SPUTUM PRODUCTION: 0
ORTHOPNEA: 0
DOUBLE VISION: 0
NECK PAIN: 0

## 2021-12-08 ASSESSMENT — PAIN DESCRIPTION - PAIN TYPE
TYPE: SURGICAL PAIN

## 2021-12-08 ASSESSMENT — LIFESTYLE VARIABLES: SUBSTANCE_ABUSE: 0

## 2021-12-08 ASSESSMENT — PAIN SCALES - GENERAL: PAIN_LEVEL: 0

## 2021-12-08 NOTE — ANESTHESIA POSTPROCEDURE EVALUATION
Patient: Vasile Bowers    Procedure Summary     Date: 12/08/21 Room / Location:  OR  / SURGERY [facility]    Anesthesia Start: 1212 Anesthesia Stop: 1249    Procedure: IRRIGATION AND DEBRIDEMENT, WOUND - HAND (Left Hand) Diagnosis: (abscess over the left thumb and the volar MP joint crease)    Surgeons: Guillermo Villalobos M.D. Responsible Provider: Kevin Hills D.O.    Anesthesia Type: general ASA Status: 3          Final Anesthesia Type: general  Last vitals  BP   Blood Pressure : 109/65, NIBP: 142/87    Temp   36.2 °C (97.2 °F)    Pulse   (!) 59   Resp   17    SpO2   100 %      Anesthesia Post Evaluation    Patient location during evaluation: PACU  Patient participation: complete - patient participated  Level of consciousness: awake and alert  Pain score: 0    Airway patency: patent  Anesthetic complications: no  Cardiovascular status: hemodynamically stable  Respiratory status: acceptable  Hydration status: euvolemic    PONV: none          No complications documented.     Nurse Pain Score: 0 (NPRS)

## 2021-12-08 NOTE — PROGRESS NOTES
4 Eyes Skin Assessment Completed by Stefanie Hanna, ALEXSANDRA and Lore HARDING RN.    Head WDL  Ears WDL  Nose WDL  Mouth WDL  Neck WDL  Breast/Chest WDL  Shoulder Blades WDL  Spine WDL  (R) Arm/Elbow/Hand WDL  (L) Arm/Elbow/Hand Swellingin hand  Abdomen WDL  Groin WDL  Scrotum/Coccyx/Buttocks WDL  (R) Leg WDL  (L) Leg WDL  (R) Heel/Foot/Toe WDL  (L) Heel/Foot/Toe WDL          Devices In Places Tele Box      Interventions In Place N/A    Possible Skin Injury No    Pictures Uploaded Into Epic N/A  Wound Consult Placed N/A  RN Wound Prevention Protocol Ordered No

## 2021-12-08 NOTE — ANESTHESIA PROCEDURE NOTES
Airway    Date/Time: 12/8/2021 12:19 PM  Performed by: Kevin Hills D.O.  Authorized by: Kevin Hills D.O.     Location:  OR  Urgency:  Elective  Indications for Airway Management:  Anesthesia      Spontaneous Ventilation: absent    Sedation Level:  Deep  Preoxygenated: Yes    Mask Difficulty Assessment:  0 - not attempted  Final Airway Type:  Supraglottic airway  Final Supraglottic Airway:  Standard LMA    SGA Size:  4  Number of Attempts at Approach:  1

## 2021-12-08 NOTE — PROGRESS NOTES
Telemetry Shift Summary    Rhythm SB  HR Range 54 touchdown to 41  Ectopy rPVC  Measurements 0.16/0.10/0.46        Normal Values  Rhythm SR  HR Range    Measurements 0.12-0.20 / 0.06-0.10  / 0.30-0.52

## 2021-12-08 NOTE — OR NURSING
1312 Report received from Sammie UPTON.    1327 Called ALEXSANDRA Foster to give report. Kristin to call back for report.     1353 Pt met criteria for transfer back to inpatient room 211-1 via [facility]. Pt denies any pain. Johan po well, no c/o n/v. Dressing to left hand CDI. Ciara telemetry tech stated signal received. Tele box in place for transfer.

## 2021-12-08 NOTE — DISCHARGE PLANNING
ER CM met pt in ER. AO x4. Pleasant. Lives with spouse in 1 story home . Ambulatory and independent. RX Carmen das. PCP Dr Clayton. Home 1 story. Anticipated for or eval of hand  Care Transition Team Assessment    Information Source  Orientation Level: Oriented X4  Information Given By: Patient  Informant's Name: Vasile  Who is responsible for making decisions for patient? : Patient         Elopement Risk  Legal Hold: No  Ambulatory or Self Mobile in Wheelchair: No-Not an Elopement Risk  Elopement Risk: Not at Risk for Elopement    Interdisciplinary Discharge Planning  Primary Care Physician: DR Clayton  Lives with - Patient's Self Care Capacity: Spouse  Patient or legal guardian wants to designate a caregiver: No  Support Systems: Spouse / Significant Other  Housing / Facility: [address]  Do You Take your Prescribed Medications Regularly: Yes  Mobility Issues: No  Assistance Needed: No    Discharge Preparedness  What is your plan after discharge?: Home with help  What are your discharge supports?: Spouse         Finances  Prescription Coverage: Yes (carmen)    Vision / Hearing Impairment  Vision Impairment : No  Hearing Impairment : No              Domestic Abuse  Have you ever been the victim of abuse or violence?: No  Physical Abuse or Sexual Abuse: No  Verbal Abuse or Emotional Abuse: No  Possible Abuse/Neglect Reported to:: Not Applicable

## 2021-12-08 NOTE — PROGRESS NOTES
Received report from Romana UPTON 1339.Pt to floor via gurney at  Pt is awake and alert. No signs of distress. Pt reports numbness to left index finger and l thumb. Pt denies any numbness or tingling at this time. Pt denies any pain at this time. Pt is able to planter flex and dorsi flex ble, + pulses. Dressing to l hand is cdi. ice in place to l hand. lue elevated. fall precautions in place. Bed in lowest and locked position. Call light within reach.

## 2021-12-08 NOTE — CARE PLAN
The patient is Stable - Low risk of patient condition declining or worsening    Shift Goals  Clinical Goals: NPO   Patient Goals: D/C     Progress made toward(s) clinical / shift goals:    Problem: Knowledge Deficit - Standard  Goal: Patient and family/care givers will demonstrate understanding of plan of care, disease process/condition, diagnostic tests and medications  Outcome: Progressing     Problem: Communication  Goal: The ability to communicate needs accurately and effectively will improve  Outcome: Progressing     Problem: Discharge Barriers/Planning  Goal: Patient's continuum of care needs are met  Outcome: Progressing    Pt is aware of his NPO status at midnight. RN discussed POC with pt and time of procedure for this afternoon. PT verbalized understanding and has no needs.        Patient is not progressing towards the following goals:

## 2021-12-08 NOTE — PROGRESS NOTES
Hospital Medicine Daily Progress Note    Date of Service  12/8/2021    Chief Complaint/Hospital Course  Vasile Bowers is a 71 y.o. male admitted 12/7/2021 with left hand abscess          Interval Problem Update  Patient denies any pain at this moment.  He is awaiting for I&D by Dr. Mcfadden    I have personally seen and examined the patient at bedside. I discussed the plan of care with patient.    Consultants/Specialty  orthopedics    Code Status  Full Code    Disposition  Patient is not medically cleared.   Anticipate discharge to to home with close outpatient follow-up.  I have placed the appropriate orders for post-discharge needs.    Review of Systems  Review of Systems   Constitutional: Negative for chills, fever and weight loss.   HENT: Negative for ear pain, hearing loss and tinnitus.    Eyes: Negative for blurred vision, double vision and photophobia.   Respiratory: Negative for cough, hemoptysis and sputum production.    Cardiovascular: Negative for chest pain, palpitations and orthopnea.   Gastrointestinal: Negative for heartburn, nausea and vomiting.   Genitourinary: Negative for dysuria, flank pain, frequency and hematuria.   Musculoskeletal: Positive for joint pain. Negative for back pain and neck pain.   Skin: Negative for itching and rash.   Neurological: Negative for tremors, speech change, focal weakness and headaches.   Endo/Heme/Allergies: Negative for environmental allergies and polydipsia. Does not bruise/bleed easily.   Psychiatric/Behavioral: Negative for hallucinations and substance abuse. The patient is not nervous/anxious.         Physical Exam  Temp:  [36.4 °C (97.6 °F)-36.6 °C (97.9 °F)] 36.6 °C (97.9 °F)  Pulse:  [43-85] 47  Resp:  [14-16] 14  BP: (115-142)/(60-83) 129/82  SpO2:  [98 %-100 %] 100 %    Physical Exam  Vitals and nursing note reviewed.   Constitutional:       General: He is not in acute distress.     Appearance: Normal appearance.   HENT:      Head: Normocephalic  and atraumatic.      Nose: Nose normal.      Mouth/Throat:      Mouth: Mucous membranes are moist.   Eyes:      Extraocular Movements: Extraocular movements intact.      Pupils: Pupils are equal, round, and reactive to light.   Cardiovascular:      Rate and Rhythm: Normal rate and regular rhythm.   Pulmonary:      Effort: Pulmonary effort is normal.      Breath sounds: Normal breath sounds.   Abdominal:      General: Abdomen is flat. There is no distension.      Tenderness: There is no abdominal tenderness.   Musculoskeletal:         General: No swelling or deformity. Normal range of motion.      Left hand: Swelling and tenderness present.      Cervical back: Normal range of motion and neck supple.   Skin:     General: Skin is warm and dry.   Neurological:      General: No focal deficit present.      Mental Status: He is alert and oriented to person, place, and time.   Psychiatric:         Mood and Affect: Mood normal.         Behavior: Behavior normal.         Fluids  No intake or output data in the 24 hours ending 12/08/21 1133    Laboratory  Recent Labs     12/07/21  1022 12/08/21  0223   WBC 4.7* 5.1   RBC 4.64* 4.32*   HEMOGLOBIN 15.5 14.6   HEMATOCRIT 45.4 42.6   MCV 97.8 98.6*   MCH 33.4* 33.8*   MCHC 34.1 34.3   RDW 43.3 43.2   PLATELETCT 158* 151*   MPV 9.6 9.7     Recent Labs     12/07/21  0942 12/07/21  1022 12/08/21  0223   SODIUM 142 141 140   POTASSIUM 4.8 4.2 4.2   CHLORIDE 106 105 106   CO2 28 26 22   GLUCOSE 93 85 73   BUN 18 18 20   CREATININE 1.02 0.99 1.06   CALCIUM 9.4 9.2 8.8             Recent Labs     12/07/21  0942   TRIGLYCERIDE 27   HDL 78   LDL 45       Imaging  CT-EXTREMITY, UPPER WITH LEFT   Final Result      1.  No evidence of fracture or bony destructive change      2.  Soft tissue abscess located within the web between the first and second digits and extending into the thenar area. This is multiloculated in nature with the largest component located volarly within the thenar area  measuring 1 cm in size. The volar    area does contain a linear slightly dense focus which may represent a retained splinter.      3.   Cellulitis.      4.  Multifocal osteoarthritis.           Assessment/Plan  * Hand abscess- (present on admission)  Assessment & Plan  - Pt had a large splinter to his L hand two weeks ago that he removed himself, failed a 7d course of Keflex after being seen in [place]  - CT with soft tissue abscess located within the web between the first and second digits and extending into the thenar area. This is multiloculated in nature with the largest component located volarly within the thenar area measuring 1 cm in size. The volar area does contain a linear slightly dense focus which may represent a retained splinter.  - Dr Ochoa to take pt to the OR ;  no antibiotics for now  - NPO for now  Pain control as needed      Bradycardia  Assessment & Plan  - Asymptomatic  - Monitor       Essential hypertension, benign- (present on admission)  Assessment & Plan  - Per notes in July, pt was on Coreg and Lisinopril at that time - he states he no longer takes those     Coronary artery disease involving native coronary artery of native heart without angina pectoris- (present on admission)  Assessment & Plan  - Stent placed to OM1 in July, is on DAPT with ASA and Effient  Okay to hold antiplatelets per cardiology/Dr. Jennings         VTE prophylaxis: enoxaparin ppx    I have performed a physical exam and reviewed and updated ROS and Plan today (12/8/2021). In review of yesterday's note (12/7/2021), there are no changes except as documented above.

## 2021-12-08 NOTE — PROGRESS NOTES
Received report from night shift RN. Pt is awake and alert. No signs of distress. Pt denies any nausea. Pt denies any numbness or tingling. Pt denies any pain at this time. fall precautions in place. Bed in lowest and locked position. Call light within reach. Pt educated to call when in need of assistance. Pt verbalized understanding.

## 2021-12-08 NOTE — ANESTHESIA TIME REPORT
Anesthesia Start and Stop Event Times     Date Time Event    12/8/2021 1205 Ready for Procedure     1212 Anesthesia Start     1249 Anesthesia Stop        Responsible Staff  12/08/21    Name Role Begin End    Kevin Hills D.O. Anesth 1212 1249        Preop Diagnosis (Free Text):  Pre-op Diagnosis     abscess over the left thumb and the volar MP joint crease        Preop Diagnosis (Codes):    Premium Reason  Non-Premium    Comments:

## 2021-12-08 NOTE — PROGRESS NOTES
Telemetry Shift Summary     Rhythm SB, SR  HR Range 47-83  Ectopy  rare PVCs, rare PACs  Measurements .20/.10/.44              Normal Values  Rhythm SR  HR Range    Measurements 0.12-0.20 / 0.06-0.10  / 0.30-0.52

## 2021-12-08 NOTE — ANESTHESIA PREPROCEDURE EVALUATION
Case: [id number] Date/Time: 12/08/21 1200    Procedure: IRRIGATION AND DEBRIDEMENT, WOUND - HAND (Left Hand)    Anesthesia type: General    Location:  OR  / SURGERY [facility]    Surgeons: Guillermo Villalobos M.D.          Relevant Problems   CARDIAC   (positive) Ascending aorta dilation (HCC)   (positive) Coronary artery disease involving native coronary artery of native heart without angina pectoris   (positive) Essential hypertension, benign   (positive) NSTEMI (non-ST elevated myocardial infarction) (HCC)      Other   (positive) Bursitis, knee   (positive) Patellar tendinitis       Physical Exam    Airway   Mallampati: II  TM distance: >3 FB  Neck ROM: full       Cardiovascular - normal exam  Rhythm: regular  Rate: normal  (-) murmur     Dental - normal exam           Pulmonary - normal exam  Breath sounds clear to auscultation     Abdominal    Neurological - normal exam                 Anesthesia Plan    ASA 3   ASA physical status 3 criteria: CAD/stents (> 3 months)    Plan - general       Airway plan will be LMA          Induction: intravenous    Postoperative Plan: Postoperative administration of opioids is intended.    Pertinent diagnostic labs and testing reviewed    Informed Consent:    Anesthetic plan and risks discussed with patient.    Use of blood products discussed with: patient whom consented to blood products.

## 2021-12-08 NOTE — CONSULTS
Orthopaedic Surgery Consult Note:    Guillermo Villalobos M.D.  Date & Time note created:    12/8/2021   12:08 PM       Patient ID:   Name:             Vasile Bowers   YOB: 1950  Age:                 71 y.o.  male   MRN:               [id number]                                                             Reason for Consult:      Left hand abscess    History of Present Illness:    Vasile presented to the emergency room yesterday for an increasing abscess over the left thumb and the volar MP joint crease. He had a sliver that pierced the area 3 weeks ago. He states he has had increasing swelling and edema throughout the hand. He denies fevers at home. He has pain around this area as well. CT scan obtained at the ER reveals an abscess in the area.    Review of Systems:      Constitutional: Denies fevers, Denies weight changes  Eyes: Denies changes in vision, no eye pain  Ears/Nose/Throat/Mouth: Denies nasal congestion or sore throat   Cardiovascular: Denies chest pain   Respiratory: Denies shortness of breath , Denies cough  Gastrointestinal/Hepatic: Denies abdominal pain, nausea, vomiting, diarrhea, constipation or GI bleeding   Genitourinary: Denies dysuria or frequency  Musculoskeletal/Rheum: Left hand pain  Skin: Denies rash  Neurological: Denies headache, confusion, memory loss or focal weakness/parasthesias  Psychiatric: denies mood disorder   Endocrine: Kinsey thyroid problems  Heme/Oncology/Lymph Nodes: Denies enlarged lymph nodes, denies brusing or known bleeding disorder  All other systems were reviewed and are negative (AMA/CMS criteria)                Past Medical History:   Past Medical History:   Diagnosis Date   • Ascending aorta dilation (HCC)    • CAD (coronary artery disease)      Active Hospital Problems    Diagnosis    • Hand abscess [L02.519]    • Bradycardia [R00.1]    • Coronary artery disease involving native coronary artery of native heart without angina pectoris [I25.10]     • Essential hypertension, benign [I10]        Past Surgical History:  Past Surgical History:   Procedure Laterality Date   • ZZZ CARDIAC CATH  07/2021    PCI to OM1    • KNEE ARTHROSCOPY     • SHOULDER ARTHROSCOPY         Hospital Medications:    Current Facility-Administered Medications:   •  lactated ringers infusion, , Intravenous, Continuous, Guillermo Villalobos M.D., Last Rate: 10 mL/hr at 12/08/21 1157, New Bag at 12/08/21 1157  •  [MAR Hold] aspirin EC (ECOTRIN) tablet 81 mg, 81 mg, Oral, Q EVENING, Caroline White M.D., 81 mg at 12/07/21 1754  •  [MAR Hold] atorvastatin (LIPITOR) tablet 80 mg, 80 mg, Oral, Q EVENING, Caroline White M.D., 80 mg at 12/07/21 1754  •  [MAR Hold] senna-docusate (PERICOLACE or SENOKOT S) 8.6-50 MG per tablet 2 Tablet, 2 Tablet, Oral, BID **AND** [MAR Hold] polyethylene glycol/lytes (MIRALAX) PACKET 1 Packet, 1 Packet, Oral, QDAY PRN **AND** [MAR Hold] magnesium hydroxide (MILK OF MAGNESIA) suspension 30 mL, 30 mL, Oral, QDAY PRN **AND** [MAR Hold] bisacodyl (DULCOLAX) suppository 10 mg, 10 mg, Rectal, QDAY PRN, Caroline White M.D.  •  [MAR Hold] acetaminophen (Tylenol) tablet 650 mg, 650 mg, Oral, Q6HRS PRN, Caroline White M.D.  •  Notify provider if pain remains uncontrolled, , , CONTINUOUS **AND** Use the Numeric Rating Scale (NRS), Garcia-Baker Faces (WBF), or FLACC on regular floors and Critical-Care Pain Observation Tool (CPOT) on ICUs/Trauma to assess pain, , , CONTINUOUS **AND** Pulse Ox, , , CONTINUOUS **AND** [MAR Hold] Pharmacy Consult Request ...Pain Management Review 1 Each, 1 Each, Other, PHARMACY TO DOSE **AND** If patient difficult to arouse and/or has respiratory depression (respiratory rate of 10 or less), stop any opiates that are currently infusing and call a Rapid Response., , , CONTINUOUS, Caroline White M.D.  •  [MAR Hold] oxyCODONE immediate-release (ROXICODONE) tablet 5 mg, 5 mg, Oral, Q3HRS PRN **OR** [MAR Hold] oxyCODONE immediate release  (ROXICODONE) tablet 10 mg, 10 mg, Oral, Q3HRS PRN **OR** [MAR Hold] morphine 4 mg/mL injection 4 mg, 4 mg, Intravenous, Q3HRS PRN, Caroline White M.D.    Current Outpatient Medications:  Medications Prior to Admission   Medication Sig Dispense Refill Last Dose   • cephALEXin (KEFLEX) 500 MG Cap Take 500 mg by mouth 4 times a day. Pt started on 11/29/2021 for 7 day course   12/5/2021 at FINISHED   • atorvastatin (LIPITOR) 80 MG tablet Take 1 tablet by mouth every evening. 90 tablet 3 12/6/2021 at 1700   • prasugrel (EFFIENT) 10 MG Tab Take 1 tablet by mouth every day. (Patient taking differently: Take 10 mg by mouth every evening.) 90 tablet 3 12/6/2021 at 1700   • aspirin EC 81 MG EC tablet Take 1 tablet by mouth every day. (Patient taking differently: Take 81 mg by mouth every evening.) 30 tablet 2 12/6/2021 at 1700   • Ascorbic Acid (VITAMIN C PO) Take 1 tablet by mouth every day.   12/6/2021 at 0900   • Cholecalciferol (D3 PO) Take 1 capsule by mouth every day.   12/6/2021 at 0900   • Cyanocobalamin (B-12 PO) Take 1 tablet by mouth every day.   12/6/2021 at 0900       Medication Allergy:  No Known Allergies    Family History:  Family History   Problem Relation Age of Onset   • Heart Disease Mother    • Heart Failure Mother        Social History:  Social History     Socioeconomic History   • Marital status:      Spouse name: Not on file   • Number of children: Not on file   • Years of education: Not on file   • Highest education level: Not on file   Occupational History   • Not on file   Tobacco Use   • Smoking status: Former Smoker   • Smokeless tobacco: Never Used   Vaping Use   • Vaping Use: Never used   Substance and Sexual Activity   • Alcohol use: Not Currently   • Drug use: No   • Sexual activity: Not on file   Other Topics Concern   • Not on file   Social History Narrative   • Not on file     Social Determinants of Health     Financial Resource Strain:    • Difficulty of Paying Living Expenses: Not  "on file   Food Insecurity:    • Worried About Running Out of Food in the Last Year: Not on file   • Ran Out of Food in the Last Year: Not on file   Transportation Needs:    • Lack of Transportation (Medical): Not on file   • Lack of Transportation (Non-Medical): Not on file   Physical Activity:    • Days of Exercise per Week: Not on file   • Minutes of Exercise per Session: Not on file   Stress:    • Feeling of Stress : Not on file   Social Connections:    • Frequency of Communication with Friends and Family: Not on file   • Frequency of Social Gatherings with Friends and Family: Not on file   • Attends [employer] Services: Not on file   • Active Member of Clubs or Organizations: Not on file   • Attends Club or Organization Meetings: Not on file   • Marital Status: Not on file   Intimate Partner Violence:    • Fear of Current or Ex-Partner: Not on file   • Emotionally Abused: Not on file   • Physically Abused: Not on file   • Sexually Abused: Not on file   Housing Stability:    • Unable to Pay for Housing in the Last Year: Not on file   • Number of Places Lived in the Last Year: Not on file   • Unstable Housing in the Last Year: Not on file         Physical Exam:  Vitals/ General Appearance:   Weight/BMI: Body mass index is 24.33 kg/m².  /82   Pulse (!) 47   Temp 36.6 °C (97.9 °F) (Oral)   Resp 14   Ht 1.727 m (5' 8\")   Wt 72.6 kg (160 lb)   SpO2 100%   Vitals:    12/07/21 1500 12/07/21 2232 12/08/21 0813 12/08/21 0900   BP: 142/83 115/60  129/82   Pulse: (!) 43 85 (!) 58 (!) 47   Resp: 14 16 14 14   Temp: 36.4 °C (97.6 °F) 36.5 °C (97.7 °F)  36.6 °C (97.9 °F)   TempSrc: Oral Oral  Oral   SpO2: 100% 99% 98% 100%   Weight: 72.6 kg (160 lb)      Height: 1.727 m (5' 8\")          Constitutional:   Well developed, Well nourished, No acute distress  HENMT:  Normocephalic, Atraumatic, Oropharynx moist mucous membranes, No oral exudates, Nose normal.  No thyromegaly.  Eyes:  EOMI, Conjunctiva normal, No " discharge.  Neck:  Normal range of motion, No cervical tenderness,  no JVD.  Cardiovascular:  Regular rate and rhythm  Lungs:  Normal breathing  Abdomen: Soft, non-tender, non-distended.  Skin: Warm, Dry, No erythema, No rash, no induration.  Neurologic: Alert & oriented x 3, No focal deficits noted, cranial nerves II through X are grossly intact.  Psychiatric: Affect normal, Judgment normal, Mood normal.  Musculoskeletal: Examination left hand reveals a mass over the volar aspect of the thumb MP joint crease. This area is tender to palpation. There is no erythema and minimal calor. It is consistent with an abscess. There is no drainage or open wound. He does have some pain with flexion and extension of the thumb. Sensory is intact to radial, median and ulnar nerve distributions.    Lab Data Review:  Recent Results (from the past 24 hour(s))   SARS-COV Antigen BLANCA: Collect dry nasal swab    Collection Time: 12/07/21  1:32 PM   Result Value Ref Range    SARS-CoV-2 Source NP Swab     SARS-COV ANTIGEN BLANCA NotDetected NotDetected   CBC with Differential    Collection Time: 12/08/21  2:23 AM   Result Value Ref Range    WBC 5.1 4.8 - 10.8 K/uL    RBC 4.32 (L) 4.70 - 6.10 M/uL    Hemoglobin 14.6 14.0 - 18.0 g/dL    Hematocrit 42.6 42.0 - 52.0 %    MCV 98.6 (H) 81.4 - 97.8 fL    MCH 33.8 (H) 27.0 - 33.0 pg    MCHC 34.3 33.7 - 35.3 g/dL    RDW 43.2 35.9 - 50.0 fL    Platelet Count 151 (L) 164 - 446 K/uL    MPV 9.7 9.0 - 12.9 fL    Neutrophils-Polys 49.40 44.00 - 72.00 %    Lymphocytes 35.40 22.00 - 41.00 %    Monocytes 11.30 0.00 - 13.40 %    Eosinophils 2.90 0.00 - 6.90 %    Basophils 0.80 0.00 - 1.80 %    Immature Granulocytes 0.20 0.00 - 0.90 %    Nucleated RBC 0.00 /100 WBC    Neutrophils (Absolute) 2.53 1.82 - 7.42 K/uL    Lymphs (Absolute) 1.81 1.00 - 4.80 K/uL    Monos (Absolute) 0.58 0.00 - 0.85 K/uL    Eos (Absolute) 0.15 0.00 - 0.51 K/uL    Baso (Absolute) 0.04 0.00 - 0.12 K/uL    Immature Granulocytes (abs) 0.01  0.00 - 0.11 K/uL    NRBC (Absolute) 0.00 K/uL   Basic Metabolic Panel (BMP)    Collection Time: 12/08/21  2:23 AM   Result Value Ref Range    Sodium 140 135 - 145 mmol/L    Potassium 4.2 3.6 - 5.5 mmol/L    Chloride 106 96 - 112 mmol/L    Co2 22 20 - 33 mmol/L    Glucose 73 65 - 99 mg/dL    Bun 20 8 - 22 mg/dL    Creatinine 1.06 0.50 - 1.40 mg/dL    Calcium 8.8 8.4 - 10.2 mg/dL    Anion Gap 12.0 7.0 - 16.0   TSH WITH REFLEX TO FT4    Collection Time: 12/08/21  2:23 AM   Result Value Ref Range    TSH 1.880 0.380 - 5.330 uIU/mL   ESTIMATED GFR    Collection Time: 12/08/21  2:23 AM   Result Value Ref Range    GFR If African American >60 >60 mL/min/1.73 m 2    GFR If Non African American >60 >60 mL/min/1.73 m 2       Imaging: CT scan reveals left volar thumb abscess    Assessment: Left volar thumb abscess    Plan: To the OR for incision and drainage of left thumb volar abscess  Informed consent was obtained with risks and benefits of the procedure explained and all questions answered. He wishes to proceed with the surgery. The proper site was marked.

## 2021-12-08 NOTE — CONSULTS
Brief Cardiology Note:    I was called to discuss this patients care with Dr. Blakely. We discussed patient presents with hand abscess requiring surgery had stenting in July he can safely hold his antiplatelet as necessary for surgery and resume when able from a bleeding perspective    At this time it was deemed no formal in person cardiology consultation was necessary, however if this changes due to changes in patient condition or abnormal test results, please re-consult cardiology.     Electronically signed: Chemo Jennings MD PhD [facility]  Cardiologist [facility]    Please note that this dictation was created using voice recognition software. There are errors of grammar and possibly content I did not discover before finalizing the note.     12/7/2021  5:28 PM

## 2021-12-08 NOTE — OR NURSING
1247 Report received from anesthesia and Danna UPTON. Oral airway in place, on 6L of O2 via mask.  Dressing to left hand CDI.     1310 Oral airway dc'd.  Pt denies any pain or nausea.     1312 Report given to Dejah UPTON.

## 2021-12-09 VITALS
HEIGHT: 68 IN | TEMPERATURE: 98.3 F | SYSTOLIC BLOOD PRESSURE: 93 MMHG | DIASTOLIC BLOOD PRESSURE: 64 MMHG | WEIGHT: 160 LBS | BODY MASS INDEX: 24.25 KG/M2 | OXYGEN SATURATION: 91 % | HEART RATE: 60 BPM | RESPIRATION RATE: 18 BRPM

## 2021-12-09 LAB
ALBUMIN SERPL BCP-MCNC: 3.8 G/DL (ref 3.2–4.9)
ALBUMIN/GLOB SERPL: 1.7 G/DL
ALP SERPL-CCNC: 74 U/L (ref 30–99)
ALT SERPL-CCNC: 21 U/L (ref 2–50)
ANION GAP SERPL CALC-SCNC: 9 MMOL/L (ref 7–16)
AST SERPL-CCNC: 18 U/L (ref 12–45)
BASOPHILS # BLD AUTO: 0.1 % (ref 0–1.8)
BASOPHILS # BLD: 0.01 K/UL (ref 0–0.12)
BILIRUB SERPL-MCNC: 0.3 MG/DL (ref 0.1–1.5)
BUN SERPL-MCNC: 20 MG/DL (ref 8–22)
CALCIUM SERPL-MCNC: 9 MG/DL (ref 8.4–10.2)
CHLORIDE SERPL-SCNC: 106 MMOL/L (ref 96–112)
CO2 SERPL-SCNC: 24 MMOL/L (ref 20–33)
CREAT SERPL-MCNC: 1.03 MG/DL (ref 0.5–1.4)
EOSINOPHIL # BLD AUTO: 0 K/UL (ref 0–0.51)
EOSINOPHIL NFR BLD: 0 % (ref 0–6.9)
ERYTHROCYTE [DISTWIDTH] IN BLOOD BY AUTOMATED COUNT: 42.4 FL (ref 35.9–50)
GLOBULIN SER CALC-MCNC: 2.2 G/DL (ref 1.9–3.5)
GLUCOSE SERPL-MCNC: 117 MG/DL (ref 65–99)
GRAM STN SPEC: NORMAL
HCT VFR BLD AUTO: 40.9 % (ref 42–52)
HGB BLD-MCNC: 13.9 G/DL (ref 14–18)
IMM GRANULOCYTES # BLD AUTO: 0.03 K/UL (ref 0–0.11)
IMM GRANULOCYTES NFR BLD AUTO: 0.3 % (ref 0–0.9)
LYMPHOCYTES # BLD AUTO: 0.7 K/UL (ref 1–4.8)
LYMPHOCYTES NFR BLD: 8.1 % (ref 22–41)
MCH RBC QN AUTO: 33.1 PG (ref 27–33)
MCHC RBC AUTO-ENTMCNC: 34 G/DL (ref 33.7–35.3)
MCV RBC AUTO: 97.4 FL (ref 81.4–97.8)
MONOCYTES # BLD AUTO: 0.57 K/UL (ref 0–0.85)
MONOCYTES NFR BLD AUTO: 6.6 % (ref 0–13.4)
NEUTROPHILS # BLD AUTO: 7.35 K/UL (ref 1.82–7.42)
NEUTROPHILS NFR BLD: 84.9 % (ref 44–72)
NRBC # BLD AUTO: 0 K/UL
NRBC BLD-RTO: 0 /100 WBC
PLATELET # BLD AUTO: 156 K/UL (ref 164–446)
PMV BLD AUTO: 9.7 FL (ref 9–12.9)
POTASSIUM SERPL-SCNC: 4.2 MMOL/L (ref 3.6–5.5)
PROT SERPL-MCNC: 6 G/DL (ref 6–8.2)
RBC # BLD AUTO: 4.2 M/UL (ref 4.7–6.1)
SIGNIFICANT IND 70042: NORMAL
SITE SITE: NORMAL
SODIUM SERPL-SCNC: 139 MMOL/L (ref 135–145)
SOURCE SOURCE: NORMAL
WBC # BLD AUTO: 8.7 K/UL (ref 4.8–10.8)

## 2021-12-09 PROCEDURE — G0378 HOSPITAL OBSERVATION PER HR: HCPCS

## 2021-12-09 PROCEDURE — 700111 HCHG RX REV CODE 636 W/ 250 OVERRIDE (IP): Performed by: ORTHOPAEDIC SURGERY

## 2021-12-09 PROCEDURE — 85025 COMPLETE CBC W/AUTO DIFF WBC: CPT

## 2021-12-09 PROCEDURE — 36415 COLL VENOUS BLD VENIPUNCTURE: CPT

## 2021-12-09 PROCEDURE — 80053 COMPREHEN METABOLIC PANEL: CPT

## 2021-12-09 PROCEDURE — 99217 PR OBSERVATION CARE DISCHARGE: CPT | Performed by: INTERNAL MEDICINE

## 2021-12-09 RX ORDER — SULFAMETHOXAZOLE AND TRIMETHOPRIM 800; 160 MG/1; MG/1
1 TABLET ORAL 2 TIMES DAILY
Qty: 20 TABLET | Refills: 0 | Status: SHIPPED | OUTPATIENT
Start: 2021-12-09 | End: 2021-12-19

## 2021-12-09 RX ADMIN — CEFAZOLIN 2 G: 1 INJECTION, POWDER, FOR SOLUTION INTRAVENOUS at 06:24

## 2021-12-09 ASSESSMENT — PAIN DESCRIPTION - PAIN TYPE
TYPE: SURGICAL PAIN
TYPE: SURGICAL PAIN

## 2021-12-09 NOTE — CARE PLAN
The patient is Stable - Low risk of patient condition declining or worsening    Shift Goals  Clinical Goals: safety  Patient Goals: D/C     Progress made toward(s) clinical / shift goals:  pt remained free from falls during shift. Fall precautions in place. Bed in locked and lowest position. Call light within reach.    Patient is not progressing towards the following goals:

## 2021-12-09 NOTE — PROGRESS NOTES
Telemetry Shift Summary    Rhythm SB/SR  HR Range 47-69  Ectopy rPVC, PAC  Measurements 0.20/0.10/0.42        Normal Values  Rhythm SR  HR Range    Measurements 0.12-0.20 / 0.06-0.10  / 0.30-0.52

## 2021-12-09 NOTE — PROGRESS NOTES
Discharge paperwork reviewed with patient at bedside. Copy given. Questions encouraged and answered. IV removed.  Patient escorted to ED entrance. Patient discharged to home.

## 2021-12-09 NOTE — PROGRESS NOTES
Received report from night shift RN. Pt is awake and alert. No signs of distress. Pt denies any nausea. Pt denies any numbness or tingling at this time. Pt denies any pain at this time. Dressing to l hand is cdi.  fall precautions in place. Bed in lowest and locked position. Call light within reach.

## 2021-12-09 NOTE — PROGRESS NOTES
Telemetry Shift Summary     Rhythm SB, SR  HR Range 52-71  Ectopy  rare PVCs, rare PACs, rare triplets  Measurements .20/.10/.42              Normal Values  Rhythm SR  HR Range    Measurements 0.12-0.20 / 0.06-0.10  / 0.30-0.52

## 2021-12-09 NOTE — PROGRESS NOTES
Pt refused to allow for his tele leads to be reattached. Pt educated on the importance of tele monitoring.  Pt refused

## 2021-12-09 NOTE — DISCHARGE SUMMARY
"Discharge Summary    CHIEF COMPLAINT ON ADMISSION  Chief Complaint   Patient presents with   • Hand Pain     Left, states had a splinter in hand \"couple of weeks ago,\" had been taking antibx, is concerned \"now I have all these lumps and things\"       Reason for Admission  Hand Pain      Admission Date  12/7/2021    CODE STATUS  Full code    HPI & HOSPITAL COURSE  This is a 71 y.o. male here with left hand abscess and foreign body of the left volar.  For details of admission see H&P note  Patient was consulted by orthopedic surgery/Dr. Mcfadden and undergone left volar thumb abscess incision and drainage with excisional debridement, left volar thumb foreign body removal (large sliver 1.5 cm x 3 mm removed).  Patient received antibiotics cefazolin.  While culture is in progress, patient requested to be discharged home next day and did not want to wait for culture and sensitivity.  There was no suggestion for risk of hospital-acquired MRSA infection as per history.  Patient was prescribed Bactrim and recommended to follow-up with Dr. Mcfadden in 1 to 2 weeks.      Therefore, he is discharged in good and stable condition to home with close outpatient follow-up.        Discharge Date  12/9/2021    FOLLOW UP ITEMS POST DISCHARGE  PCP  Orthopedic surgery/Dr. Mcfadden    DISCHARGE DIAGNOSES  Principal Problem:    Hand abscess POA: Yes  Active Problems:    Coronary artery disease involving native coronary artery of native heart without angina pectoris POA: Yes    Essential hypertension, benign POA: Yes    Bradycardia POA: Unknown  Resolved Problems:    * No resolved hospital problems. *      FOLLOW UP  No future appointments.  Guillermo Villalobos M.D.  [address]  [address]  [facility] [id number] [phone]            MEDICATIONS ON DISCHARGE     Medication List      START taking these medications      Instructions   sulfamethoxazole-trimethoprim 800-160 MG tablet  Commonly known as: BACTRIM DS   Take 1 " Tablet by mouth 2 times a day for 10 days.  Dose: 1 Tablet        CHANGE how you take these medications      Instructions   aspirin 81 MG EC tablet  What changed: when to take this   Take 1 tablet by mouth every day.  Dose: 81 mg     prasugrel 10 MG Tabs  What changed: when to take this  Commonly known as: EFFIENT   Take 1 tablet by mouth every day.  Dose: 10 mg        CONTINUE taking these medications      Instructions   atorvastatin 80 MG tablet  Commonly known as: LIPITOR   Take 1 tablet by mouth every evening.  Dose: 80 mg     B-12 PO   Take 1 tablet by mouth every day.  Dose: 1 Tablet     D3 PO   Take 1 capsule by mouth every day.  Dose: 1 Capsule     VITAMIN C PO   Take 1 tablet by mouth every day.  Dose: 1 Tablet        STOP taking these medications    cephALEXin 500 MG Caps  Commonly known as: KEFLEX            Allergies  No Known Allergies    DIET  No orders of the defined types were placed in this encounter.      ACTIVITY  As tolerated.  Weight bearing as tolerated    CONSULTATIONS  Orthopedic/Dr. Mcfadden    PROCEDURES  PROCEDURES PERFORMED:  1.  Left volar thumb abscess incision and drainage with excisional   debridement.  2.  Left volar thumb foreign body removal.    LABORATORY  Lab Results   Component Value Date    SODIUM 139 12/09/2021    POTASSIUM 4.2 12/09/2021    CHLORIDE 106 12/09/2021    CO2 24 12/09/2021    GLUCOSE 117 (H) 12/09/2021    BUN 20 12/09/2021    CREATININE 1.03 12/09/2021        Lab Results   Component Value Date    WBC 8.7 12/09/2021    HEMOGLOBIN 13.9 (L) 12/09/2021    HEMATOCRIT 40.9 (L) 12/09/2021    PLATELETCT 156 (L) 12/09/2021      CT-EXTREMITY, UPPER WITH LEFT   Final Result      1.  No evidence of fracture or bony destructive change      2.  Soft tissue abscess located within the web between the first and second digits and extending into the thenar area. This is multiloculated in nature with the largest component located volarly within the thenar area measuring 1 cm in  size. The volar    area does contain a linear slightly dense focus which may represent a retained splinter.      3.   Cellulitis.      4.  Multifocal osteoarthritis.            Total time of the discharge process exceeds 37 minutes.

## 2021-12-09 NOTE — DISCHARGE PLANNING
Anticipated discharge disposition: home    Action: discussed pt in morning rounds. Per MD pt anticipated to DC home today. 6 clicks 24 no anticipated DC needs.     Barriers to discharge: medical clearance.     Plan: care coordination will continue to follow for DC needs.

## 2021-12-09 NOTE — PROGRESS NOTES
Pt wanting to be discharged. Paged Dr. Abbott and spoke with MD regarding pt's request. MD Indicated if pt wants to leave he can leave ama but it is not advised. This RN also Informed Dr. Yoo of pt's request MD indicated he was not comfortable discharging pt today because MD is waiting for cultures to come back. Pt updated and agreeable to staying at this time.

## 2021-12-09 NOTE — OP REPORT
DATE OF SERVICE:  12/08/2021     PREOPERATIVE DIAGNOSES:  1.  Left volar thumb abscess.  2.  Left volar thumb foreign body.     POSTOPERATIVE DIAGNOSIS:  1.  Left volar thumb abscess.  2.  Left volar thumb foreign body.     PROCEDURES PERFORMED:  1.  Left volar thumb abscess incision and drainage with excisional   debridement.  2.  Left volar thumb foreign body removal.     SURGEON:  Guillermo Villalobos MD     ANESTHESIA:  General.     COMPLICATIONS:  None.     SPECIMENS:  Aerobic and anaerobic cultures.     FINDINGS:  Large sliver measuring 1.5 cm x 3 mm, removed from the wound.     COMPLICATIONS:  None.     ESTIMATED BLOOD LOSS:  5 mL.     INDICATIONS FOR PROCEDURE:  The patient presented to the emergency room the   day prior to surgery secondary to an increasing abscess over the volar aspect   of the left thumb in the area of the metacarpophalangeal joint.  He received a   sliver in this area roughly 3 weeks prior.  He had a CT scan, which showed an   abscess in the area and for this reason, the decision was made to take him to   the operating room today for the above-mentioned procedure.     DESCRIPTION OF PROCEDURE:  On the date of surgery, the patient was seen in the   preoperative area where informed consent was obtained with all risks and   benefits of the procedure explained and all questions answered.  He wished to   proceed with the surgery.  The proper site was marked.  He was subsequently   taken to the operating room and placed in the supine position with all bony   prominences well padded.  General endotracheal anesthesia was induced.  A   tourniquet was placed on the left upper extremity, was then prepped and draped   in the usual sterile fashion.     A timeout was performed with all persons in attendance agreeing on the proper   patient, proper surgical site, and proper surgery to be performed.     An Esmarch was used to exsanguinate the left upper extremity and tourniquet   was insufflated to 250  mmHg.  A longitudinal incision was made directly over   the area of the abscess.  Once I pierced through the skin and the dermal   layer, there was a sudden egress of purulent material.  Wound cultures were   obtained and passed off to the back table.  The wound was continuously   irrigated with copious amounts of normal saline.  I then used blunt dissection   to continue to dissect down towards the flexor tendon sheath to ensure there   was no flexor tenosynovitis.  In doing so, I was able to localize the large   sliver that was embedded in the wound.  This was measuring 1.5 cm x 3 mm.    This was and retrieved from the wound and passed off to the back table.  I   then visualized the flexor tendon sheath and it did not appear to be violated   and there was no purulent material within the flexor tendon sheath.  I then   excised nonviable tissue using tenotomy scissors.  The wound was then   thoroughly irrigated again with copious amounts of normal saline.  The wound   was then closed using 4-0 chromic gut suture in a horizontal mattress fashion.    Tourniquet was deflated at 11 minutes.  The wound was then dressed with   Xeroform, 4 x 4's, and a Tegaderm dressing.     DISPOSITION:  He will continue under the care of the hospitalist team.  He   will receive at least 2 doses of postoperative IV antibiotics.  Once wound   cultures have returned and we have a better idea of which oral antibiotics are   appropriate, he may be discharged to home.  He will have a 10-pound lifting   restriction of the left upper extremity.  He will return to clinic in 10-14   days for repeat evaluation.  He may remove the bandage after 2 days and begin   showering and washing his hands as normal, but should not soak the wound.        ______________________________  MD MAGGY Morgan/RYAN    DD:  12/09/2021 12:20  DT:  12/09/2021 12:39    Job#:  [id number]

## 2021-12-09 NOTE — DISCHARGE INSTRUCTIONS
Incision and Drainage, Care After  This sheet gives you information about how to care for yourself after your procedure. Your health care provider may also give you more specific instructions. If you have problems or questions, contact your health care provider.  What can I expect after the procedure?  After the procedure, it is common to have:  · Pain or discomfort around the incision site.  · Blood, fluid, or pus (drainage) from the incision.  · Redness and firm skin around the incision site.  Follow these instructions at home:  Medicines  · Take over-the-counter and prescription medicines only as told by your health care provider.  · If you were prescribed an antibiotic medicine, use or take it as told by your health care provider. Do not stop using the antibiotic even if you start to feel better.  Wound care  Follow instructions from your health care provider about how to take care of your wound. Make sure you:  · Wash your hands with soap and water before and after you change your bandage (dressing). If soap and water are not available, use hand .  · Change your dressing and packing as told by your health care provider.  ? If your dressing is dry or stuck when you try to remove it, moisten or wet the dressing with saline or water so that it can be removed without harming your skin or tissues.  ? If your wound is packed, leave it in place until your health care provider tells you to remove it. To remove the packing, moisten or wet the packing with saline or water so that it can be removed without harming your skin or tissues.  · Leave stitches (sutures), skin glue, or adhesive strips in place. These skin closures may need to stay in place for 2 weeks or longer. If adhesive strip edges start to loosen and curl up, you may trim the loose edges. Do not remove adhesive strips completely unless your health care provider tells you to do that.  Check your wound every day for signs of infection. Check  for:  · More redness, swelling, or pain.  · More fluid or blood.  · Warmth.  · Pus or a bad smell.  If you were sent home with a drain tube in place, follow instructions from your health care provider about:  · How to empty it.  · How to care for it at home.    General instructions  · Rest the affected area.  · Do not take baths, swim, or use a hot tub until your health care provider approves. Ask your health care provider if you may take showers. You may only be allowed to take sponge baths.  · Return to your normal activities as told by your health care provider. Ask your health care provider what activities are safe for you. Your health care provider may put you on activity or lifting restrictions.  · The incision will continue to drain. It is normal to have some clear or slightly bloody drainage. The amount of drainage should lessen each day.  · Do not apply any creams, ointments, or liquids unless you have been told to by your health care provider.  · Keep all follow-up visits as told by your health care provider. This is important.  Contact a health care provider if:  · Your cyst or abscess returns.  · You have a fever or chills.  · You have more redness, swelling, or pain around your incision.  · You have more fluid or blood coming from your incision.  · Your incision feels warm to the touch.  · You have pus or a bad smell coming from your incision.  · You have red streaks above or below the incision site.  Get help right away if:  · You have severe pain or bleeding.  · You cannot eat or drink without vomiting.  · You have decreased urine output.  · You become short of breath.  · You have chest pain.  · You cough up blood.  · The affected area becomes numb or starts to tingle.  These symptoms may represent a serious problem that is an emergency. Do not wait to see if the symptoms will go away. Get medical help right away. Call your local emergency services (911 in the [place] Do not drive yourself to the  hospital.  Summary  · After this procedure, it is common to have fluid, blood, or pus coming from the surgery site.  · Follow all home care instructions. You will be told how to take care of your incision, how to check for infection, and how to take medicines.  · If you were prescribed an antibiotic medicine, take it as told by your health care provider. Do not stop taking the antibiotic even if you start to feel better.  · Contact a health care provider if you have increased redness, swelling, or pain around your incision. Get help right away if you have chest pain, you vomit, you cough up blood, or you have shortness of breath.  · Keep all follow-up visits as told by your health care provider. This is important.  This information is not intended to replace advice given to you by your health care provider. Make sure you discuss any questions you have with your health care provider.  Document Released: 03/11/2013 Document Revised: 11/18/2019 Document Reviewed: 11/18/2019  [employer] Patient Education © 2020 [employer]    Discharge Instructions    Discharged to home by car with relative. Discharged via wheelchair, hospital escort: Yes.  Special equipment needed: Not Applicable    Be sure to schedule a follow-up appointment with your primary care doctor or any specialists as instructed.     Discharge Plan:   Diet Plan: Discussed  Activity Level: Discussed  Confirmed Follow up Appointment: Patient to Call and Schedule Appointment  Confirmed Symptoms Management: Discussed  Influenza Vaccine Indication: Patient Refuses    I understand that a diet low in cholesterol, fat, and sodium is recommended for good health. Unless I have been given specific instructions below for another diet, I accept this instruction as my diet prescription.   Other diet: resume home diet     Special Instructions: None    · Is patient discharged on Warfarin / Coumadin?   No     Depression / Suicide Risk    As you are discharged from this [facility] facility, it is important to learn how to keep safe from harming yourself.    Recognize the warning signs:  · Abrupt changes in personality, positive or negative- including increase in energy   · Giving away possessions  · Change in eating patterns- significant weight changes-  positive or negative  · Change in sleeping patterns- unable to sleep or sleeping all the time   · Unwillingness or inability to communicate  · Depression  · Unusual sadness, discouragement and loneliness  · Talk of wanting to die  · Neglect of personal appearance   · Rebelliousness- reckless behavior  · Withdrawal from people/activities they love  · Confusion- inability to concentrate     If you or a loved one observes any of these behaviors or has concerns about self-harm, here's what you can do:  · Talk about it- your feelings and reasons for harming yourself  · Remove any means that you might use to hurt yourself (examples: pills, rope, extension cords, firearm)  · Get professional help from the community (Mental Health, Substance Abuse, psychological counseling)  · Do not be alone:Call your Safe Contact- someone whom you trust who will be there for you.  · Call your local CRISIS HOTLINE 407-7176 or 156-521-9810  · Call your local Children's Mobile Crisis Response Team [place] (823) 649-7372 or www.[employer]  · Call the toll free National Suicide Prevention Hotlines   · National Suicide Prevention Lifeline 996-242-QGMZ (2570)  · National Hope Line Network 800-SUICIDE (955-2364)

## 2021-12-09 NOTE — CARE PLAN
The patient is Stable - Low risk of patient condition declining or worsening    Shift Goals  Clinical Goals: safety  Patient Goals: D/C    Progress made toward(s) clinical / shift goals:  fall precautions in place during shift.    Patient is not progressing towards the following goals:

## 2021-12-10 ENCOUNTER — PATIENT OUTREACH (OUTPATIENT)
Dept: HEALTH INFORMATION MANAGEMENT | Facility: OTHER | Age: 71
End: 2021-12-10

## 2021-12-10 NOTE — PROGRESS NOTES
Telemetry Shift Summary    Rhythm: sr/sb w/ bbb  HR: 56-60  Ectopy: n/a    Measurements for strip printed 12/9/2021 at 0915  HR 54  0.16 / 0.12 / 0.42    Normal Values  Rhythm: SR  HR:   Measurements: 0.12-0.20 / 0.06-0.10 / 0.30-0.52

## 2021-12-10 NOTE — PROGRESS NOTES
12/10/21-[facility] contacted pt post d/c via follow up call. Pt declined all CCM services at this time and would like to manage his care independently. Will d/c from caseload.

## 2021-12-11 LAB
BACTERIA WND AEROBE CULT: NORMAL
GRAM STN SPEC: NORMAL
SIGNIFICANT IND 70042: NORMAL
SITE SITE: NORMAL
SOURCE SOURCE: NORMAL

## 2021-12-13 LAB
BACTERIA SPEC ANAEROBE CULT: NORMAL
SIGNIFICANT IND 70042: NORMAL
SITE SITE: NORMAL
SOURCE SOURCE: NORMAL

## 2022-01-27 ENCOUNTER — OFFICE VISIT (OUTPATIENT)
Dept: CARDIOLOGY | Facility: MEDICAL CENTER | Age: 72
End: 2022-01-27
Payer: MEDICARE

## 2022-01-27 VITALS
SYSTOLIC BLOOD PRESSURE: 118 MMHG | HEIGHT: 69 IN | HEART RATE: 50 BPM | WEIGHT: 167.6 LBS | BODY MASS INDEX: 24.82 KG/M2 | DIASTOLIC BLOOD PRESSURE: 70 MMHG | OXYGEN SATURATION: 98 % | RESPIRATION RATE: 14 BRPM

## 2022-01-27 DIAGNOSIS — Z95.5 STENTED CORONARY ARTERY: ICD-10-CM

## 2022-01-27 DIAGNOSIS — I25.10 CORONARY ARTERY DISEASE INVOLVING NATIVE CORONARY ARTERY OF NATIVE HEART WITHOUT ANGINA PECTORIS: ICD-10-CM

## 2022-01-27 DIAGNOSIS — I77.810 ASCENDING AORTA DILATION (HCC): ICD-10-CM

## 2022-01-27 DIAGNOSIS — I10 HTN (HYPERTENSION), MALIGNANT: ICD-10-CM

## 2022-01-27 PROCEDURE — 99214 OFFICE O/P EST MOD 30 MIN: CPT | Performed by: INTERNAL MEDICINE

## 2022-01-27 RX ORDER — CYANOCOBALAMIN (VITAMIN B-12)
1 POWDER (GRAM) MISCELLANEOUS DAILY
Status: ON HOLD | COMMUNITY
End: 2023-04-11

## 2022-01-27 ASSESSMENT — ENCOUNTER SYMPTOMS
BLOOD IN STOOL: 0
FEVER: 0
CHILLS: 0
VOMITING: 0
LOSS OF CONSCIOUSNESS: 0
SHORTNESS OF BREATH: 0
PALPITATIONS: 0
DEPRESSION: 0
ABDOMINAL PAIN: 0
SENSORY CHANGE: 0
ORTHOPNEA: 0
NAUSEA: 0
COUGH: 0
FALLS: 0
BRUISES/BLEEDS EASILY: 0
HEADACHES: 0
DOUBLE VISION: 0
EYE PAIN: 0
HALLUCINATIONS: 0
SPEECH CHANGE: 0
PND: 0
MYALGIAS: 0
CLAUDICATION: 0
DIZZINESS: 0
WEIGHT LOSS: 0
EYE DISCHARGE: 0
BLURRED VISION: 0

## 2022-01-27 ASSESSMENT — FIBROSIS 4 INDEX: FIB4 SCORE: 1.76

## 2022-01-27 NOTE — PROGRESS NOTES
Chief Complaint   Patient presents with   • Coronary Artery Disease     F/V DX: Coronary artery disease involving native coronary artery of native heart without angina pectoris        Subjective     Vasile Alpesh Bowers is a 71 y.o. male who presents today for cardiac care and management due to established coronary arterial disease status post OM1 PCI in July 2021, hypertension, hyperlipidemia.    I have independently interpreted and reviewed echocardiogram's actual images with patient which showed normal left ventricular systolic function. No wall motion abnormality. No evidence of pulmonary hypertension. No significant valvular disease.    I have independently interpreted and reviewed blood tests results with patient in clinic which shows normal LDL level 45, triglycerides 27, renal and liver function.    In the interim, patient has been doing well without having any symptoms. Patient denies having chest pain, dyspnea, palpitation, presyncope, syncope episodes. Able to climb up at least 2 flights of stairs.      Past Medical History:   Diagnosis Date   • Ascending aorta dilation (HCC)    • CAD (coronary artery disease)      Past Surgical History:   Procedure Laterality Date   • IRRIGATION & DEBRIDEMENT ORTHO Left 12/8/2021    Procedure: IRRIGATION AND DEBRIDEMENT, WOUND - HAND;  Surgeon: Guillermo Villalobos M.D.;  Location: SURGERY [facility];  Service: Orthopedics   • ZZZ CARDIAC CATH  07/2021    PCI to OM1    • KNEE ARTHROSCOPY     • SHOULDER ARTHROSCOPY       Family History   Problem Relation Age of Onset   • Heart Disease Mother    • Heart Failure Mother      Social History     Socioeconomic History   • Marital status:      Spouse name: Not on file   • Number of children: Not on file   • Years of education: Not on file   • Highest education level: Not on file   Occupational History   • Not on file   Tobacco Use   • Smoking status: Former Smoker   • Smokeless tobacco: Never Used   Vaping Use   •  Vaping Use: Never used   Substance and Sexual Activity   • Alcohol use: Not Currently   • Drug use: No   • Sexual activity: Not on file   Other Topics Concern   • Not on file   Social History Narrative   • Not on file     Social Determinants of Health     Financial Resource Strain:    • Difficulty of Paying Living Expenses: Not on file   Food Insecurity:    • Worried About Running Out of Food in the Last Year: Not on file   • Ran Out of Food in the Last Year: Not on file   Transportation Needs:    • Lack of Transportation (Medical): Not on file   • Lack of Transportation (Non-Medical): Not on file   Physical Activity:    • Days of Exercise per Week: Not on file   • Minutes of Exercise per Session: Not on file   Stress:    • Feeling of Stress : Not on file   Social Connections:    • Frequency of Communication with Friends and Family: Not on file   • Frequency of Social Gatherings with Friends and Family: Not on file   • Attends Sikh Services: Not on file   • Active Member of Clubs or Organizations: Not on file   • Attends Club or Organization Meetings: Not on file   • Marital Status: Not on file   Intimate Partner Violence:    • Fear of Current or Ex-Partner: Not on file   • Emotionally Abused: Not on file   • Physically Abused: Not on file   • Sexually Abused: Not on file   Housing Stability:    • Unable to Pay for Housing in the Last Year: Not on file   • Number of Places Lived in the Last Year: Not on file   • Unstable Housing in the Last Year: Not on file     No Known Allergies  Outpatient Encounter Medications as of 1/27/2022   Medication Sig Dispense Refill   • Cholecalciferol (VITAMIN D3) 511149 UNIT/GM Powder Take 1 Capsule by mouth every day.     • metoprolol tartrate (LOPRESSOR) 25 MG Tab Take 1 Tablet by mouth 2 times a day. 60 Tablet 11   • metoprolol tartrate (LOPRESSOR) 25 MG Tab Take 1 Tablet by mouth 2 times a day. 60 Tablet 11   • atorvastatin (LIPITOR) 80 MG tablet Take 1 tablet by mouth every  "evening. 90 tablet 3   • prasugrel (EFFIENT) 10 MG Tab Take 1 tablet by mouth every day. (Patient taking differently: Take 10 mg by mouth every evening.) 90 tablet 3   • aspirin EC 81 MG EC tablet Take 1 tablet by mouth every day. (Patient taking differently: Take 81 mg by mouth every evening.) 30 tablet 2   • Ascorbic Acid (VITAMIN C PO) Take 1 tablet by mouth every day.     • Cholecalciferol (D3 PO) Take 1 capsule by mouth every day.     • Cyanocobalamin (B-12 PO) Take 1 tablet by mouth every day.     • Cyanocobalamin Powder Take 1 Tablet by mouth every day.       No facility-administered encounter medications on file as of 1/27/2022.     Review of Systems   Constitutional: Negative for chills, fever, malaise/fatigue and weight loss.   HENT: Negative for ear discharge, ear pain, hearing loss and nosebleeds.    Eyes: Negative for blurred vision, double vision, pain and discharge.   Respiratory: Negative for cough and shortness of breath.    Cardiovascular: Negative for chest pain, palpitations, orthopnea, claudication, leg swelling and PND.   Gastrointestinal: Negative for abdominal pain, blood in stool, melena, nausea and vomiting.   Genitourinary: Negative for dysuria and hematuria.   Musculoskeletal: Negative for falls, joint pain and myalgias.   Skin: Negative for itching and rash.   Neurological: Negative for dizziness, sensory change, speech change, loss of consciousness and headaches.   Endo/Heme/Allergies: Negative for environmental allergies. Does not bruise/bleed easily.   Psychiatric/Behavioral: Negative for depression, hallucinations and suicidal ideas.              Objective     /70 (BP Location: Left arm, Patient Position: Sitting, BP Cuff Size: Adult)   Pulse (!) 50   Resp 14   Ht 1.753 m (5' 9\")   Wt 76 kg (167 lb 9.6 oz)   SpO2 98%   BMI 24.75 kg/m²     Physical Exam  Vitals and nursing note reviewed.   Constitutional:       General: He is not in acute distress.     Appearance: He is " not diaphoretic.   HENT:      Head: Normocephalic and atraumatic.      Right Ear: External ear normal.      Left Ear: External ear normal.      Nose: No congestion or rhinorrhea.   Eyes:      General:         Right eye: No discharge.         Left eye: No discharge.   Neck:      Thyroid: No thyromegaly.      Vascular: No JVD.   Cardiovascular:      Rate and Rhythm: Normal rate and regular rhythm.      Pulses: Normal pulses.   Pulmonary:      Effort: No respiratory distress.   Abdominal:      General: There is no distension.      Tenderness: There is no abdominal tenderness.   Musculoskeletal:         General: No swelling or tenderness.      Right lower leg: No edema.      Left lower leg: No edema.   Skin:     General: Skin is warm and dry.   Neurological:      Mental Status: He is alert and oriented to person, place, and time.      Cranial Nerves: No cranial nerve deficit.   Psychiatric:         Behavior: Behavior normal.         Assessment & Plan     1. Coronary artery disease involving native coronary artery of native heart without angina pectoris  metoprolol tartrate (LOPRESSOR) 25 MG Tab    metoprolol tartrate (LOPRESSOR) 25 MG Tab   2. Stented coronary artery  metoprolol tartrate (LOPRESSOR) 25 MG Tab    metoprolol tartrate (LOPRESSOR) 25 MG Tab   3. Ascending aorta dilation (HCC)     4. HTN (hypertension), malignant         Medical Decision Making: Today's Assessment/Status/Plan:   Coronary arterial disease s/p PCI to OM1 in 07/2021:  Betablocker as Metoprolol 25 mg po 2x daily.  ASA 81 mg po daily and Prasugrel 10mg 1x daily  Statin as Atorvastatin 80 mg po daily  Will add ACE-I or ARB in future.     Hypertension:  Optimize control with BB, ACE-I or ARB as permitted above in conjunction with CAD treatment.     Hyperlipidemia:  Optimize statin as within guidelines of CAD treatment as above.    Overall, based on prior history of obstructive coronary arterial disease, patient is at at high risk for recurrent  events and will require consistent ongoing guidelines directed medical therapy to reduce his cardiovascular mortality and associated complications.    I will see patient back in clinic with lab tests and studies results in 3 months.    I thank you for referring patient to our Cardiology Clinic today.      Avril Benson MD.   [facility].  [phone]  [place], [place].

## 2022-03-29 PROBLEM — M47.816 LUMBAR SPONDYLOSIS: Status: ACTIVE | Noted: 2022-03-29

## 2022-03-29 PROBLEM — M19.019: Status: ACTIVE | Noted: 2022-03-29

## 2022-04-26 PROBLEM — M46.1 SACROILIITIS (HCC): Status: ACTIVE | Noted: 2022-04-26

## 2022-04-29 ENCOUNTER — OFFICE VISIT (OUTPATIENT)
Dept: CARDIOLOGY | Facility: MEDICAL CENTER | Age: 72
End: 2022-04-29
Payer: MEDICARE

## 2022-04-29 VITALS
BODY MASS INDEX: 24.88 KG/M2 | HEIGHT: 69 IN | HEART RATE: 54 BPM | DIASTOLIC BLOOD PRESSURE: 70 MMHG | RESPIRATION RATE: 16 BRPM | WEIGHT: 168 LBS | SYSTOLIC BLOOD PRESSURE: 108 MMHG | OXYGEN SATURATION: 97 %

## 2022-04-29 DIAGNOSIS — I77.810 ASCENDING AORTA DILATION (HCC): ICD-10-CM

## 2022-04-29 DIAGNOSIS — Z95.5 STENTED CORONARY ARTERY: ICD-10-CM

## 2022-04-29 DIAGNOSIS — I25.10 CORONARY ARTERY DISEASE INVOLVING NATIVE CORONARY ARTERY OF NATIVE HEART WITHOUT ANGINA PECTORIS: ICD-10-CM

## 2022-04-29 DIAGNOSIS — I10 HTN (HYPERTENSION), MALIGNANT: ICD-10-CM

## 2022-04-29 PROCEDURE — 99214 OFFICE O/P EST MOD 30 MIN: CPT | Performed by: INTERNAL MEDICINE

## 2022-04-29 ASSESSMENT — ENCOUNTER SYMPTOMS
BLURRED VISION: 0
DIZZINESS: 0
ABDOMINAL PAIN: 0
CLAUDICATION: 0
NAUSEA: 0
DOUBLE VISION: 0
PND: 0
HEADACHES: 0
PALPITATIONS: 0
FALLS: 0
ORTHOPNEA: 0
WEIGHT LOSS: 0
VOMITING: 0
CHILLS: 0
DEPRESSION: 0
EYE DISCHARGE: 0
SHORTNESS OF BREATH: 0
SPEECH CHANGE: 0
BLOOD IN STOOL: 0
LOSS OF CONSCIOUSNESS: 0
HALLUCINATIONS: 0
EYE PAIN: 0
SENSORY CHANGE: 0
FEVER: 0
COUGH: 0
MYALGIAS: 0
BRUISES/BLEEDS EASILY: 0

## 2022-04-29 ASSESSMENT — FIBROSIS 4 INDEX: FIB4 SCORE: 1.76

## 2022-04-29 NOTE — PROGRESS NOTES
Chief Complaint   Patient presents with   • Coronary Artery Disease     F/V DX: Coronary artery disease involving native coronary artery of native heart without angina pectoris   • MI (Non ST Segment Elevation MI)   • Hypertension       Subjective     Vasile Bowers is a 71 y.o. male who presents today for cardiac care and management due to established coronary arterial disease status post OM1 PCI in July 2021, hypertension, hyperlipidemia.    I have independently interpreted and reviewed echocardiogram's actual images with patient which showed normal left ventricular systolic function. No wall motion abnormality. No evidence of pulmonary hypertension. No significant valvular disease.    I have independently interpreted and reviewed blood tests results with patient in clinic which shows normal LDL level 45, triglycerides 27, renal and liver function.    In the interim, patient has been doing well without having any symptoms. Patient denies having chest pain, dyspnea, palpitation, presyncope, syncope episodes. Able to climb up at least 2 flights of stairs.      Past Medical History:   Diagnosis Date   • Ascending aorta dilation (HCC)    • CAD (coronary artery disease)      Past Surgical History:   Procedure Laterality Date   • IRRIGATION & DEBRIDEMENT ORTHO Left 12/8/2021    Procedure: IRRIGATION AND DEBRIDEMENT, WOUND - HAND;  Surgeon: Guillermo Villalobos M.D.;  Location: SURGERY [facility];  Service: Orthopedics   • ZZZ CARDIAC CATH  07/2021    PCI to OM1    • KNEE ARTHROSCOPY     • SHOULDER ARTHROSCOPY       Family History   Problem Relation Age of Onset   • Heart Disease Mother    • Heart Failure Mother      Social History     Socioeconomic History   • Marital status:      Spouse name: Not on file   • Number of children: Not on file   • Years of education: Not on file   • Highest education level: Not on file   Occupational History   • Not on file   Tobacco Use   • Smoking status: Former Smoker    • Smokeless tobacco: Never Used   Vaping Use   • Vaping Use: Never used   Substance and Sexual Activity   • Alcohol use: Not Currently   • Drug use: No   • Sexual activity: Not on file   Other Topics Concern   • Not on file   Social History Narrative   • Not on file     Social Determinants of Health     Financial Resource Strain: Not on file   Food Insecurity: Not on file   Transportation Needs: Not on file   Physical Activity: Not on file   Stress: Not on file   Social Connections: Not on file   Intimate Partner Violence: Not on file   Housing Stability: Not on file     Allergies   Allergen Reactions   • Metoprolol      Outpatient Encounter Medications as of 4/29/2022   Medication Sig Dispense Refill   • Cholecalciferol (VITAMIN D3) 450534 UNIT/GM Powder Take 1 Capsule by mouth every day.     • Cyanocobalamin Powder Take 1 Tablet by mouth every day.     • atorvastatin (LIPITOR) 80 MG tablet Take 1 tablet by mouth every evening. 90 tablet 3   • prasugrel (EFFIENT) 10 MG Tab Take 1 tablet by mouth every day. (Patient taking differently: Take 10 mg by mouth every evening.) 90 tablet 3   • aspirin EC 81 MG EC tablet Take 1 tablet by mouth every day. (Patient taking differently: Take 81 mg by mouth every evening.) 30 tablet 2   • Ascorbic Acid (VITAMIN C PO) Take 1 tablet by mouth every day.     • Cholecalciferol (D3 PO) Take 1 capsule by mouth every day.     • Cyanocobalamin (B-12 PO) Take 1 tablet by mouth every day.     • [DISCONTINUED] metoprolol tartrate (LOPRESSOR) 25 MG Tab Take 1 Tablet by mouth 2 times a day. (Patient not taking: Reported on 4/29/2022) 60 Tablet 11   • [DISCONTINUED] metoprolol tartrate (LOPRESSOR) 25 MG Tab Take 1 Tablet by mouth 2 times a day. (Patient not taking: Reported on 4/29/2022) 60 Tablet 11     No facility-administered encounter medications on file as of 4/29/2022.     Review of Systems   Constitutional: Negative for chills, fever, malaise/fatigue and weight loss.   HENT: Negative  "for ear discharge, ear pain, hearing loss and nosebleeds.    Eyes: Negative for blurred vision, double vision, pain and discharge.   Respiratory: Negative for cough and shortness of breath.    Cardiovascular: Negative for chest pain, palpitations, orthopnea, claudication, leg swelling and PND.   Gastrointestinal: Negative for abdominal pain, blood in stool, melena, nausea and vomiting.   Genitourinary: Negative for dysuria and hematuria.   Musculoskeletal: Negative for falls, joint pain and myalgias.   Skin: Negative for itching and rash.   Neurological: Negative for dizziness, sensory change, speech change, loss of consciousness and headaches.   Endo/Heme/Allergies: Negative for environmental allergies. Does not bruise/bleed easily.   Psychiatric/Behavioral: Negative for depression, hallucinations and suicidal ideas.              Objective     /70 (BP Location: Left arm, Patient Position: Sitting, BP Cuff Size: Adult)   Pulse (!) 54   Resp 16   Ht 1.753 m (5' 9\")   Wt 76.2 kg (168 lb)   SpO2 97%   BMI 24.81 kg/m²     Physical Exam  Vitals and nursing note reviewed.   Constitutional:       General: He is not in acute distress.     Appearance: He is not diaphoretic.   HENT:      Head: Normocephalic and atraumatic.      Right Ear: External ear normal.      Left Ear: External ear normal.      Nose: No congestion or rhinorrhea.   Eyes:      General:         Right eye: No discharge.         Left eye: No discharge.   Neck:      Thyroid: No thyromegaly.      Vascular: No JVD.   Cardiovascular:      Rate and Rhythm: Normal rate and regular rhythm.      Pulses: Normal pulses.   Pulmonary:      Effort: No respiratory distress.   Abdominal:      General: There is no distension.      Tenderness: There is no abdominal tenderness.   Musculoskeletal:         General: No swelling or tenderness.      Right lower leg: No edema.      Left lower leg: No edema.   Skin:     General: Skin is warm and dry.   Neurological:      " Mental Status: He is alert and oriented to person, place, and time.      Cranial Nerves: No cranial nerve deficit.   Psychiatric:         Behavior: Behavior normal.         Assessment & Plan     1. Coronary artery disease involving native coronary artery of native heart without angina pectoris     2. Stented coronary artery     3. Ascending aorta dilation (HCC)     4. HTN (hypertension), malignant         Medical Decision Making: Today's Assessment/Status/Plan:   Coronary arterial disease s/p PCI to OM1 in 07/2021:  Betablocker will not be started at patient's request.  ASA 81 mg po daily and Prasugrel 10mg 1x daily  Statin as Atorvastatin 80 mg po daily  Based on his prior history of PCI, patient does require more medication based on guideline directed medical therapy to optimize.  However, patient does prefer not to be on so many medications.  Therefore, we will not add any more medications today.  Patient understand the risksd and the benefits of such decision.     Hypertension:  Blood pressure is well controlled.     Hyperlipidemia:  Optimize statin as within guidelines of CAD treatment as above.    Overall, based on prior history of obstructive coronary arterial disease, patient is at at high risk for recurrent events and will require consistent ongoing guidelines directed medical therapy to reduce his cardiovascular mortality and associated complications.    I will see patient back in clinic with lab tests and studies results in 12 months.    I thank you for referring patient to our Cardiology Clinic today.      Avril Benson MD.   [facility].  [phone]  [place], [place].

## 2022-05-02 NOTE — ED TRIAGE NOTES
"Chief Complaint   Patient presents with   • Chest Pain     \"dull\", intermittent since yesterday, awoke him from sleep this morning.     ED Triage Vitals [07/25/21 0556]   Enc Vitals Group      Blood Pressure  145/76      Pulse (!) 47      Respiration 16      Temperature 36 °C (96.8 °F)      Temp src Temporal      Pulse Oximetry 98 %      Weight 74.3 kg (163 lb 12.8 oz)      Height 1.753 m (5' 9\")     " Restorative Technician Note      Patient Name: Johnny Butt     Restorative Tech Visit Date: 05/02/22  Note Type: Mobility  Patient Position Upon Consult: Bedside chair  Activity Performed: Ambulated  Assistive Device: Standard walker; Other (Comment) (chair follow)  Patient Position at End of Consult: Bedside chair;  All needs within reach    Luis SHARPT, Restorative Technician

## 2022-05-07 ENCOUNTER — HOSPITAL ENCOUNTER (OUTPATIENT)
Dept: LAB | Facility: MEDICAL CENTER | Age: 72
End: 2022-05-07
Attending: INTERNAL MEDICINE
Payer: MEDICARE

## 2022-05-07 DIAGNOSIS — I25.10 CORONARY ARTERY DISEASE INVOLVING NATIVE CORONARY ARTERY OF NATIVE HEART WITHOUT ANGINA PECTORIS: ICD-10-CM

## 2022-05-07 DIAGNOSIS — Z95.5 STENTED CORONARY ARTERY: ICD-10-CM

## 2022-05-07 LAB
ANION GAP SERPL CALC-SCNC: 10 MMOL/L (ref 7–16)
BUN SERPL-MCNC: 18 MG/DL (ref 8–22)
CALCIUM SERPL-MCNC: 9 MG/DL (ref 8.5–10.5)
CHLORIDE SERPL-SCNC: 107 MMOL/L (ref 96–112)
CHOLEST SERPL-MCNC: 115 MG/DL (ref 100–199)
CO2 SERPL-SCNC: 25 MMOL/L (ref 20–33)
CREAT SERPL-MCNC: 1.06 MG/DL (ref 0.5–1.4)
FASTING STATUS PATIENT QL REPORTED: NORMAL
GFR SERPLBLD CREATININE-BSD FMLA CKD-EPI: 75 ML/MIN/1.73 M 2
GLUCOSE SERPL-MCNC: 89 MG/DL (ref 65–99)
HDLC SERPL-MCNC: 68 MG/DL
LDLC SERPL CALC-MCNC: 42 MG/DL
POTASSIUM SERPL-SCNC: 4.4 MMOL/L (ref 3.6–5.5)
SODIUM SERPL-SCNC: 142 MMOL/L (ref 135–145)
TRIGL SERPL-MCNC: 27 MG/DL (ref 0–149)

## 2022-05-07 PROCEDURE — 80048 BASIC METABOLIC PNL TOTAL CA: CPT

## 2022-05-07 PROCEDURE — 36415 COLL VENOUS BLD VENIPUNCTURE: CPT

## 2022-05-07 PROCEDURE — 80061 LIPID PANEL: CPT

## 2022-05-09 ENCOUNTER — TELEPHONE (OUTPATIENT)
Dept: CARDIOLOGY | Facility: MEDICAL CENTER | Age: 72
End: 2022-05-09
Payer: MEDICARE

## 2022-05-09 NOTE — LETTER
May 9, 2022        Vasile Bowers  [address] [place] [address]          Dear Vasile,    We have received the results of your recent:    Lab Work.    Your test came back within normal limits.  Please follow up as previously discussed with your physician.      Feel free to call us with any questions.        Sincerely,          Harsha Wilkerson Ass't of  TO  Electronically Signed

## 2022-07-27 PROCEDURE — 99281 EMR DPT VST MAYX REQ PHY/QHP: CPT

## 2022-07-28 ENCOUNTER — HOSPITAL ENCOUNTER (EMERGENCY)
Facility: MEDICAL CENTER | Age: 72
End: 2022-07-28
Attending: EMERGENCY MEDICINE
Payer: MEDICARE

## 2022-07-28 VITALS
DIASTOLIC BLOOD PRESSURE: 86 MMHG | TEMPERATURE: 98.8 F | WEIGHT: 162.4 LBS | BODY MASS INDEX: 24.05 KG/M2 | HEART RATE: 59 BPM | SYSTOLIC BLOOD PRESSURE: 139 MMHG | OXYGEN SATURATION: 98 % | HEIGHT: 69 IN | RESPIRATION RATE: 16 BRPM

## 2022-07-28 DIAGNOSIS — H92.01 RIGHT EAR PAIN: ICD-10-CM

## 2022-07-28 DIAGNOSIS — H60.331 ACUTE SWIMMER'S EAR OF RIGHT SIDE: ICD-10-CM

## 2022-07-28 RX ORDER — CIPROFLOXACIN/HYDROCORTISONE 0.2 %-1 %
3 SUSPENSION, DROPS(FINAL DOSAGE FORM)(ML) OTIC (EAR) 2 TIMES DAILY
Qty: 3 ML | Refills: 0 | Status: SHIPPED | OUTPATIENT
Start: 2022-07-28 | End: 2022-08-02

## 2022-07-28 ASSESSMENT — FIBROSIS 4 INDEX: FIB4 SCORE: 1.76

## 2022-07-28 NOTE — ED PROVIDER NOTES
ED Provider Note    CHIEF COMPLAINT  Chief Complaint   Patient presents with   • Ear Pain     Right ear pain began 7/26, believes its from swimming.  Denies any fever, hearing loss, ringing of ears, or any drainage.  No cough/sore throat/congestion/HA.        HPI  Patient is a 71-year-old male with a past medical history of prior MI 1 year ago who presents emergency room for right-sided ear pain and discomfort that began 2 days ago.  The patient is a regular swimmer, was noticing some pain and discomfort with a small amount of hearing changes on the right side.  He has not had any headaches, no neck pain, no fevers, chills, facial pain, eye discharge or voice changes.    PPE Note: I personally donned full PPE for all patient encounters during this visit, including being clean-shaven with an N95 respirator mask, gloves, and goggles.     REVIEW OF SYSTEMS  See HPI, all other review of systems are negative    PAST MEDICAL HISTORY   has a past medical history of Ascending aorta dilation (HCC) and CAD (coronary artery disease).    SOCIAL HISTORY  Social History     Tobacco Use   • Smoking status: Former Smoker   • Smokeless tobacco: Never Used   Vaping Use   • Vaping Use: Never used   Substance and Sexual Activity   • Alcohol use: Not Currently   • Drug use: No   • Sexual activity: Not on file       SURGICAL HISTORY   has a past surgical history that includes knee arthroscopy; shoulder arthroscopy; zzz cardiac cath (07/2021); and irrigation & debridement ortho (Left, 12/8/2021).    CURRENT MEDICATIONS  Home Medications     Reviewed by Marissa Stallworth R.N. (Registered Nurse) on 07/28/22 at 0005  Med List Status: Partial   Medication Last Dose Status   Ascorbic Acid (VITAMIN C PO)  Active   aspirin EC 81 MG EC tablet  Active   atorvastatin (LIPITOR) 80 MG tablet  Active   Cholecalciferol (D3 PO)  Active   Cholecalciferol (VITAMIN D3) 710988 UNIT/GM Powder  Active   Cyanocobalamin (B-12 PO)  Active   Cyanocobalamin Powder   "Active   prasugrel (EFFIENT) 10 MG Tab  Active                ALLERGIES  Allergies   Allergen Reactions   • Metoprolol        PHYSICAL EXAM  VITAL SIGNS: /86   Pulse (!) 59   Temp 37.1 °C (98.8 °F) (Temporal)   Resp 16   Ht 1.753 m (5' 9\")   Wt 73.7 kg (162 lb 6.4 oz)   SpO2 98%   BMI 23.98 kg/m²    Pulse ox interpretation: I interpret this pulse ox as normal.  Genl: M sitting in chair comfortably, speaking clearly, appears in no acute distress   ENT: Left TM is normal, normal external canal, right TM is obstructed by erythematous and exudative changes in the external canal.  No pain with palpation of the mastoid process.  Mucous membranes moist, posterior pharynx clear, uvula midline, nares patent bilaterally   Eyes: Normal sclera, pupils equal round reactive to light  Neck: Supple, FROM, no LAD appreciated  Pulmonary: Lungs are clear to auscultation bilaterally  CV:  RRR, no murmur appreciated  Abdomen: soft, NT/ND    COURSE & MEDICAL DECISION MAKING  No orders to display     Labs Reviewed - No data to display    Pertinent Labs & Imaging studies reviewed. (See chart for details)    DDX: Otitis externa, otitis media, malignant otitis unlikely, ear foreign body    MDM  Number of Diagnoses or Management Options      Initial evaluation at 1210:  Patient presents emergency room for symptoms as described above.  The patient has clinical exam findings suggestive of right-sided otitis externa without evidence of acute tympanic membrane rupture.  There is no surrounding external ear changes, no tenderness with the anatomical structures of the skull nor is there facial pain or other acute constitutional complaints.  Vital signs are reassuring, at this time he is counseled regarding the likelihood of a swimmer's ear and external ear infection.  Small amounts of pain relief drops are administered into the ear canal, prescription for administration of fluoroquinolone eardrops is also provided and the patient is " discharged home with strict return precautions and follow-up in the outpatient clinic.    FINAL IMPRESSION  Visit Diagnoses     ICD-10-CM   1. Acute swimmer's ear of right side  H60.331   2. Right ear pain  H92.01     Electronically signed by: Talha Wen M.D., 7/28/2022 12:10 AM

## 2022-07-28 NOTE — ED TRIAGE NOTES
"Chief Complaint   Patient presents with   • Ear Pain     Right ear pain began 7/26, believes its from swimming.  Denies any fever, hearing loss, ringing of ears, or any drainage.  No cough/sore throat/congestion/HA.      /86   Pulse (!) 59   Temp 37.1 °C (98.8 °F) (Temporal)   Resp 16   Ht 1.753 m (5' 9\")   Wt 73.7 kg (162 lb 6.4 oz)   SpO2 98%   BMI 23.98 kg/m²     Covid vaccinated x4.    "

## 2022-08-04 ENCOUNTER — APPOINTMENT (RX ONLY)
Dept: URBAN - METROPOLITAN AREA CLINIC 6 | Facility: CLINIC | Age: 72
Setting detail: DERMATOLOGY
End: 2022-08-04

## 2022-08-04 DIAGNOSIS — L57.0 ACTINIC KERATOSIS: ICD-10-CM

## 2022-08-04 DIAGNOSIS — Z85.828 PERSONAL HISTORY OF OTHER MALIGNANT NEOPLASM OF SKIN: ICD-10-CM

## 2022-08-04 DIAGNOSIS — L82.1 OTHER SEBORRHEIC KERATOSIS: ICD-10-CM

## 2022-08-04 DIAGNOSIS — L81.4 OTHER MELANIN HYPERPIGMENTATION: ICD-10-CM

## 2022-08-04 DIAGNOSIS — Z71.89 OTHER SPECIFIED COUNSELING: ICD-10-CM

## 2022-08-04 DIAGNOSIS — D22 MELANOCYTIC NEVI: ICD-10-CM

## 2022-08-04 DIAGNOSIS — D18.0 HEMANGIOMA: ICD-10-CM

## 2022-08-04 PROBLEM — D22.61 MELANOCYTIC NEVI OF RIGHT UPPER LIMB, INCLUDING SHOULDER: Status: ACTIVE | Noted: 2022-08-04

## 2022-08-04 PROBLEM — D18.01 HEMANGIOMA OF SKIN AND SUBCUTANEOUS TISSUE: Status: ACTIVE | Noted: 2022-08-04

## 2022-08-04 PROBLEM — D22.71 MELANOCYTIC NEVI OF RIGHT LOWER LIMB, INCLUDING HIP: Status: ACTIVE | Noted: 2022-08-04

## 2022-08-04 PROBLEM — D22.5 MELANOCYTIC NEVI OF TRUNK: Status: ACTIVE | Noted: 2022-08-04

## 2022-08-04 PROBLEM — D22.62 MELANOCYTIC NEVI OF LEFT UPPER LIMB, INCLUDING SHOULDER: Status: ACTIVE | Noted: 2022-08-04

## 2022-08-04 PROBLEM — D22.72 MELANOCYTIC NEVI OF LEFT LOWER LIMB, INCLUDING HIP: Status: ACTIVE | Noted: 2022-08-04

## 2022-08-04 PROCEDURE — 17000 DESTRUCT PREMALG LESION: CPT

## 2022-08-04 PROCEDURE — ? LIQUID NITROGEN

## 2022-08-04 PROCEDURE — 99203 OFFICE O/P NEW LOW 30 MIN: CPT | Mod: 25

## 2022-08-04 PROCEDURE — ? COUNSELING

## 2022-08-04 PROCEDURE — 17003 DESTRUCT PREMALG LES 2-14: CPT

## 2022-08-04 ASSESSMENT — LOCATION DETAILED DESCRIPTION DERM
LOCATION DETAILED: RIGHT MEDIAL ZYGOMA
LOCATION DETAILED: LEFT ANTERIOR DISTAL UPPER ARM
LOCATION DETAILED: RIGHT PROXIMAL CALF
LOCATION DETAILED: LEFT PROXIMAL DORSAL FOREARM
LOCATION DETAILED: LEFT PROXIMAL CALF
LOCATION DETAILED: LEFT INFERIOR UPPER BACK
LOCATION DETAILED: LEFT SUPERIOR LATERAL FOREHEAD
LOCATION DETAILED: RIGHT SUPERIOR CRUS OF ANTIHELIX
LOCATION DETAILED: LEFT SUPERIOR FOREHEAD
LOCATION DETAILED: RIGHT VENTRAL PROXIMAL FOREARM
LOCATION DETAILED: LEFT DISTAL DORSAL FOREARM
LOCATION DETAILED: RIGHT DISTAL POSTERIOR UPPER ARM
LOCATION DETAILED: LEFT INFERIOR LATERAL FOREHEAD
LOCATION DETAILED: RIGHT ANTERIOR PROXIMAL THIGH
LOCATION DETAILED: RIGHT MEDIAL INFERIOR CHEST
LOCATION DETAILED: RIGHT SUPERIOR PREAURICULAR CHEEK
LOCATION DETAILED: LEFT CENTRAL MALAR CHEEK
LOCATION DETAILED: RIGHT ANTERIOR DISTAL UPPER ARM
LOCATION DETAILED: LEFT DISTAL POSTERIOR THIGH
LOCATION DETAILED: RIGHT MID-UPPER BACK
LOCATION DETAILED: RIGHT VENTRAL DISTAL FOREARM
LOCATION DETAILED: RIGHT PROXIMAL POSTERIOR UPPER ARM
LOCATION DETAILED: LEFT VENTRAL DISTAL FOREARM
LOCATION DETAILED: RIGHT DISTAL LATERAL POSTERIOR UPPER ARM
LOCATION DETAILED: LEFT ANTERIOR PROXIMAL THIGH
LOCATION DETAILED: RIGHT PROXIMAL DORSAL FOREARM
LOCATION DETAILED: RIGHT SUPERIOR LATERAL MIDBACK
LOCATION DETAILED: RIGHT DISTAL POSTERIOR THIGH

## 2022-08-04 ASSESSMENT — LOCATION SIMPLE DESCRIPTION DERM
LOCATION SIMPLE: LEFT FOREARM
LOCATION SIMPLE: LEFT POSTERIOR THIGH
LOCATION SIMPLE: RIGHT CALF
LOCATION SIMPLE: RIGHT THIGH
LOCATION SIMPLE: CHEST
LOCATION SIMPLE: RIGHT LOWER BACK
LOCATION SIMPLE: RIGHT POSTERIOR THIGH
LOCATION SIMPLE: RIGHT UPPER BACK
LOCATION SIMPLE: LEFT THIGH
LOCATION SIMPLE: RIGHT ZYGOMA
LOCATION SIMPLE: LEFT UPPER BACK
LOCATION SIMPLE: RIGHT UPPER ARM
LOCATION SIMPLE: RIGHT CHEEK
LOCATION SIMPLE: LEFT CALF
LOCATION SIMPLE: RIGHT EAR
LOCATION SIMPLE: RIGHT FOREARM
LOCATION SIMPLE: LEFT CHEEK
LOCATION SIMPLE: LEFT UPPER ARM
LOCATION SIMPLE: LEFT FOREHEAD

## 2022-08-04 ASSESSMENT — LOCATION ZONE DERM
LOCATION ZONE: ARM
LOCATION ZONE: EAR
LOCATION ZONE: FACE
LOCATION ZONE: TRUNK
LOCATION ZONE: LEG

## 2022-08-04 NOTE — PROCEDURE: LIQUID NITROGEN
Show Applicator Variable?: Yes
Render Note In Bullet Format When Appropriate: No
Application Tool (Optional): Liquid Nitrogen Sprayer
Consent: The patient's consent was obtained including but not limited to risks of crusting, scabbing, blistering, scarring, darker or lighter pigmentary change, recurrence, incomplete removal and infection.
Duration Of Freeze Thaw-Cycle (Seconds): 5
Post-Care Instructions: I reviewed with the patient in detail post-care instructions. Patient is to wear sunprotection, and avoid picking at any of the treated lesions. Pt may apply Vaseline to crusted or scabbing areas.
Number Of Freeze-Thaw Cycles: 3 freeze-thaw cycles
Detail Level: Detailed

## 2022-08-22 DIAGNOSIS — I25.10 CORONARY ARTERY DISEASE INVOLVING NATIVE CORONARY ARTERY OF NATIVE HEART WITHOUT ANGINA PECTORIS: ICD-10-CM

## 2022-08-22 NOTE — TELEPHONE ENCOUNTER
Is the patient due for a refill? Yes    Was the patient seen the past year? Yes    Date of last office visit: 4/29/22    Does the patient have an upcoming appointment?  No    Provider to refill:TT    Does the patients insurance require a 100 day supply?  No

## 2022-08-23 RX ORDER — ATORVASTATIN CALCIUM 80 MG/1
TABLET, FILM COATED ORAL
Qty: 90 TABLET | Refills: 2 | Status: SHIPPED | OUTPATIENT
Start: 2022-08-23 | End: 2023-06-05 | Stop reason: SDUPTHER

## 2022-08-23 RX ORDER — PRASUGREL 10 MG/1
TABLET, FILM COATED ORAL
Qty: 90 TABLET | Refills: 2 | Status: SHIPPED
Start: 2022-08-23 | End: 2023-05-13

## 2022-11-08 ENCOUNTER — PATIENT MESSAGE (OUTPATIENT)
Dept: HEALTH INFORMATION MANAGEMENT | Facility: OTHER | Age: 72
End: 2022-11-08

## 2022-11-26 ENCOUNTER — HOSPITAL ENCOUNTER (OUTPATIENT)
Dept: LAB | Facility: MEDICAL CENTER | Age: 72
End: 2022-11-26
Attending: FAMILY MEDICINE
Payer: MEDICARE

## 2022-11-26 LAB — PSA SERPL-MCNC: 0.42 NG/ML (ref 0–4)

## 2022-11-26 PROCEDURE — 84153 ASSAY OF PSA TOTAL: CPT | Mod: GA

## 2022-11-26 PROCEDURE — 36415 COLL VENOUS BLD VENIPUNCTURE: CPT | Mod: GA

## 2023-01-24 PROBLEM — S76.209A: Status: ACTIVE | Noted: 2023-01-24

## 2023-02-21 ENCOUNTER — APPOINTMENT (RX ONLY)
Dept: URBAN - METROPOLITAN AREA CLINIC 6 | Facility: CLINIC | Age: 73
Setting detail: DERMATOLOGY
End: 2023-02-21

## 2023-02-21 DIAGNOSIS — D69.2 OTHER NONTHROMBOCYTOPENIC PURPURA: ICD-10-CM

## 2023-02-21 DIAGNOSIS — D22 MELANOCYTIC NEVI: ICD-10-CM

## 2023-02-21 DIAGNOSIS — Z85.828 PERSONAL HISTORY OF OTHER MALIGNANT NEOPLASM OF SKIN: ICD-10-CM

## 2023-02-21 DIAGNOSIS — L57.0 ACTINIC KERATOSIS: ICD-10-CM

## 2023-02-21 DIAGNOSIS — L82.1 OTHER SEBORRHEIC KERATOSIS: ICD-10-CM

## 2023-02-21 DIAGNOSIS — Z71.89 OTHER SPECIFIED COUNSELING: ICD-10-CM

## 2023-02-21 DIAGNOSIS — L81.4 OTHER MELANIN HYPERPIGMENTATION: ICD-10-CM

## 2023-02-21 DIAGNOSIS — D18.0 HEMANGIOMA: ICD-10-CM

## 2023-02-21 PROBLEM — D22.71 MELANOCYTIC NEVI OF RIGHT LOWER LIMB, INCLUDING HIP: Status: ACTIVE | Noted: 2023-02-21

## 2023-02-21 PROBLEM — D22.5 MELANOCYTIC NEVI OF TRUNK: Status: ACTIVE | Noted: 2023-02-21

## 2023-02-21 PROBLEM — D22.61 MELANOCYTIC NEVI OF RIGHT UPPER LIMB, INCLUDING SHOULDER: Status: ACTIVE | Noted: 2023-02-21

## 2023-02-21 PROBLEM — D22.72 MELANOCYTIC NEVI OF LEFT LOWER LIMB, INCLUDING HIP: Status: ACTIVE | Noted: 2023-02-21

## 2023-02-21 PROBLEM — D22.62 MELANOCYTIC NEVI OF LEFT UPPER LIMB, INCLUDING SHOULDER: Status: ACTIVE | Noted: 2023-02-21

## 2023-02-21 PROBLEM — D18.01 HEMANGIOMA OF SKIN AND SUBCUTANEOUS TISSUE: Status: ACTIVE | Noted: 2023-02-21

## 2023-02-21 PROCEDURE — ? DIAGNOSIS COMMENT

## 2023-02-21 PROCEDURE — 17003 DESTRUCT PREMALG LES 2-14: CPT

## 2023-02-21 PROCEDURE — 17000 DESTRUCT PREMALG LESION: CPT

## 2023-02-21 PROCEDURE — 99213 OFFICE O/P EST LOW 20 MIN: CPT | Mod: 25

## 2023-02-21 PROCEDURE — ? LIQUID NITROGEN

## 2023-02-21 PROCEDURE — ? COUNSELING

## 2023-02-21 ASSESSMENT — LOCATION DETAILED DESCRIPTION DERM
LOCATION DETAILED: LEFT VENTRAL DISTAL FOREARM
LOCATION DETAILED: RIGHT MEDIAL INFERIOR CHEST
LOCATION DETAILED: RIGHT PROXIMAL DORSAL FOREARM
LOCATION DETAILED: RIGHT VENTRAL PROXIMAL FOREARM
LOCATION DETAILED: RIGHT SUPERIOR FOREHEAD
LOCATION DETAILED: LEFT DISTAL POSTERIOR THIGH
LOCATION DETAILED: LEFT ANTERIOR DISTAL UPPER ARM
LOCATION DETAILED: LEFT PROXIMAL CALF
LOCATION DETAILED: RIGHT DISTAL POSTERIOR THIGH
LOCATION DETAILED: RIGHT ANTERIOR PROXIMAL THIGH
LOCATION DETAILED: RIGHT PROXIMAL CALF
LOCATION DETAILED: RIGHT SUPERIOR LATERAL MIDBACK
LOCATION DETAILED: RIGHT ANTERIOR DISTAL UPPER ARM
LOCATION DETAILED: LEFT CENTRAL MALAR CHEEK
LOCATION DETAILED: LEFT DISTAL DORSAL FOREARM
LOCATION DETAILED: LEFT INFERIOR UPPER BACK
LOCATION DETAILED: RIGHT VENTRAL DISTAL FOREARM
LOCATION DETAILED: LEFT SUPERIOR LATERAL MALAR CHEEK
LOCATION DETAILED: LEFT ANTERIOR PROXIMAL THIGH
LOCATION DETAILED: LEFT MEDIAL FRONTAL SCALP
LOCATION DETAILED: RIGHT SUPERIOR CRUS OF ANTIHELIX
LOCATION DETAILED: RIGHT SUPERIOR PREAURICULAR CHEEK
LOCATION DETAILED: LEFT PROXIMAL DORSAL FOREARM
LOCATION DETAILED: LEFT CENTRAL PARIETAL SCALP
LOCATION DETAILED: RIGHT MID-UPPER BACK

## 2023-02-21 ASSESSMENT — LOCATION SIMPLE DESCRIPTION DERM
LOCATION SIMPLE: RIGHT LOWER BACK
LOCATION SIMPLE: LEFT SCALP
LOCATION SIMPLE: CHEST
LOCATION SIMPLE: LEFT CALF
LOCATION SIMPLE: RIGHT CHEEK
LOCATION SIMPLE: LEFT THIGH
LOCATION SIMPLE: RIGHT FOREARM
LOCATION SIMPLE: RIGHT UPPER BACK
LOCATION SIMPLE: SCALP
LOCATION SIMPLE: RIGHT THIGH
LOCATION SIMPLE: RIGHT POSTERIOR THIGH
LOCATION SIMPLE: LEFT UPPER BACK
LOCATION SIMPLE: RIGHT UPPER ARM
LOCATION SIMPLE: RIGHT FOREHEAD
LOCATION SIMPLE: LEFT FOREARM
LOCATION SIMPLE: RIGHT CALF
LOCATION SIMPLE: LEFT POSTERIOR THIGH
LOCATION SIMPLE: RIGHT EAR
LOCATION SIMPLE: LEFT UPPER ARM
LOCATION SIMPLE: LEFT CHEEK

## 2023-02-21 ASSESSMENT — LOCATION ZONE DERM
LOCATION ZONE: SCALP
LOCATION ZONE: ARM
LOCATION ZONE: TRUNK
LOCATION ZONE: FACE
LOCATION ZONE: EAR
LOCATION ZONE: LEG

## 2023-02-21 ASSESSMENT — SEVERITY ASSESSMENT: SEVERITY: MILD

## 2023-02-21 ASSESSMENT — BSA RASH: BSA RASH: 1

## 2023-02-21 NOTE — PROCEDURE: LIQUID NITROGEN
Number Of Freeze-Thaw Cycles: 3 freeze-thaw cycles
Render Post-Care Instructions In Note?: no
Duration Of Freeze Thaw-Cycle (Seconds): 10
Post-Care Instructions: I reviewed with the patient in detail post-care instructions. Patient is to wear sunprotection, and avoid picking at any of the treated lesions. Pt may apply Vaseline to crusted or scabbing areas.
Application Tool (Optional): Liquid Nitrogen Sprayer
Show Applicator Variable?: Yes
Consent: The patient's consent was obtained including but not limited to risks of crusting, scabbing, blistering, scarring, darker or lighter pigmentary change, recurrence, incomplete removal and infection.
Detail Level: Detailed

## 2023-02-21 NOTE — PROCEDURE: DIAGNOSIS COMMENT
Render Risk Assessment In Note?: no
Comment: Patient unable to relay history of trauma. Follow up in 1 month if not resolved.
Detail Level: Simple

## 2023-03-14 ENCOUNTER — OFFICE VISIT (OUTPATIENT)
Dept: PHYSICAL MEDICINE AND REHAB | Facility: MEDICAL CENTER | Age: 73
End: 2023-03-14
Payer: MEDICARE

## 2023-03-14 VITALS
WEIGHT: 160.5 LBS | SYSTOLIC BLOOD PRESSURE: 126 MMHG | OXYGEN SATURATION: 96 % | TEMPERATURE: 97.6 F | HEART RATE: 43 BPM | BODY MASS INDEX: 24.32 KG/M2 | DIASTOLIC BLOOD PRESSURE: 88 MMHG | HEIGHT: 68 IN

## 2023-03-14 DIAGNOSIS — G89.29 CHRONIC RIGHT-SIDED LOW BACK PAIN WITHOUT SCIATICA: ICD-10-CM

## 2023-03-14 DIAGNOSIS — M47.816 LUMBAR SPONDYLOSIS: Primary | ICD-10-CM

## 2023-03-14 DIAGNOSIS — G89.29 OTHER CHRONIC PAIN: ICD-10-CM

## 2023-03-14 DIAGNOSIS — M54.50 CHRONIC RIGHT-SIDED LOW BACK PAIN WITHOUT SCIATICA: ICD-10-CM

## 2023-03-14 DIAGNOSIS — Z91.81 RISK FOR FALLS: ICD-10-CM

## 2023-03-14 DIAGNOSIS — M48.061 NEUROFORAMINAL STENOSIS OF LUMBAR SPINE: ICD-10-CM

## 2023-03-14 PROCEDURE — 99204 OFFICE O/P NEW MOD 45 MIN: CPT | Performed by: STUDENT IN AN ORGANIZED HEALTH CARE EDUCATION/TRAINING PROGRAM

## 2023-03-14 ASSESSMENT — PAIN SCALES - GENERAL: PAINLEVEL: 5=MODERATE PAIN

## 2023-03-14 ASSESSMENT — PATIENT HEALTH QUESTIONNAIRE - PHQ9: CLINICAL INTERPRETATION OF PHQ2 SCORE: 0

## 2023-03-14 ASSESSMENT — FIBROSIS 4 INDEX: FIB4 SCORE: 1.79

## 2023-03-14 NOTE — H&P (VIEW-ONLY)
New Patient Note    Interventional Pain and Spine  Physiatry (Physical Medicine and Rehabilitation)     Patient Name: Vasile Bowers  : 1950  Date of Service: 3/14/2023  PCP: Diamond Clayton M.D.  Referring Provider: Clarisse Heath M.D.    Chief Complaint:   Chief Complaint   Patient presents with    New Patient     Right hip pain       HPI  HISTORY (3/14/2023):  Vasile Bowers is a 72 y.o. male who presents today with chronic right-sided low back pain that does not radiate down the legs.  He denies numbness, tingling, or weakness in the legs.  He attributes the pain to years of skiing, snowboarding, and surfing.  He continues to remain physically active as much as he can.  He notes that nothing has helped with his pain including epidural steroid injections or Celebrex or meloxicam or physical therapy. He denies left sided low back pain.    Pain right now is 5/10 on the numeric pain scale. His pain at its best-worse level during the course of the day is typically 2-5/10, respectively.  Pain worsens with standing and walking and improves with sitting and bending forward.  He saw Dr. Heath who felt that surgical intervention was not warranted at this time with the absence of radiating pain and instead recommended right-sided L4-5 and L5-S1 facet joint ablations.  Of note he is also seeing [facility] for his right hip and has been offered a right hip replacement.  He notes hip injections have not helped in the past.      The patient has done physical therapy for this problem, most recently about 1 year ago with no relief. Consistently does stretching component of home exercise program     Patient has tried the following medications with varied success (current meds in bold):   Celebrex -no longer taking due to no relief  Meloxicam-no longer taking due to no relief  Nerve block (unknown type) -no longer taking due to no relief  Tylenol -no relief  Ibuprofen -no relief    Therapeutic modalities  and interventional therapies to date include:  - epidural steroid injection - no relief  - hip joint injection - no relief  -Of note he reports that he is anticipating receiving shoulder injections with [facility] soon    Medical history includes NSTEMI, hypertension.    Psychological testing for pain as depression and pain commonly coexist and need to both be treated.     Opioid Risk Score: 4      Interpretation of Opioid Risk Score   Score 0-3 = Low risk of abuse. Do UDS at least once per year.  Score 4-7 = Moderate risk of abuse. Do UDS 1-4 times per year.  Score 8+ = High risk of abuse. Refer to specialist.    PHQ      7/25/2021    10:00 PM 12/7/2021     4:00 PM 3/14/2023     8:00 AM   Depression Screen (PHQ-2/PHQ-9)   PHQ-2 Total Score 0 0    PHQ-2 Total Score   0       Interpretation of PHQ-9 Total Score   Score Severity   1-4 No Depression   5-9 Mild Depression   10-14 Moderate Depression   15-19 Moderately Severe Depression   20-27 Severe Depression      Medical records review:  I reviewed the note from the referring provider Clarisse Heath M.D. including the note dated 2/9/23.    ROS:   Red Flags ROS:   Fever, Chills, Sweats: Denies  Involuntary Weight Loss: Denies  Bladder Incontinence: Denies  Bowel Incontinence: denies  Saddle Anesthesia: Denies    All other systems reviewed and negative.     PMHx:   Past Medical History:   Diagnosis Date    Ascending aorta dilation (HCC)     CAD (coronary artery disease)     Chronic pain     High cholesterol     Myocardial infarct (HCC)        PSHx:   Past Surgical History:   Procedure Laterality Date    IRRIGATION & DEBRIDEMENT ORTHO Left 12/8/2021    Procedure: IRRIGATION AND DEBRIDEMENT, WOUND - HAND;  Surgeon: Guillermo Villalobos M.D.;  Location: SURGERY [facility];  Service: Orthopedics    ZZZ CARDIAC CATH  07/2021    PCI to OM1     KNEE ARTHROSCOPY      SHOULDER ARTHROSCOPY         Family Hx:   Family History   Problem Relation Age of Onset    Heart Disease  Mother     Heart Failure Mother        Social Hx:  Social History     Socioeconomic History    Marital status:      Spouse name: Not on file    Number of children: Not on file    Years of education: Not on file    Highest education level: Not on file   Occupational History    Not on file   Tobacco Use    Smoking status: Former     Types: Cigarettes     Quit date:      Years since quittin.2    Smokeless tobacco: Former     Types: Chew     Quit date:    Vaping Use    Vaping Use: Never used   Substance and Sexual Activity    Alcohol use: Not Currently    Drug use: No    Sexual activity: Not on file   Other Topics Concern    Not on file   Social History Narrative    Not on file     Social Determinants of Health     Financial Resource Strain: Not on file   Food Insecurity: Not on file   Transportation Needs: Not on file   Physical Activity: Not on file   Stress: Not on file   Social Connections: Not on file   Intimate Partner Violence: Not on file   Housing Stability: Not on file       Allergies:  No Known Allergies    Medications: reviewed on epic.   Outpatient Medications Marked as Taking for the 3/14/23 encounter (Office Visit) with Brooke Brown M.D.   Medication Sig Dispense Refill    celecoxib (CELEBREX) 200 MG Cap Take 1 Capsule by mouth 2 times daily with meals as needed for Moderate Pain or Inflammation. 90 Capsule 5    meloxicam (MOBIC) 15 MG tablet Take 1 Tablet by mouth every day. 30 Tablet 0    baclofen (LIORESAL) 5 MG Tab Take 1-2 Tablets by mouth 3 times a day. 60 Tablet 0    meloxicam (MOBIC) 15 MG tablet Take 1 Tablet by mouth every day. 30 Tablet 0    prasugrel (EFFIENT) 10 MG Tab TAKE ONE TABLET BY MOUTH EVERY DAY 90 Tablet 2    atorvastatin (LIPITOR) 80 MG tablet TAKE ONE TABLET BY MOUTH EVERY EVENING 90 Tablet 2    Cholecalciferol (VITAMIN D3) 937203 UNIT/GM Powder Take 1 Capsule by mouth every day.      Cyanocobalamin Powder Take 1 Tablet by mouth every day.      aspirin EC 81  "MG EC tablet Take 1 tablet by mouth every day. (Patient taking differently: Take 81 mg by mouth every evening.) 30 tablet 2    Ascorbic Acid (VITAMIN C PO) Take 1 tablet by mouth every day.      Cholecalciferol (D3 PO) Take 1 capsule by mouth every day.      Cyanocobalamin (B-12 PO) Take 1 tablet by mouth every day.          Current Outpatient Medications on File Prior to Visit   Medication Sig Dispense Refill    celecoxib (CELEBREX) 200 MG Cap Take 1 Capsule by mouth 2 times daily with meals as needed for Moderate Pain or Inflammation. 90 Capsule 5    meloxicam (MOBIC) 15 MG tablet Take 1 Tablet by mouth every day. 30 Tablet 0    baclofen (LIORESAL) 5 MG Tab Take 1-2 Tablets by mouth 3 times a day. 60 Tablet 0    meloxicam (MOBIC) 15 MG tablet Take 1 Tablet by mouth every day. 30 Tablet 0    prasugrel (EFFIENT) 10 MG Tab TAKE ONE TABLET BY MOUTH EVERY DAY 90 Tablet 2    atorvastatin (LIPITOR) 80 MG tablet TAKE ONE TABLET BY MOUTH EVERY EVENING 90 Tablet 2    Cholecalciferol (VITAMIN D3) 834515 UNIT/GM Powder Take 1 Capsule by mouth every day.      Cyanocobalamin Powder Take 1 Tablet by mouth every day.      aspirin EC 81 MG EC tablet Take 1 tablet by mouth every day. (Patient taking differently: Take 81 mg by mouth every evening.) 30 tablet 2    Ascorbic Acid (VITAMIN C PO) Take 1 tablet by mouth every day.      Cholecalciferol (D3 PO) Take 1 capsule by mouth every day.      Cyanocobalamin (B-12 PO) Take 1 tablet by mouth every day.      gabapentin (NEURONTIN) 100 MG Cap 1 capsule PO BID. May slowly increase to a max of 900mg PO BID (Patient not taking: Reported on 3/14/2023) 90 Capsule 2     No current facility-administered medications on file prior to visit.         EXAMINATION     Physical Exam:   /88 (BP Location: Left arm, Patient Position: Sitting, BP Cuff Size: Adult)   Pulse (!) 43   Temp 36.4 °C (97.6 °F) (Temporal)   Ht 1.727 m (5' 8\")   Wt 72.8 kg (160 lb 7.9 oz)   SpO2 96%     Constitutional: "   Body Habitus: Body mass index is 24.4 kg/m².  Cooperation: Fully cooperates with exam  Appearance: Well-groomed, well-nourished.    Eyes: No scleral icterus to suggest severe liver disease, no proptosis to suggest severe hyperthyroidism    ENT -no obvious auditory deficits, no noticeable facial droop     Skin -no rashes or lesions noted     Respiratory-  breathing comfortably on room air, no audible wheezing    Cardiovascular-distal extremities warm and well perfused.  No lower extremity edema is noted.     Gastrointestinal - no obvious abdominal masses, non-distended    Psychiatric- alert and oriented ×3. Normal affect.     Gait - normal gait, no use of ambulatory device, nonantalgic. Heel walking and toe walking intact.    Musculoskeletal and Neuro -     Thoracic/Lumbar Spine/Sacral Spine/Hips   Inspection: No evidence of atrophy in bilateral lower extremities throughout     There is full active range of motion with lumbar extension    Facet loading maneuver negative bilaterally    Palpation:   Tenderness to palpation over the lumbar facets on right spanning approximately L1-L5 levels. No tenderness to palpation elsewhere in the low back/hips including midline of lumbosacral spine, paraspinal muscles bilaterally, lumbar facets on left spanning approximately L1-L5 levels, sacroiliac joints bilaterally, PSIS bilaterally, and greater trochanters bilaterally.    Lumbar spine /hip provocative exam maneuvers  Straight leg raise negative bilaterally  FADIR test negative bilaterally    SI joint tests  EDWAR test negative bilaterally  Thigh thrust test negative bilaterally    Key points for the international standards for neurological classification of spinal cord injury (ISNCSCI) to light touch.   Dermatome R L   L2 2 2   L3 2 2   L4 2 2   L5 2 2   S1 2 2   S2 2 2       Motor Exam Lower Extremities  ? Myotome R L   Hip flexion L2 5 5   Knee extension L3 5 5   Ankle dorsiflexion L4 5 5   Toe extension L5 5 5   Ankle  plantarflexion S1 5 5       Reflexes  ?  R L   Patella  2+ 2+   Achilles   2+ 2+     Clonus of the ankle negative bilaterally       MEDICAL DECISION MAKING    Medical records review: see under HPI section.     DATA    Labs: Personally reviewed at today's visit:     Lab Results   Component Value Date/Time    SODIUM 142 05/07/2022 10:10 AM    POTASSIUM 4.4 05/07/2022 10:10 AM    CHLORIDE 107 05/07/2022 10:10 AM    CO2 25 05/07/2022 10:10 AM    ANION 10.0 05/07/2022 10:10 AM    GLUCOSE 89 05/07/2022 10:10 AM    BUN 18 05/07/2022 10:10 AM    CREATININE 1.06 05/07/2022 10:10 AM    CALCIUM 9.0 05/07/2022 10:10 AM    ASTSGOT 18 12/09/2021 04:56 AM    ALTSGPT 21 12/09/2021 04:56 AM    TBILIRUBIN 0.3 12/09/2021 04:56 AM    ALBUMIN 3.8 12/09/2021 04:56 AM    TOTPROTEIN 6.0 12/09/2021 04:56 AM    GLOBULIN 2.2 12/09/2021 04:56 AM    AGRATIO 1.7 12/09/2021 04:56 AM       Lab Results   Component Value Date/Time    PROTHROMBTM 13.2 07/26/2021 02:59 AM    INR 1.03 07/26/2021 02:59 AM        Lab Results   Component Value Date/Time    WBC 8.7 12/09/2021 04:56 AM    RBC 4.20 (L) 12/09/2021 04:56 AM    HEMOGLOBIN 13.9 (L) 12/09/2021 04:56 AM    HEMATOCRIT 40.9 (L) 12/09/2021 04:56 AM    MCV 97.4 12/09/2021 04:56 AM    MCH 33.1 (H) 12/09/2021 04:56 AM    MCHC 34.0 12/09/2021 04:56 AM    MPV 9.7 12/09/2021 04:56 AM    NEUTSPOLYS 84.90 (H) 12/09/2021 04:56 AM    LYMPHOCYTES 8.10 (L) 12/09/2021 04:56 AM    MONOCYTES 6.60 12/09/2021 04:56 AM    EOSINOPHILS 0.00 12/09/2021 04:56 AM    BASOPHILS 0.10 12/09/2021 04:56 AM        No results found for: HBA1C     Imaging:   I personally reviewed following images, these are my reads  MRI lumbar spine 12/20/2022  Spondylolisthesis at L5-S1.  Disc space narrowing at L4-5 and L5-S1.  Disc bulge most notable at L4-5 and L5-S1.  Subacute compression fracture at superior endplate of L1.  Severe left-sided neuroforaminal stenosis at L5-S1, moderate right-sided neuroforaminal stenosis at L5-S1. Severe  right-sided neuroforaminal stenosis at L4-5.  Facet arthropathy most notable at bilateral L4-5 and L5-S1. See formal radiology report for further details.    Unable to view images from XR lumbar spine 22 at the time of today's visit    IMAGING radiology reads. I reviewed the following radiology reads   MRI lumbar spine 2022        XR lumbar spine 22  AP lateral and flexion-extension views of the lumbar spine reveal severe   degenerative changes at the lumbar spine as well as what appears to be   chronic scoliosis/curvature.  These chronic findings make it difficult to   ascertain if there is an acute, nondisplaced fracture.      Diagnosis  Visit Diagnoses     ICD-10-CM   1. Lumbar spondylosis  M47.816   2. Chronic right-sided low back pain without sciatica  M54.50    G89.29   3. Other chronic pain  G89.29   4. Risk for falls  Z91.81   5. Neuroforaminal stenosis of lumbar spine  M48.061         ASSESSMENT AND PLAN:  Vasile Bowers ( 1950) is a male with history of chronic low back pain who presents with right-sided low back pain at his lumbar facets where lumbar spondylosis is seen on his MRI 2022.    Vasile was seen today for new patient.    Diagnoses and all orders for this visit:    Lumbar spondylosis  -     Referral to Pain Clinic    Chronic right-sided low back pain without sciatica    Other chronic pain    Risk for falls  -     Patient identified as fall risk.  Appropriate orders and counseling given.    Neuroforaminal stenosis of lumbar spine          PLAN  Physical Therapy: Patient has done extensive physical therapy for this issue in the past and has been diligent with his home exercise program since.  Discussed that he should continue his home exercise program as able    Home Exercise Program: I provided the patient with a home exercise program focusing on strengthening and stretching.     Diagnostic workup: Personally reviewed at today's visit:  MRI lumbar spine  "12/20/2022, X-ray lumbar spine 12/14/2022    Medications:   -The patient has tried several NSAIDs including Celebrex and Mobic, Tylenol, \"nerve blocker\" with no relief    Interventions:   -Discussed diagnostic lumbar medial branch blocks targeting right L4-5 and L5-S1 facet joints. The risks, benefits, and alternatives to this procedure were discussed and the patient wishes to proceed with the procedure. Risks include but are not limited to damage to surrounding structures, infection, bleeding, worsening of pain which can be permanent, and weakness which can be permanent. Benefits include pain relief and improved function. Alternatives include not doing the procedure.    -The patient reports he has received epidurals in the past with no relief at [facility] and with Dr. Live.  Request those records today    Outside records requested:  The patient signed outside records request form for his outside records including outside images. This includes the records from  [facility], Dr. Live    Follow-up: 1 week after lumbar medial branch blocks above    Orders Placed This Encounter    Referral to Pain Clinic    Patient identified as fall risk.  Appropriate orders and counseling given.       Brooke Brown MD  Interventional Pain and Spine  Physical Medicine and Rehabilitation  [facility]     Clarisse Heath M.D.     The above note documents my personal evaluation of this patient. In addition, I have reviewed and confirmed with the patient and MA the supportive information documented in today's Patient Health Questionnaire and Office Note.     Please note that this dictation was created using voice recognition software. I have made every reasonable attempt to correct obvious errors, but I expect that there are errors of grammar and possibly content that I did not discover before finalizing the note.  "

## 2023-03-14 NOTE — PATIENT INSTRUCTIONS
Your procedure will be at the [facility] special procedure suite.    [facility] [place] [address]       PRE-PROCEDURE INSTRUCTIONS  You may take your regular medications except:   No Anti-inflammatories 5 days prior to your procedure. Anti-inflammatories include medicines such as  ibuprofen (Motrin, Advil), Excedrin, Naproxen (Aleve, Anaprox, Naprelan, Naprosyn), Celecoxib (Celebrex), Diclofenac (Voltaren-XR tab), and Meloxicam (Mobic).   You can take the remainder of your pain medications as prescribed.   If you are having a diagnostic procedure such as a medial branch block, do not use her pain meds on the day of the procedure  No Glucophage or Metformin 24 hours before your procedure. You may resume next day after your procedure.  Call the physiatry office if you are taking or prescribed anti-biotics within five days of procedure.  Please ask provider if you are taking any new diabetes medication.  CONTINUE TAKING BLOOD PRESSURE MEDICATIONS AS PRESCRIBED.  Pain medications will not be prescribed on the procedure day. Procedural pain medication may be used by your provider   Call your doctor's office performing the procedure if you have a fever, chills, rash or new illness prior to your procedure    Anticoagulation/antiplatelet medications  No Blood thinning medications such as Coumadin, Xarelto, aspirin or Plavix 5 days prior to procedure unless your doctor said to continue these medications. Call your doctor if a new medication is prescribed in this class.     Restrictions for eating before procedure:   If you are getting procedural sedation, then do not eat to for 8 hours prior to procedure appointment time. Do not drink fluids for four hours prior to your procedure time.   If you are not having procedural sedation, then Skip the meal prior to your procedure. If you have a morning procedure then skip breakfast. If you have an afternoon procedure then skip lunch.   You may drink clear liquids  up to 2 hours prior to your procedure  You must have a  the day of procedure to accompany you home.      POST PROCEDURE INSTRUCTIONS   No heavy lifting, strenuous bending or strenuous exercise for 3 days after your procedure.  No hot tubs, baths, swimming for 3 days after your procedure  You can remove the bandage the day after the procedure.  IF YOU RECEIVED A DIAGNOSTIC PROCEDURE (SUCH AS A MEDIAL BRANCH BLOCK), PLEASE NOTE THAT WE DO EXPECT THIS INJECTION TO WEAR OFF.  IT IS IMPORTANT TO COMPLETE THE PAIN DIARY AND LIST THE PAIN SCORE ONLY FOR THE REGION WHERE THE PROCEDURE WAS AND BRING THIS TO YOUR FOLLOW UP VISIT.  IF YOU EXPERIENCE PROLONGED WEAKNESS LONGER THAN ONE DAY, BOWEL OR BLADDER INCONTINENCE THEN PLEASE CALL THE PHYSIATRY OFFICE.  Your leg may feel heavy, weak and numb for up to 1-2 days. Be very careful walking.    You may resume normal activities 3 days after procedure.

## 2023-03-14 NOTE — PROGRESS NOTES
New Patient Note    Interventional Pain and Spine  Physiatry (Physical Medicine and Rehabilitation)     Patient Name: Vasile Bowers  : 1950  Date of Service: 3/14/2023  PCP: Diamond Clayton M.D.  Referring Provider: Clarisse Heath M.D.    Chief Complaint:   Chief Complaint   Patient presents with    New Patient     Right hip pain       HPI  HISTORY (3/14/2023):  Vasile Bowers is a 72 y.o. male who presents today with chronic right-sided low back pain that does not radiate down the legs.  He denies numbness, tingling, or weakness in the legs.  He attributes the pain to years of skiing, snowboarding, and surfing.  He continues to remain physically active as much as he can.  He notes that nothing has helped with his pain including epidural steroid injections or Celebrex or meloxicam or physical therapy. He denies left sided low back pain.    Pain right now is 5/10 on the numeric pain scale. His pain at its best-worse level during the course of the day is typically 2-5/10, respectively.  Pain worsens with standing and walking and improves with sitting and bending forward.  He saw Dr. Heath who felt that surgical intervention was not warranted at this time with the absence of radiating pain and instead recommended right-sided L4-5 and L5-S1 facet joint ablations.  Of note he is also seeing [facility] for his right hip and has been offered a right hip replacement.  He notes hip injections have not helped in the past.      The patient has done physical therapy for this problem, most recently about 1 year ago with no relief. Consistently does stretching component of home exercise program     Patient has tried the following medications with varied success (current meds in bold):   Celebrex -no longer taking due to no relief  Meloxicam-no longer taking due to no relief  Nerve block (unknown type) -no longer taking due to no relief  Tylenol -no relief  Ibuprofen -no relief    Therapeutic modalities  and interventional therapies to date include:  - epidural steroid injection - no relief  - hip joint injection - no relief  -Of note he reports that he is anticipating receiving shoulder injections with [facility] soon    Medical history includes NSTEMI, hypertension.    Psychological testing for pain as depression and pain commonly coexist and need to both be treated.     Opioid Risk Score: 4      Interpretation of Opioid Risk Score   Score 0-3 = Low risk of abuse. Do UDS at least once per year.  Score 4-7 = Moderate risk of abuse. Do UDS 1-4 times per year.  Score 8+ = High risk of abuse. Refer to specialist.    PHQ      7/25/2021    10:00 PM 12/7/2021     4:00 PM 3/14/2023     8:00 AM   Depression Screen (PHQ-2/PHQ-9)   PHQ-2 Total Score 0 0    PHQ-2 Total Score   0       Interpretation of PHQ-9 Total Score   Score Severity   1-4 No Depression   5-9 Mild Depression   10-14 Moderate Depression   15-19 Moderately Severe Depression   20-27 Severe Depression      Medical records review:  I reviewed the note from the referring provider Clarisse Heath M.D. including the note dated 2/9/23.    ROS:   Red Flags ROS:   Fever, Chills, Sweats: Denies  Involuntary Weight Loss: Denies  Bladder Incontinence: Denies  Bowel Incontinence: denies  Saddle Anesthesia: Denies    All other systems reviewed and negative.     PMHx:   Past Medical History:   Diagnosis Date    Ascending aorta dilation (HCC)     CAD (coronary artery disease)     Chronic pain     High cholesterol     Myocardial infarct (HCC)        PSHx:   Past Surgical History:   Procedure Laterality Date    IRRIGATION & DEBRIDEMENT ORTHO Left 12/8/2021    Procedure: IRRIGATION AND DEBRIDEMENT, WOUND - HAND;  Surgeon: Guillermo Villalobos M.D.;  Location: SURGERY [facility];  Service: Orthopedics    ZZZ CARDIAC CATH  07/2021    PCI to OM1     KNEE ARTHROSCOPY      SHOULDER ARTHROSCOPY         Family Hx:   Family History   Problem Relation Age of Onset    Heart Disease  Mother     Heart Failure Mother        Social Hx:  Social History     Socioeconomic History    Marital status:      Spouse name: Not on file    Number of children: Not on file    Years of education: Not on file    Highest education level: Not on file   Occupational History    Not on file   Tobacco Use    Smoking status: Former     Types: Cigarettes     Quit date:      Years since quittin.2    Smokeless tobacco: Former     Types: Chew     Quit date:    Vaping Use    Vaping Use: Never used   Substance and Sexual Activity    Alcohol use: Not Currently    Drug use: No    Sexual activity: Not on file   Other Topics Concern    Not on file   Social History Narrative    Not on file     Social Determinants of Health     Financial Resource Strain: Not on file   Food Insecurity: Not on file   Transportation Needs: Not on file   Physical Activity: Not on file   Stress: Not on file   Social Connections: Not on file   Intimate Partner Violence: Not on file   Housing Stability: Not on file       Allergies:  No Known Allergies    Medications: reviewed on epic.   Outpatient Medications Marked as Taking for the 3/14/23 encounter (Office Visit) with Brooke Brown M.D.   Medication Sig Dispense Refill    celecoxib (CELEBREX) 200 MG Cap Take 1 Capsule by mouth 2 times daily with meals as needed for Moderate Pain or Inflammation. 90 Capsule 5    meloxicam (MOBIC) 15 MG tablet Take 1 Tablet by mouth every day. 30 Tablet 0    baclofen (LIORESAL) 5 MG Tab Take 1-2 Tablets by mouth 3 times a day. 60 Tablet 0    meloxicam (MOBIC) 15 MG tablet Take 1 Tablet by mouth every day. 30 Tablet 0    prasugrel (EFFIENT) 10 MG Tab TAKE ONE TABLET BY MOUTH EVERY DAY 90 Tablet 2    atorvastatin (LIPITOR) 80 MG tablet TAKE ONE TABLET BY MOUTH EVERY EVENING 90 Tablet 2    Cholecalciferol (VITAMIN D3) 430930 UNIT/GM Powder Take 1 Capsule by mouth every day.      Cyanocobalamin Powder Take 1 Tablet by mouth every day.      aspirin EC 81  "MG EC tablet Take 1 tablet by mouth every day. (Patient taking differently: Take 81 mg by mouth every evening.) 30 tablet 2    Ascorbic Acid (VITAMIN C PO) Take 1 tablet by mouth every day.      Cholecalciferol (D3 PO) Take 1 capsule by mouth every day.      Cyanocobalamin (B-12 PO) Take 1 tablet by mouth every day.          Current Outpatient Medications on File Prior to Visit   Medication Sig Dispense Refill    celecoxib (CELEBREX) 200 MG Cap Take 1 Capsule by mouth 2 times daily with meals as needed for Moderate Pain or Inflammation. 90 Capsule 5    meloxicam (MOBIC) 15 MG tablet Take 1 Tablet by mouth every day. 30 Tablet 0    baclofen (LIORESAL) 5 MG Tab Take 1-2 Tablets by mouth 3 times a day. 60 Tablet 0    meloxicam (MOBIC) 15 MG tablet Take 1 Tablet by mouth every day. 30 Tablet 0    prasugrel (EFFIENT) 10 MG Tab TAKE ONE TABLET BY MOUTH EVERY DAY 90 Tablet 2    atorvastatin (LIPITOR) 80 MG tablet TAKE ONE TABLET BY MOUTH EVERY EVENING 90 Tablet 2    Cholecalciferol (VITAMIN D3) 150825 UNIT/GM Powder Take 1 Capsule by mouth every day.      Cyanocobalamin Powder Take 1 Tablet by mouth every day.      aspirin EC 81 MG EC tablet Take 1 tablet by mouth every day. (Patient taking differently: Take 81 mg by mouth every evening.) 30 tablet 2    Ascorbic Acid (VITAMIN C PO) Take 1 tablet by mouth every day.      Cholecalciferol (D3 PO) Take 1 capsule by mouth every day.      Cyanocobalamin (B-12 PO) Take 1 tablet by mouth every day.      gabapentin (NEURONTIN) 100 MG Cap 1 capsule PO BID. May slowly increase to a max of 900mg PO BID (Patient not taking: Reported on 3/14/2023) 90 Capsule 2     No current facility-administered medications on file prior to visit.         EXAMINATION     Physical Exam:   /88 (BP Location: Left arm, Patient Position: Sitting, BP Cuff Size: Adult)   Pulse (!) 43   Temp 36.4 °C (97.6 °F) (Temporal)   Ht 1.727 m (5' 8\")   Wt 72.8 kg (160 lb 7.9 oz)   SpO2 96%     Constitutional: "   Body Habitus: Body mass index is 24.4 kg/m².  Cooperation: Fully cooperates with exam  Appearance: Well-groomed, well-nourished.    Eyes: No scleral icterus to suggest severe liver disease, no proptosis to suggest severe hyperthyroidism    ENT -no obvious auditory deficits, no noticeable facial droop     Skin -no rashes or lesions noted     Respiratory-  breathing comfortably on room air, no audible wheezing    Cardiovascular-distal extremities warm and well perfused.  No lower extremity edema is noted.     Gastrointestinal - no obvious abdominal masses, non-distended    Psychiatric- alert and oriented ×3. Normal affect.     Gait - normal gait, no use of ambulatory device, nonantalgic. Heel walking and toe walking intact.    Musculoskeletal and Neuro -     Thoracic/Lumbar Spine/Sacral Spine/Hips   Inspection: No evidence of atrophy in bilateral lower extremities throughout     There is full active range of motion with lumbar extension    Facet loading maneuver negative bilaterally    Palpation:   Tenderness to palpation over the lumbar facets on right spanning approximately L1-L5 levels. No tenderness to palpation elsewhere in the low back/hips including midline of lumbosacral spine, paraspinal muscles bilaterally, lumbar facets on left spanning approximately L1-L5 levels, sacroiliac joints bilaterally, PSIS bilaterally, and greater trochanters bilaterally.    Lumbar spine /hip provocative exam maneuvers  Straight leg raise negative bilaterally  FADIR test negative bilaterally    SI joint tests  EDWAR test negative bilaterally  Thigh thrust test negative bilaterally    Key points for the international standards for neurological classification of spinal cord injury (ISNCSCI) to light touch.   Dermatome R L   L2 2 2   L3 2 2   L4 2 2   L5 2 2   S1 2 2   S2 2 2       Motor Exam Lower Extremities  ? Myotome R L   Hip flexion L2 5 5   Knee extension L3 5 5   Ankle dorsiflexion L4 5 5   Toe extension L5 5 5   Ankle  plantarflexion S1 5 5       Reflexes  ?  R L   Patella  2+ 2+   Achilles   2+ 2+     Clonus of the ankle negative bilaterally       MEDICAL DECISION MAKING    Medical records review: see under HPI section.     DATA    Labs: Personally reviewed at today's visit:     Lab Results   Component Value Date/Time    SODIUM 142 05/07/2022 10:10 AM    POTASSIUM 4.4 05/07/2022 10:10 AM    CHLORIDE 107 05/07/2022 10:10 AM    CO2 25 05/07/2022 10:10 AM    ANION 10.0 05/07/2022 10:10 AM    GLUCOSE 89 05/07/2022 10:10 AM    BUN 18 05/07/2022 10:10 AM    CREATININE 1.06 05/07/2022 10:10 AM    CALCIUM 9.0 05/07/2022 10:10 AM    ASTSGOT 18 12/09/2021 04:56 AM    ALTSGPT 21 12/09/2021 04:56 AM    TBILIRUBIN 0.3 12/09/2021 04:56 AM    ALBUMIN 3.8 12/09/2021 04:56 AM    TOTPROTEIN 6.0 12/09/2021 04:56 AM    GLOBULIN 2.2 12/09/2021 04:56 AM    AGRATIO 1.7 12/09/2021 04:56 AM       Lab Results   Component Value Date/Time    PROTHROMBTM 13.2 07/26/2021 02:59 AM    INR 1.03 07/26/2021 02:59 AM        Lab Results   Component Value Date/Time    WBC 8.7 12/09/2021 04:56 AM    RBC 4.20 (L) 12/09/2021 04:56 AM    HEMOGLOBIN 13.9 (L) 12/09/2021 04:56 AM    HEMATOCRIT 40.9 (L) 12/09/2021 04:56 AM    MCV 97.4 12/09/2021 04:56 AM    MCH 33.1 (H) 12/09/2021 04:56 AM    MCHC 34.0 12/09/2021 04:56 AM    MPV 9.7 12/09/2021 04:56 AM    NEUTSPOLYS 84.90 (H) 12/09/2021 04:56 AM    LYMPHOCYTES 8.10 (L) 12/09/2021 04:56 AM    MONOCYTES 6.60 12/09/2021 04:56 AM    EOSINOPHILS 0.00 12/09/2021 04:56 AM    BASOPHILS 0.10 12/09/2021 04:56 AM        No results found for: HBA1C     Imaging:   I personally reviewed following images, these are my reads  MRI lumbar spine 12/20/2022  Spondylolisthesis at L5-S1.  Disc space narrowing at L4-5 and L5-S1.  Disc bulge most notable at L4-5 and L5-S1.  Subacute compression fracture at superior endplate of L1.  Severe left-sided neuroforaminal stenosis at L5-S1, moderate right-sided neuroforaminal stenosis at L5-S1. Severe  right-sided neuroforaminal stenosis at L4-5.  Facet arthropathy most notable at bilateral L4-5 and L5-S1. See formal radiology report for further details.    Unable to view images from XR lumbar spine 22 at the time of today's visit    IMAGING radiology reads. I reviewed the following radiology reads   MRI lumbar spine 2022        XR lumbar spine 22  AP lateral and flexion-extension views of the lumbar spine reveal severe   degenerative changes at the lumbar spine as well as what appears to be   chronic scoliosis/curvature.  These chronic findings make it difficult to   ascertain if there is an acute, nondisplaced fracture.      Diagnosis  Visit Diagnoses     ICD-10-CM   1. Lumbar spondylosis  M47.816   2. Chronic right-sided low back pain without sciatica  M54.50    G89.29   3. Other chronic pain  G89.29   4. Risk for falls  Z91.81   5. Neuroforaminal stenosis of lumbar spine  M48.061         ASSESSMENT AND PLAN:  Vasile Bowers ( 1950) is a male with history of chronic low back pain who presents with right-sided low back pain at his lumbar facets where lumbar spondylosis is seen on his MRI 2022.    Vasile was seen today for new patient.    Diagnoses and all orders for this visit:    Lumbar spondylosis  -     Referral to Pain Clinic    Chronic right-sided low back pain without sciatica    Other chronic pain    Risk for falls  -     Patient identified as fall risk.  Appropriate orders and counseling given.    Neuroforaminal stenosis of lumbar spine          PLAN  Physical Therapy: Patient has done extensive physical therapy for this issue in the past and has been diligent with his home exercise program since.  Discussed that he should continue his home exercise program as able    Home Exercise Program: I provided the patient with a home exercise program focusing on strengthening and stretching.     Diagnostic workup: Personally reviewed at today's visit:  MRI lumbar spine  "12/20/2022, X-ray lumbar spine 12/14/2022    Medications:   -The patient has tried several NSAIDs including Celebrex and Mobic, Tylenol, \"nerve blocker\" with no relief    Interventions:   -Discussed diagnostic lumbar medial branch blocks targeting right L4-5 and L5-S1 facet joints. The risks, benefits, and alternatives to this procedure were discussed and the patient wishes to proceed with the procedure. Risks include but are not limited to damage to surrounding structures, infection, bleeding, worsening of pain which can be permanent, and weakness which can be permanent. Benefits include pain relief and improved function. Alternatives include not doing the procedure.    -The patient reports he has received epidurals in the past with no relief at [place] and with Dr. Live.  Request those records today    Outside records requested:  The patient signed outside records request form for his outside records including outside images. This includes the records from  [facility], Dr. Live    Follow-up: 1 week after lumbar medial branch blocks above    Orders Placed This Encounter    Referral to Pain Clinic    Patient identified as fall risk.  Appropriate orders and counseling given.       Brooke Brown MD  Interventional Pain and Spine  Physical Medicine and Rehabilitation  [facility]     Clarisse Heath M.D.     The above note documents my personal evaluation of this patient. In addition, I have reviewed and confirmed with the patient and MA the supportive information documented in today's Patient Health Questionnaire and Office Note.     Please note that this dictation was created using voice recognition software. I have made every reasonable attempt to correct obvious errors, but I expect that there are errors of grammar and possibly content that I did not discover before finalizing the note.  "

## 2023-03-21 ENCOUNTER — HOSPITAL ENCOUNTER (OUTPATIENT)
Facility: REHABILITATION | Age: 73
End: 2023-03-21
Attending: STUDENT IN AN ORGANIZED HEALTH CARE EDUCATION/TRAINING PROGRAM | Admitting: STUDENT IN AN ORGANIZED HEALTH CARE EDUCATION/TRAINING PROGRAM
Payer: MEDICARE

## 2023-03-21 ENCOUNTER — APPOINTMENT (OUTPATIENT)
Dept: RADIOLOGY | Facility: REHABILITATION | Age: 73
End: 2023-03-21
Attending: STUDENT IN AN ORGANIZED HEALTH CARE EDUCATION/TRAINING PROGRAM
Payer: MEDICARE

## 2023-03-21 VITALS
DIASTOLIC BLOOD PRESSURE: 77 MMHG | BODY MASS INDEX: 24.06 KG/M2 | OXYGEN SATURATION: 98 % | TEMPERATURE: 98 F | WEIGHT: 158.73 LBS | RESPIRATION RATE: 16 BRPM | HEIGHT: 68 IN | HEART RATE: 44 BPM | SYSTOLIC BLOOD PRESSURE: 133 MMHG

## 2023-03-21 PROCEDURE — 700111 HCHG RX REV CODE 636 W/ 250 OVERRIDE (IP)

## 2023-03-21 PROCEDURE — 700117 HCHG RX CONTRAST REV CODE 255

## 2023-03-21 PROCEDURE — 64493 INJ PARAVERT F JNT L/S 1 LEV: CPT

## 2023-03-21 PROCEDURE — 700101 HCHG RX REV CODE 250

## 2023-03-21 PROCEDURE — 64494 INJ PARAVERT F JNT L/S 2 LEV: CPT

## 2023-03-21 RX ORDER — LIDOCAINE HYDROCHLORIDE 10 MG/ML
INJECTION, SOLUTION EPIDURAL; INFILTRATION; INTRACAUDAL; PERINEURAL
Status: COMPLETED
Start: 2023-03-21 | End: 2023-03-21

## 2023-03-21 RX ORDER — BUPIVACAINE HYDROCHLORIDE 5 MG/ML
INJECTION, SOLUTION EPIDURAL; INTRACAUDAL
Status: COMPLETED
Start: 2023-03-21 | End: 2023-03-21

## 2023-03-21 RX ADMIN — IOHEXOL 5 ML: 240 INJECTION, SOLUTION INTRATHECAL; INTRAVASCULAR; INTRAVENOUS; ORAL at 15:17

## 2023-03-21 RX ADMIN — LIDOCAINE HYDROCHLORIDE 10 ML: 10 INJECTION, SOLUTION EPIDURAL; INFILTRATION; INTRACAUDAL; PERINEURAL at 15:17

## 2023-03-21 RX ADMIN — BUPIVACAINE HYDROCHLORIDE 10 ML: 5 INJECTION, SOLUTION EPIDURAL; INTRACAUDAL; PERINEURAL at 15:18

## 2023-03-21 ASSESSMENT — PAIN DESCRIPTION - PAIN TYPE
TYPE: CHRONIC PAIN
TYPE: CHRONIC PAIN

## 2023-03-21 ASSESSMENT — FIBROSIS 4 INDEX: FIB4 SCORE: 1.79

## 2023-03-21 NOTE — OP REPORT
"Date of Service: see epic time stamp for DOS    Patient: Vasile Bowers 72 y.o. male     MRN: [id number]     Physician/s: Brooke Brown MD    Pre-operative Diagnosis: Lumbosacral spondylosis, facet arthropathy. The patient was NOT seen for lumbar radiculopathy today.     Post-operative Diagnosis: Lumbosacral spondylosis, facet arthropathy. The patient was NOT seen for lumbar radiculopathy today.     Procedure:  diagnostic lumbar medial branch blocks #1 targeting right L4-L5 and L5-S1 facet joints    Description of procedure:    The risks, benefits, and alternatives of the procedure were reviewed and discussed with the patient.  Written informed consent was freely obtained. A pre-procedural time-out was conducted by the physician verifying patient’s identity, procedure to be performed, procedure site and side, and allergy verification. Appropriate equipment was determined to be in place for the procedure.     The patient's vital signs were carefully monitored before, throughout, and after the procedure.     In the fluoroscopy suite the patient was placed in a prone position and the skin was prepped and draped in the usual sterile fashion. The fluoroscope was placed over the lower back at the appropriate angles, and the targets for injection were marked. A 27g needle was placed into each of the markings at the levels below, and approx 1cc of 1% Lidocaine was injected subcutaneously into the epidermal and dermal layers. The needle was removed intact.    Using an oblique view, A 25g 3.5\" needle was then placed at the intersection of the transverse process and superior articular process at the L4-5 facet joint where the L3 medial branch runs on the right side. The needle tips were then verified by AP, oblique, and lateral views.     Using an oblique view, A 25g 3.5\" needle was then placed at the intersection of the transverse process and superior articular process at the L5-S1 facet joint where the L4 medial branch " "runs on the right side. The needle tips were then verified by AP, oblique, and lateral views.     Using an oblique view, A 25g 3.5\" needle was then placed at the intersection of the transverse process and superior articular process at the S1 facet where the L5 dorsal ramus runs on the right side. The needle tips were then verified by AP, oblique, and lateral views.      In the AP view, less than 1 cc of contrast dye was used to highlight the medial branch while the fluoroscope was running live at the levels above. Additional oblique views were used at each level to visualize the contrast dye highlighting the medial branch. Following negative aspiration, approximately 0.5 ml. of  0.5% bupivacaine was then injected at the above levels, and the needles were removed intact after restyleted. The patient's back was covered with a 4x4 gauze, the area was cleansed with sterile normal saline, and a dressing was applied.     There were no complications noted, the patient was hemodynamically stable, and tolerated the procedure well.     Follow-up as scheduled    Brooke Brown MD  Interventional Pain and Spine  Physical Medicine and Rehabilitation  [facility]      CPT  Intraarticular joint or medial branch block (MBB) - lumbar or sacral (1st level):  87370-ei  Intraarticular joint or medial branch block (MBB) - lumbar or sacral (2nd level):  72980-bw    "

## 2023-03-21 NOTE — INTERVAL H&P NOTE
H&P reviewed. The patient was examined and there are no changes to the H&P  Brooke Brown MD  Interventional Pain and Spine  Physical Medicine and Rehabilitation  [facility]

## 2023-03-21 NOTE — PROGRESS NOTES
1357 Pt admitted to Pre Procedure area.  Consent signed and post op instructions reviewed with pt, all questions answered.   1424 Dr. Brown in to see pt.      1530 Pt received to Post Procedure area with updates from procedure RN.  Snack and drink tolerated without C/O N/V.  Dressing clear, dry, no swelling noted.    1540 Pt seen by Dr. Brown for post procedure evaluation.     1546 Pt ambulated without difficulty & meets criteria for DC, accompanied to car and placed in passenger seat.  Discharged to designated .

## 2023-03-28 ENCOUNTER — TELEPHONE (OUTPATIENT)
Dept: PHYSICAL MEDICINE AND REHAB | Facility: MEDICAL CENTER | Age: 73
End: 2023-03-28
Payer: MEDICARE

## 2023-03-28 NOTE — TELEPHONE ENCOUNTER
Called for post-sp check-up. Pt reported the following regarding the procedure site: diagnostic MBB targeting R L4-5 and L5-S1 facet joints    Change in pain?: LM    Concerns?: LM    Confirmed [facility] appt?: MARIELLE, 4/5

## 2023-04-05 ENCOUNTER — OFFICE VISIT (OUTPATIENT)
Dept: PHYSICAL MEDICINE AND REHAB | Facility: MEDICAL CENTER | Age: 73
End: 2023-04-05
Payer: MEDICARE

## 2023-04-05 VITALS
BODY MASS INDEX: 24.29 KG/M2 | WEIGHT: 160.27 LBS | SYSTOLIC BLOOD PRESSURE: 108 MMHG | DIASTOLIC BLOOD PRESSURE: 72 MMHG | TEMPERATURE: 97.2 F | HEIGHT: 68 IN | OXYGEN SATURATION: 98 % | HEART RATE: 52 BPM

## 2023-04-05 DIAGNOSIS — G89.29 CHRONIC RIGHT-SIDED LOW BACK PAIN WITHOUT SCIATICA: ICD-10-CM

## 2023-04-05 DIAGNOSIS — G89.29 OTHER CHRONIC PAIN: ICD-10-CM

## 2023-04-05 DIAGNOSIS — M48.061 NEUROFORAMINAL STENOSIS OF LUMBAR SPINE: ICD-10-CM

## 2023-04-05 DIAGNOSIS — M16.11 OSTEOARTHRITIS OF RIGHT HIP, UNSPECIFIED OSTEOARTHRITIS TYPE: ICD-10-CM

## 2023-04-05 DIAGNOSIS — M47.816 LUMBAR SPONDYLOSIS: ICD-10-CM

## 2023-04-05 DIAGNOSIS — M54.50 CHRONIC RIGHT-SIDED LOW BACK PAIN WITHOUT SCIATICA: ICD-10-CM

## 2023-04-05 DIAGNOSIS — M79.10 MYALGIA: ICD-10-CM

## 2023-04-05 PROCEDURE — 20552 NJX 1/MLT TRIGGER POINT 1/2: CPT | Performed by: STUDENT IN AN ORGANIZED HEALTH CARE EDUCATION/TRAINING PROGRAM

## 2023-04-05 PROCEDURE — 76942 ECHO GUIDE FOR BIOPSY: CPT | Performed by: STUDENT IN AN ORGANIZED HEALTH CARE EDUCATION/TRAINING PROGRAM

## 2023-04-05 PROCEDURE — 99213 OFFICE O/P EST LOW 20 MIN: CPT | Mod: 25 | Performed by: STUDENT IN AN ORGANIZED HEALTH CARE EDUCATION/TRAINING PROGRAM

## 2023-04-05 ASSESSMENT — FIBROSIS 4 INDEX: FIB4 SCORE: 1.79

## 2023-04-05 ASSESSMENT — PATIENT HEALTH QUESTIONNAIRE - PHQ9: CLINICAL INTERPRETATION OF PHQ2 SCORE: 0

## 2023-04-05 ASSESSMENT — PAIN SCALES - GENERAL: PAINLEVEL: 6=MODERATE PAIN

## 2023-04-05 NOTE — PROCEDURES
Patient Name: Vasile Bowers  : 1950  Date of Service: 2023    Physician/s: Brooke Brown MD    Pre-operative Diagnosis: Myalgia (M79.1)    Post-operative Diagnosis: Myalgia (M79.1)    Procedure: trigger point injections of the following muscles:    Site R L   Splenius capitis     Splenius cervicis     Sternocleidomastoid     Rhomboids     Levator scapulae     Pectoralis minor     Pectoralis major     Serratus anterior     Teres major/minor     Quadratus lumborum     Paravertebral, cervical     Paravertebral, thoracic     Paravertebral, lumbar     Gluteus lisa x    Gluteus medius x    Gluteus minimus     Tensor fascia lonnie     Vastus lateralis     Adductor neida     Adductor longus     Occipitalis     Cervical paraspinal     Trapezius, upper     Trapezius, mid     Trapezius, lower     Latissimus dorsi       Description of procedure:    The risks, benefits, and alternatives of the procedure were reviewed and discussed with the patient.  Written informed consent was freely obtained. A pre-procedural time-out was conducted by the physician verifying patient’s identity, procedure to be performed, procedure site and side, and allergy verification. Appropriate equipment was determined to be in place for the procedure.     In the office suite exam room the patient was placed in a prone position and the skin areas for injection over the above muscles were marked. A total of 6 areas of pain were identified for injection. The areas of pain were then prepped and draped in the usual sterile fashion. A solution was prepared with 10 mL of 1% lidocaine. Ultrasound was confirmed to view the adjacent structures for blood vessels and nerves and to confirm the needle path was not within the structures. A 27g needle was placed into each of the markings at the areas above under ultrasound guidance with an out of plane approach.. After negative aspiration, approximately 1.5-2 mL of the above solution was injected.  The needle was removed intact after each trigger point injection, and the patient's back was covered with a 4x4 gauze, the area was cleansed with sterile normal saline, and a dressing was applied. There were no complications noted.     Brooke Brown MD  Interventional Pain and Spine  Physical Medicine and Rehabilitation  [facility]

## 2023-04-05 NOTE — H&P (VIEW-ONLY)
Follow-up patient Note    Interventional Pain and Spine  Physiatry (Physical Medicine and Rehabilitation)     Patient Name: Vasile Bowers  : 1950  Date of service: 2023    Chief Complaint:   Chief Complaint   Patient presents with    Follow-Up     Lumbar spondylosis       HISTORY (3/14/2023):  Vasile Bowers is a 72 y.o. male who presents today with chronic right-sided low back pain that does not radiate down the legs.  He denies numbness, tingling, or weakness in the legs.  He attributes the pain to years of skiing, snowboarding, and surfing.  He continues to remain physically active as much as he can.  He notes that nothing has helped with his pain including epidural steroid injections or Celebrex or meloxicam or physical therapy. He denies left sided low back pain.     Pain right now is 5/10 on the numeric pain scale. His pain at its best-worse level during the course of the day is typically 2-5/10, respectively.  Pain worsens with standing and walking and improves with sitting and bending forward.  He saw Dr. Heath who felt that surgical intervention was not warranted at this time with the absence of radiating pain and instead recommended right-sided L4-5 and L5-S1 facet joint ablations.  Of note he is also seeing [facility] for his right hip and has been offered a right hip replacement.  He notes hip injections have not helped in the past.      The patient has done physical therapy for this problem, most recently about 1 year ago with no relief. Consistently does stretching component of home exercise program      Patient has tried the following medications with varied success (current meds in bold):   Celebrex -no longer taking due to no relief  Meloxicam-no longer taking due to no relief  Nerve block (unknown type) -no longer taking due to no relief  Tylenol -no relief  Ibuprofen -no relief     Therapeutic modalities and interventional therapies to date include:  - epidural steroid  injection - no relief  - hip joint injection - no relief  -Of note he reports that he is anticipating receiving shoulder injections with [facility] soon     Medical history includes NSTEMI, hypertension.    HPI  Today's visit   Vasile Bowers ( 1950) is a male with Diagnoses of Myalgia, Chronic right-sided low back pain without sciatica, Other chronic pain, Lumbar spondylosis, Osteoarthritis of right hip, unspecified osteoarthritis type, and Neuroforaminal stenosis of lumbar spine were pertinent to this visit.    Presents today after  3/21/23  diagnostic lumbar medial branch blocks #1 targeting right L4-L5 and L5-S1 facet joints with no improvement.  Ongoing focal pain at right posterior lateral glute area, unchanged with palpation.  It does not radiate.  He denies any numbness, tingling, or weakness in his leg.  He feels frustrated due to ongoing nature of pain.  He notes he has been offered a right hip replacement which he would like to avoid at this time.  Denies significant relief with hip joint steroid injections in the past.  Feels that maybe he received a trigger point injection in the past as well that did not help.  He requests to have some degree of pain relief as soon as possible.    Pain severity 7/10 currently  Pt denies new numbness, tingling, or weakness.    Procedure history:  - 3/21/23  diagnostic lumbar medial branch blocks #1 targeting right L4-L5 and L5-S1 facet joints - no improvement      ROS:   Red Flags ROS:   Fever, Chills, Sweats: Denies  Involuntary Weight Loss: Denies  Bladder Incontinence: Denies  Bowel Incontinence: denies  Saddle Anesthesia: Denies    All other systems reviewed and negative.     PMHx:   Past Medical History:   Diagnosis Date    Ascending aorta dilation (HCC)     CAD (coronary artery disease)     Chronic pain     High cholesterol     Myocardial infarct (HCC)        PSHx:   Past Surgical History:   Procedure Laterality Date    INJ,ANES LUMBAR/CERVICAL  Right 3/21/2023    Procedure: Diagnostic medial branch blocks targeting the RIGHT L4-5 and L5-S1 facet joints with fluoroscopic guidance #1;  Surgeon: Brooke Brown M.D.;  [facility];  Service: Pain Management    IRRIGATION & DEBRIDEMENT ORTHO Left 2021    Procedure: IRRIGATION AND DEBRIDEMENT, WOUND - HAND;  Surgeon: Guillermo Villalobos M.D.;  Location: SURGERY [facility];  Service: Orthopedics    [facility] CARDIAC CATH  2021    PCI to OM1     KNEE ARTHROSCOPY      SHOULDER ARTHROSCOPY         Family Hx:   Family History   Problem Relation Age of Onset    Heart Disease Mother     Heart Failure Mother        Social Hx:  Social History     Socioeconomic History    Marital status:      Spouse name: Not on file    Number of children: Not on file    Years of education: Not on file    Highest education level: Not on file   Occupational History    Not on file   Tobacco Use    Smoking status: Former     Types: Cigarettes     Quit date:      Years since quittin.2    Smokeless tobacco: Former     Types: Chew     Quit date:    Vaping Use    Vaping Use: Never used   Substance and Sexual Activity    Alcohol use: Not Currently    Drug use: No    Sexual activity: Not on file   Other Topics Concern    Not on file   Social History Narrative    Not on file     Social Determinants of Health     Financial Resource Strain: Not on file   Food Insecurity: Not on file   Transportation Needs: Not on file   Physical Activity: Not on file   Stress: Not on file   Social Connections: Not on file   Intimate Partner Violence: Not on file   Housing Stability: Not on file       Allergies:  No Known Allergies    Medications: reviewed on epic.   Outpatient Medications Marked as Taking for the 23 encounter (Office Visit) with Brooke Brown M.D.   Medication Sig Dispense Refill    celecoxib (CELEBREX) 200 MG Cap Take 1 Capsule by mouth 2 times daily with meals as needed for Moderate Pain  or Inflammation. 90 Capsule 5    meloxicam (MOBIC) 15 MG tablet Take 1 Tablet by mouth every day. 30 Tablet 0    baclofen (LIORESAL) 5 MG Tab Take 1-2 Tablets by mouth 3 times a day. 60 Tablet 0    meloxicam (MOBIC) 15 MG tablet Take 1 Tablet by mouth every day. 30 Tablet 0    prasugrel (EFFIENT) 10 MG Tab TAKE ONE TABLET BY MOUTH EVERY DAY 90 Tablet 2    atorvastatin (LIPITOR) 80 MG tablet TAKE ONE TABLET BY MOUTH EVERY EVENING 90 Tablet 2    Cholecalciferol (VITAMIN D3) 579857 UNIT/GM Powder Take 1 Capsule by mouth every day.      Cyanocobalamin Powder Take 1 Tablet by mouth every day.      aspirin EC 81 MG EC tablet Take 1 tablet by mouth every day. (Patient taking differently: Take 81 mg by mouth every evening.) 30 tablet 2    Ascorbic Acid (VITAMIN C PO) Take 1 tablet by mouth every day.      Cholecalciferol (D3 PO) Take 1 capsule by mouth every day.      Cyanocobalamin (B-12 PO) Take 1 tablet by mouth every day.          Current Outpatient Medications on File Prior to Visit   Medication Sig Dispense Refill    celecoxib (CELEBREX) 200 MG Cap Take 1 Capsule by mouth 2 times daily with meals as needed for Moderate Pain or Inflammation. 90 Capsule 5    meloxicam (MOBIC) 15 MG tablet Take 1 Tablet by mouth every day. 30 Tablet 0    baclofen (LIORESAL) 5 MG Tab Take 1-2 Tablets by mouth 3 times a day. 60 Tablet 0    meloxicam (MOBIC) 15 MG tablet Take 1 Tablet by mouth every day. 30 Tablet 0    prasugrel (EFFIENT) 10 MG Tab TAKE ONE TABLET BY MOUTH EVERY DAY 90 Tablet 2    atorvastatin (LIPITOR) 80 MG tablet TAKE ONE TABLET BY MOUTH EVERY EVENING 90 Tablet 2    Cholecalciferol (VITAMIN D3) 983903 UNIT/GM Powder Take 1 Capsule by mouth every day.      Cyanocobalamin Powder Take 1 Tablet by mouth every day.      aspirin EC 81 MG EC tablet Take 1 tablet by mouth every day. (Patient taking differently: Take 81 mg by mouth every evening.) 30 tablet 2    Ascorbic Acid (VITAMIN C PO) Take 1 tablet by mouth every day.       "Cholecalciferol (D3 PO) Take 1 capsule by mouth every day.      Cyanocobalamin (B-12 PO) Take 1 tablet by mouth every day.      gabapentin (NEURONTIN) 100 MG Cap 1 capsule PO BID. May slowly increase to a max of 900mg PO BID (Patient not taking: Reported on 3/14/2023) 90 Capsule 2     No current facility-administered medications on file prior to visit.         EXAMINATION     Physical Exam:   /72 (BP Location: Left arm, Patient Position: Sitting, BP Cuff Size: Adult)   Pulse (!) 52   Temp 36.2 °C (97.2 °F) (Temporal)   Ht 1.727 m (5' 8\")   Wt 72.7 kg (160 lb 4.4 oz)   SpO2 98%     Constitutional:   Body Habitus: Body mass index is 24.37 kg/m².  Cooperation: Fully cooperates with exam  Appearance: Well-groomed, well-nourished.    Eyes: No scleral icterus to suggest severe liver disease, no proptosis to suggest severe hyperthyroidism    ENT -no obvious auditory deficits, no noticeable facial droop     Skin -no rashes or lesions noted     Respiratory-  breathing comfortably on room air, no audible wheezing    Cardiovascular-distal extremities warm and well perfused.  No lower extremity edema is noted.     Gastrointestinal - no obvious abdominal masses, non-distended    Psychiatric- alert and oriented ×3. Normal affect.     Gait - normal gait, no use of ambulatory device, nonantalgic. Heel walking and toe walking intact.     Musculoskeletal and Neuro -      Thoracic/Lumbar Spine/Sacral Spine/Hips   Inspection: No evidence of atrophy in bilateral lower extremities throughout       Palpation:   Focal tenderness to palpation over the right posterolateral glute.  No tenderness to palpation over lumbar facets on right spanning approximately L1-L5 levels. No tenderness to palpation elsewhere in the low back/hips including midline of lumbosacral spine, paraspinal muscles bilaterally, lumbar facets on left spanning approximately L1-L5 levels, sacroiliac joints bilaterally, PSIS bilaterally, and greater trochanters " bilaterally.     Lumbar spine /hip provocative exam maneuvers  Straight leg raise negative bilaterally  FADIR test negative bilaterally     SI joint tests  EDWAR test negative bilaterally  Thigh thrust test negative bilaterally     Key points for the international standards for neurological classification of spinal cord injury (ISNCSCI) to light touch.   Dermatome R L   L2 2 2   L3 2 2   L4 2 2   L5 2 2   S1 2 2   S2 2 2         Motor Exam Lower Extremities  ? Myotome R L   Hip flexion L2 5 5   Knee extension L3 5 5   Ankle dorsiflexion L4 5 5   Toe extension L5 5 5   Ankle plantarflexion S1 5 5      Previous exam  There is full active range of motion with lumbar extension     Facet loading maneuver negative bilaterally    Reflexes  ?   R L   Patella   2+ 2+   Achilles    2+ 2+      Clonus of the ankle negative bilaterally          MEDICAL DECISION MAKING    Medical records review: see under HPI section.     DATA    Labs: No new labs available for review since last visit.   Lab Results   Component Value Date/Time    SODIUM 142 05/07/2022 10:10 AM    POTASSIUM 4.4 05/07/2022 10:10 AM    CHLORIDE 107 05/07/2022 10:10 AM    CO2 25 05/07/2022 10:10 AM    ANION 10.0 05/07/2022 10:10 AM    GLUCOSE 89 05/07/2022 10:10 AM    BUN 18 05/07/2022 10:10 AM    CREATININE 1.06 05/07/2022 10:10 AM    CALCIUM 9.0 05/07/2022 10:10 AM    ASTSGOT 18 12/09/2021 04:56 AM    ALTSGPT 21 12/09/2021 04:56 AM    TBILIRUBIN 0.3 12/09/2021 04:56 AM    ALBUMIN 3.8 12/09/2021 04:56 AM    TOTPROTEIN 6.0 12/09/2021 04:56 AM    GLOBULIN 2.2 12/09/2021 04:56 AM    AGRATIO 1.7 12/09/2021 04:56 AM       Lab Results   Component Value Date/Time    PROTHROMBTM 13.2 07/26/2021 02:59 AM    INR 1.03 07/26/2021 02:59 AM        Lab Results   Component Value Date/Time    WBC 8.7 12/09/2021 04:56 AM    RBC 4.20 (L) 12/09/2021 04:56 AM    HEMOGLOBIN 13.9 (L) 12/09/2021 04:56 AM    HEMATOCRIT 40.9 (L) 12/09/2021 04:56 AM    MCV 97.4 12/09/2021 04:56 AM    MCH 33.1  (H) 2021 04:56 AM    MCHC 34.0 2021 04:56 AM    MPV 9.7 2021 04:56 AM    NEUTSPOLYS 84.90 (H) 2021 04:56 AM    LYMPHOCYTES 8.10 (L) 2021 04:56 AM    MONOCYTES 6.60 2021 04:56 AM    EOSINOPHILS 0.00 2021 04:56 AM    BASOPHILS 0.10 2021 04:56 AM        No results found for: HBA1C     Imaging:   I personally reviewed following images, these are my reads  MRI lumbar spine 2022  Spondylolisthesis at L5-S1.  Disc space narrowing at L4-5 and L5-S1.  Disc bulge most notable at L4-5 and L5-S1.  Subacute compression fracture at superior endplate of L1.  Severe left-sided neuroforaminal stenosis at L5-S1, moderate right-sided neuroforaminal stenosis at L5-S1. Severe right-sided neuroforaminal stenosis at L4-5.  Facet arthropathy most notable at bilateral L4-5 and L5-S1. See formal radiology report for further details.     Unable to view images from XR lumbar spine 22 at the time of today's visit     IMAGING radiology reads. I reviewed the following radiology reads   MRI lumbar spine 2022         XR lumbar spine 22  AP lateral and flexion-extension views of the lumbar spine reveal severe   degenerative changes at the lumbar spine as well as what appears to be   chronic scoliosis/curvature.  These chronic findings make it difficult to   ascertain if there is an acute, nondisplaced fracture.           Diagnosis  Visit Diagnoses     ICD-10-CM   1. Myalgia  M79.10   2. Chronic right-sided low back pain without sciatica  M54.50    G89.29   3. Other chronic pain  G89.29   4. Lumbar spondylosis  M47.816   5. Osteoarthritis of right hip, unspecified osteoarthritis type  M16.11   6. Neuroforaminal stenosis of lumbar spine  M48.061         ASSESSMENT AND PLAN:  Vasile Bowers (: 1950) is a male with  history of chronic low back pain who presents with worst pain at focal posterior lateral glute that corresponds to likely myalgia in etiology, but also  "possibly right hip joint osteoarthritis.  He has been offered a right hip replacement and would like to defer this if possible.     Vasile was seen today for follow-up.    Diagnoses and all orders for this visit:    Myalgia  -     Referral to Pain Clinic  -     Consent for all Surgical, Special Diagnostic or Therapeutic Procedures    Chronic right-sided low back pain without sciatica    Other chronic pain    Lumbar spondylosis    Osteoarthritis of right hip, unspecified osteoarthritis type    Neuroforaminal stenosis of lumbar spine          PLAN  Physical Therapy: Patient has done extensive physical therapy for this issue in the past and has been diligent with his home exercise program since.  Discussed that he should continue his home exercise program as able     Home Exercise Program: I previously provided the patient with a home exercise program focusing on strengthening and stretching.      Diagnostic workup: Personally reviewed at today's visit:  MRI lumbar spine 12/20/2022, X-ray lumbar spine 12/14/2022     Medications:   -The patient has tried several NSAIDs including Celebrex and Mobic, Tylenol, \"nerve blocker\" with no relief     Interventions:   -Trigger point injections targeting focal area of pain at right posterolateral glute today. The risks, benefits, and alternatives to this procedure were discussed and the patient wishes to proceed with the procedure. Risks include but are not limited to damage to surrounding structures, infection, bleeding, worsening of pain which can be permanent, and weakness which can be permanent. Benefits include pain relief and improved function. Alternatives include not doing the procedure.    -S/p diagnostic lumbar medial branch blocks targeting right L4-5 and L5-S1 facet joints with no significant improvement.  -The patient reports he has received epidurals in the past with no relief at [facility] and with Dr. Live.       Outside records requested:  The patient signed " outside records request form for his outside records including outside images. This includes the records from  [facility], Dr. Live    Follow-up: I discussed that the patient should message me in a few days with the effect of today's injection.  Discussed that if it has no effect, would likely proceed with hip joint steroid injection as the next step.  Discussed that if it does help, would likely consider repeating this injection when the effect wears off.  He expressed understanding and agreed to proceed with this plan.    Orders Placed This Encounter    Referral to Pain Clinic    Consent for all Surgical, Special Diagnostic or Therapeutic Procedures       Brooke Brown MD  Interventional Pain and Spine  Physical Medicine and Rehabilitation  [facility]      The above note documents my personal evaluation of this patient. In addition, I have reviewed and confirmed with the patient and MA the supportive information documented in today's Patient Health Questionnaire and Office Note.     Please note that this dictation was created using voice recognition software. I have made every reasonable attempt to correct obvious errors, but I expect that there are errors of grammar and possibly content that I did not discover before finalizing the note.

## 2023-04-05 NOTE — PROGRESS NOTES
Follow-up patient Note    Interventional Pain and Spine  Physiatry (Physical Medicine and Rehabilitation)     Patient Name: Vasile Bowers  : 1950  Date of service: 2023    Chief Complaint:   Chief Complaint   Patient presents with    Follow-Up     Lumbar spondylosis       HISTORY (3/14/2023):  Vasile Bowers is a 72 y.o. male who presents today with chronic right-sided low back pain that does not radiate down the legs.  He denies numbness, tingling, or weakness in the legs.  He attributes the pain to years of skiing, snowboarding, and surfing.  He continues to remain physically active as much as he can.  He notes that nothing has helped with his pain including epidural steroid injections or Celebrex or meloxicam or physical therapy. He denies left sided low back pain.     Pain right now is 5/10 on the numeric pain scale. His pain at its best-worse level during the course of the day is typically 2-5/10, respectively.  Pain worsens with standing and walking and improves with sitting and bending forward.  He saw Dr. Heath who felt that surgical intervention was not warranted at this time with the absence of radiating pain and instead recommended right-sided L4-5 and L5-S1 facet joint ablations.  Of note he is also seeing [facility] for his right hip and has been offered a right hip replacement.  He notes hip injections have not helped in the past.      The patient has done physical therapy for this problem, most recently about 1 year ago with no relief. Consistently does stretching component of home exercise program      Patient has tried the following medications with varied success (current meds in bold):   Celebrex -no longer taking due to no relief  Meloxicam-no longer taking due to no relief  Nerve block (unknown type) -no longer taking due to no relief  Tylenol -no relief  Ibuprofen -no relief     Therapeutic modalities and interventional therapies to date include:  - epidural steroid  injection - no relief  - hip joint injection - no relief  -Of note he reports that he is anticipating receiving shoulder injections with [facility] soon     Medical history includes NSTEMI, hypertension.    HPI  Today's visit   Vasile Bowers ( 1950) is a male with Diagnoses of Myalgia, Chronic right-sided low back pain without sciatica, Other chronic pain, Lumbar spondylosis, Osteoarthritis of right hip, unspecified osteoarthritis type, and Neuroforaminal stenosis of lumbar spine were pertinent to this visit.    Presents today after  3/21/23  diagnostic lumbar medial branch blocks #1 targeting right L4-L5 and L5-S1 facet joints with no improvement.  Ongoing focal pain at right posterior lateral glute area, unchanged with palpation.  It does not radiate.  He denies any numbness, tingling, or weakness in his leg.  He feels frustrated due to ongoing nature of pain.  He notes he has been offered a right hip replacement which he would like to avoid at this time.  Denies significant relief with hip joint steroid injections in the past.  Feels that maybe he received a trigger point injection in the past as well that did not help.  He requests to have some degree of pain relief as soon as possible.    Pain severity 7/10 currently  Pt denies new numbness, tingling, or weakness.    Procedure history:  - 3/21/23  diagnostic lumbar medial branch blocks #1 targeting right L4-L5 and L5-S1 facet joints - no improvement      ROS:   Red Flags ROS:   Fever, Chills, Sweats: Denies  Involuntary Weight Loss: Denies  Bladder Incontinence: Denies  Bowel Incontinence: denies  Saddle Anesthesia: Denies    All other systems reviewed and negative.     PMHx:   Past Medical History:   Diagnosis Date    Ascending aorta dilation (HCC)     CAD (coronary artery disease)     Chronic pain     High cholesterol     Myocardial infarct (HCC)        PSHx:   Past Surgical History:   Procedure Laterality Date    INJ,ANES LUMBAR/CERVICAL  Right 3/21/2023    Procedure: Diagnostic medial branch blocks targeting the RIGHT L4-5 and L5-S1 facet joints with fluoroscopic guidance #1;  Surgeon: Brooke Brown M.D.;  [facility];  Service: Pain Management    IRRIGATION & DEBRIDEMENT ORTHO Left 2021    Procedure: IRRIGATION AND DEBRIDEMENT, WOUND - HAND;  Surgeon: Guillermo Villalobos M.D.;  Location: SUR[facility]ERY [facility];  Service: Orthopedics    [facility] CARDIAC CATH  2021    PCI to OM1     KNEE ARTHROSCOPY      SHOULDER ARTHROSCOPY         Family Hx:   Family History   Problem Relation Age of Onset    Heart Disease Mother     Heart Failure Mother        Social Hx:  Social History     Socioeconomic History    Marital status:      Spouse name: Not on file    Number of children: Not on file    Years of education: Not on file    Highest education level: Not on file   Occupational History    Not on file   Tobacco Use    Smoking status: Former     Types: Cigarettes     Quit date:      Years since quittin.2    Smokeless tobacco: Former     Types: Chew     Quit date:    Vaping Use    Vaping Use: Never used   Substance and Sexual Activity    Alcohol use: Not Currently    Drug use: No    Sexual activity: Not on file   Other Topics Concern    Not on file   Social History Narrative    Not on file     Social Determinants of Health     Financial Resource Strain: Not on file   Food Insecurity: Not on file   Transportation Needs: Not on file   Physical Activity: Not on file   Stress: Not on file   Social Connections: Not on file   Intimate Partner Violence: Not on file   Housing Stability: Not on file       Allergies:  No Known Allergies    Medications: reviewed on epic.   Outpatient Medications Marked as Taking for the 23 encounter (Office Visit) with Brooke Brown M.D.   Medication Sig Dispense Refill    celecoxib (CELEBREX) 200 MG Cap Take 1 Capsule by mouth 2 times daily with meals as needed for Moderate Pain  or Inflammation. 90 Capsule 5    meloxicam (MOBIC) 15 MG tablet Take 1 Tablet by mouth every day. 30 Tablet 0    baclofen (LIORESAL) 5 MG Tab Take 1-2 Tablets by mouth 3 times a day. 60 Tablet 0    meloxicam (MOBIC) 15 MG tablet Take 1 Tablet by mouth every day. 30 Tablet 0    prasugrel (EFFIENT) 10 MG Tab TAKE ONE TABLET BY MOUTH EVERY DAY 90 Tablet 2    atorvastatin (LIPITOR) 80 MG tablet TAKE ONE TABLET BY MOUTH EVERY EVENING 90 Tablet 2    Cholecalciferol (VITAMIN D3) 564279 UNIT/GM Powder Take 1 Capsule by mouth every day.      Cyanocobalamin Powder Take 1 Tablet by mouth every day.      aspirin EC 81 MG EC tablet Take 1 tablet by mouth every day. (Patient taking differently: Take 81 mg by mouth every evening.) 30 tablet 2    Ascorbic Acid (VITAMIN C PO) Take 1 tablet by mouth every day.      Cholecalciferol (D3 PO) Take 1 capsule by mouth every day.      Cyanocobalamin (B-12 PO) Take 1 tablet by mouth every day.          Current Outpatient Medications on File Prior to Visit   Medication Sig Dispense Refill    celecoxib (CELEBREX) 200 MG Cap Take 1 Capsule by mouth 2 times daily with meals as needed for Moderate Pain or Inflammation. 90 Capsule 5    meloxicam (MOBIC) 15 MG tablet Take 1 Tablet by mouth every day. 30 Tablet 0    baclofen (LIORESAL) 5 MG Tab Take 1-2 Tablets by mouth 3 times a day. 60 Tablet 0    meloxicam (MOBIC) 15 MG tablet Take 1 Tablet by mouth every day. 30 Tablet 0    prasugrel (EFFIENT) 10 MG Tab TAKE ONE TABLET BY MOUTH EVERY DAY 90 Tablet 2    atorvastatin (LIPITOR) 80 MG tablet TAKE ONE TABLET BY MOUTH EVERY EVENING 90 Tablet 2    Cholecalciferol (VITAMIN D3) 332313 UNIT/GM Powder Take 1 Capsule by mouth every day.      Cyanocobalamin Powder Take 1 Tablet by mouth every day.      aspirin EC 81 MG EC tablet Take 1 tablet by mouth every day. (Patient taking differently: Take 81 mg by mouth every evening.) 30 tablet 2    Ascorbic Acid (VITAMIN C PO) Take 1 tablet by mouth every day.       "Cholecalciferol (D3 PO) Take 1 capsule by mouth every day.      Cyanocobalamin (B-12 PO) Take 1 tablet by mouth every day.      gabapentin (NEURONTIN) 100 MG Cap 1 capsule PO BID. May slowly increase to a max of 900mg PO BID (Patient not taking: Reported on 3/14/2023) 90 Capsule 2     No current facility-administered medications on file prior to visit.         EXAMINATION     Physical Exam:   /72 (BP Location: Left arm, Patient Position: Sitting, BP Cuff Size: Adult)   Pulse (!) 52   Temp 36.2 °C (97.2 °F) (Temporal)   Ht 1.727 m (5' 8\")   Wt 72.7 kg (160 lb 4.4 oz)   SpO2 98%     Constitutional:   Body Habitus: Body mass index is 24.37 kg/m².  Cooperation: Fully cooperates with exam  Appearance: Well-groomed, well-nourished.    Eyes: No scleral icterus to suggest severe liver disease, no proptosis to suggest severe hyperthyroidism    ENT -no obvious auditory deficits, no noticeable facial droop     Skin -no rashes or lesions noted     Respiratory-  breathing comfortably on room air, no audible wheezing    Cardiovascular-distal extremities warm and well perfused.  No lower extremity edema is noted.     Gastrointestinal - no obvious abdominal masses, non-distended    Psychiatric- alert and oriented ×3. Normal affect.     Gait - normal gait, no use of ambulatory device, nonantalgic. Heel walking and toe walking intact.     Musculoskeletal and Neuro -      Thoracic/Lumbar Spine/Sacral Spine/Hips   Inspection: No evidence of atrophy in bilateral lower extremities throughout       Palpation:   Focal tenderness to palpation over the right posterolateral glute.  No tenderness to palpation over lumbar facets on right spanning approximately L1-L5 levels. No tenderness to palpation elsewhere in the low back/hips including midline of lumbosacral spine, paraspinal muscles bilaterally, lumbar facets on left spanning approximately L1-L5 levels, sacroiliac joints bilaterally, PSIS bilaterally, and greater trochanters " bilaterally.     Lumbar spine /hip provocative exam maneuvers  Straight leg raise negative bilaterally  FADIR test negative bilaterally     SI joint tests  EDWAR test negative bilaterally  Thigh thrust test negative bilaterally     Key points for the international standards for neurological classification of spinal cord injury (ISNCSCI) to light touch.   Dermatome R L   L2 2 2   L3 2 2   L4 2 2   L5 2 2   S1 2 2   S2 2 2         Motor Exam Lower Extremities  ? Myotome R L   Hip flexion L2 5 5   Knee extension L3 5 5   Ankle dorsiflexion L4 5 5   Toe extension L5 5 5   Ankle plantarflexion S1 5 5      Previous exam  There is full active range of motion with lumbar extension     Facet loading maneuver negative bilaterally    Reflexes  ?   R L   Patella   2+ 2+   Achilles    2+ 2+      Clonus of the ankle negative bilaterally          MEDICAL DECISION MAKING    Medical records review: see under HPI section.     DATA    Labs: No new labs available for review since last visit.   Lab Results   Component Value Date/Time    SODIUM 142 05/07/2022 10:10 AM    POTASSIUM 4.4 05/07/2022 10:10 AM    CHLORIDE 107 05/07/2022 10:10 AM    CO2 25 05/07/2022 10:10 AM    ANION 10.0 05/07/2022 10:10 AM    GLUCOSE 89 05/07/2022 10:10 AM    BUN 18 05/07/2022 10:10 AM    CREATININE 1.06 05/07/2022 10:10 AM    CALCIUM 9.0 05/07/2022 10:10 AM    ASTSGOT 18 12/09/2021 04:56 AM    ALTSGPT 21 12/09/2021 04:56 AM    TBILIRUBIN 0.3 12/09/2021 04:56 AM    ALBUMIN 3.8 12/09/2021 04:56 AM    TOTPROTEIN 6.0 12/09/2021 04:56 AM    GLOBULIN 2.2 12/09/2021 04:56 AM    AGRATIO 1.7 12/09/2021 04:56 AM       Lab Results   Component Value Date/Time    PROTHROMBTM 13.2 07/26/2021 02:59 AM    INR 1.03 07/26/2021 02:59 AM        Lab Results   Component Value Date/Time    WBC 8.7 12/09/2021 04:56 AM    RBC 4.20 (L) 12/09/2021 04:56 AM    HEMOGLOBIN 13.9 (L) 12/09/2021 04:56 AM    HEMATOCRIT 40.9 (L) 12/09/2021 04:56 AM    MCV 97.4 12/09/2021 04:56 AM    MCH 33.1  (H) 2021 04:56 AM    MCHC 34.0 2021 04:56 AM    MPV 9.7 2021 04:56 AM    NEUTSPOLYS 84.90 (H) 2021 04:56 AM    LYMPHOCYTES 8.10 (L) 2021 04:56 AM    MONOCYTES 6.60 2021 04:56 AM    EOSINOPHILS 0.00 2021 04:56 AM    BASOPHILS 0.10 2021 04:56 AM        No results found for: HBA1C     Imaging:   I personally reviewed following images, these are my reads  MRI lumbar spine 2022  Spondylolisthesis at L5-S1.  Disc space narrowing at L4-5 and L5-S1.  Disc bulge most notable at L4-5 and L5-S1.  Subacute compression fracture at superior endplate of L1.  Severe left-sided neuroforaminal stenosis at L5-S1, moderate right-sided neuroforaminal stenosis at L5-S1. Severe right-sided neuroforaminal stenosis at L4-5.  Facet arthropathy most notable at bilateral L4-5 and L5-S1. See formal radiology report for further details.     Unable to view images from XR lumbar spine 22 at the time of today's visit     IMAGING radiology reads. I reviewed the following radiology reads   MRI lumbar spine 2022         XR lumbar spine 22  AP lateral and flexion-extension views of the lumbar spine reveal severe   degenerative changes at the lumbar spine as well as what appears to be   chronic scoliosis/curvature.  These chronic findings make it difficult to   ascertain if there is an acute, nondisplaced fracture.           Diagnosis  Visit Diagnoses     ICD-10-CM   1. Myalgia  M79.10   2. Chronic right-sided low back pain without sciatica  M54.50    G89.29   3. Other chronic pain  G89.29   4. Lumbar spondylosis  M47.816   5. Osteoarthritis of right hip, unspecified osteoarthritis type  M16.11   6. Neuroforaminal stenosis of lumbar spine  M48.061         ASSESSMENT AND PLAN:  Vasile Bowers (: 1950) is a male with  history of chronic low back pain who presents with worst pain at focal posterior lateral glute that corresponds to likely myalgia in etiology, but also  "possibly right hip joint osteoarthritis.  He has been offered a right hip replacement and would like to defer this if possible.     Vasile was seen today for follow-up.    Diagnoses and all orders for this visit:    Myalgia  -     Referral to Pain Clinic  -     Consent for all Surgical, Special Diagnostic or Therapeutic Procedures    Chronic right-sided low back pain without sciatica    Other chronic pain    Lumbar spondylosis    Osteoarthritis of right hip, unspecified osteoarthritis type    Neuroforaminal stenosis of lumbar spine          PLAN  Physical Therapy: Patient has done extensive physical therapy for this issue in the past and has been diligent with his home exercise program since.  Discussed that he should continue his home exercise program as able     Home Exercise Program: I previously provided the patient with a home exercise program focusing on strengthening and stretching.      Diagnostic workup: Personally reviewed at today's visit:  MRI lumbar spine 12/20/2022, X-ray lumbar spine 12/14/2022     Medications:   -The patient has tried several NSAIDs including Celebrex and Mobic, Tylenol, \"nerve blocker\" with no relief     Interventions:   -Trigger point injections targeting focal area of pain at right posterolateral glute today. The risks, benefits, and alternatives to this procedure were discussed and the patient wishes to proceed with the procedure. Risks include but are not limited to damage to surrounding structures, infection, bleeding, worsening of pain which can be permanent, and weakness which can be permanent. Benefits include pain relief and improved function. Alternatives include not doing the procedure.    -S/p diagnostic lumbar medial branch blocks targeting right L4-5 and L5-S1 facet joints with no significant improvement.  -The patient reports he has received epidurals in the past with no relief at [facility] and with Dr. Live.       Outside records requested:  The patient signed " outside records request form for his outside records including outside images. This includes the records from  [facility], Dr. Live    Follow-up: I discussed that the patient should message me in a few days with the effect of today's injection.  Discussed that if it has no effect, would likely proceed with hip joint steroid injection as the next step.  Discussed that if it does help, would likely consider repeating this injection when the effect wears off.  He expressed understanding and agreed to proceed with this plan.    Orders Placed This Encounter    Referral to Pain Clinic    Consent for all Surgical, Special Diagnostic or Therapeutic Procedures       Brooke Brown MD  Interventional Pain and Spine  Physical Medicine and Rehabilitation  [facility]      The above note documents my personal evaluation of this patient. In addition, I have reviewed and confirmed with the patient and MA the supportive information documented in today's Patient Health Questionnaire and Office Note.     Please note that this dictation was created using voice recognition software. I have made every reasonable attempt to correct obvious errors, but I expect that there are errors of grammar and possibly content that I did not discover before finalizing the note.

## 2023-04-07 DIAGNOSIS — M16.11 OSTEOARTHRITIS OF RIGHT HIP, UNSPECIFIED OSTEOARTHRITIS TYPE: ICD-10-CM

## 2023-04-07 DIAGNOSIS — M25.551 RIGHT HIP PAIN: Primary | ICD-10-CM

## 2023-04-11 ENCOUNTER — HOSPITAL ENCOUNTER (OUTPATIENT)
Facility: REHABILITATION | Age: 73
End: 2023-04-11
Attending: STUDENT IN AN ORGANIZED HEALTH CARE EDUCATION/TRAINING PROGRAM | Admitting: STUDENT IN AN ORGANIZED HEALTH CARE EDUCATION/TRAINING PROGRAM
Payer: MEDICARE

## 2023-04-11 ENCOUNTER — APPOINTMENT (OUTPATIENT)
Dept: RADIOLOGY | Facility: REHABILITATION | Age: 73
End: 2023-04-11
Attending: STUDENT IN AN ORGANIZED HEALTH CARE EDUCATION/TRAINING PROGRAM
Payer: MEDICARE

## 2023-04-11 VITALS
HEART RATE: 39 BPM | WEIGHT: 160.5 LBS | OXYGEN SATURATION: 99 % | TEMPERATURE: 97.7 F | SYSTOLIC BLOOD PRESSURE: 134 MMHG | BODY MASS INDEX: 24.32 KG/M2 | DIASTOLIC BLOOD PRESSURE: 74 MMHG | HEIGHT: 68 IN | RESPIRATION RATE: 14 BRPM

## 2023-04-11 PROCEDURE — 77002 NEEDLE LOCALIZATION BY XRAY: CPT

## 2023-04-11 PROCEDURE — 700117 HCHG RX CONTRAST REV CODE 255

## 2023-04-11 PROCEDURE — 20610 DRAIN/INJ JOINT/BURSA W/O US: CPT

## 2023-04-11 PROCEDURE — 700111 HCHG RX REV CODE 636 W/ 250 OVERRIDE (IP)

## 2023-04-11 RX ORDER — DEXAMETHASONE SODIUM PHOSPHATE 10 MG/ML
INJECTION, SOLUTION INTRAMUSCULAR; INTRAVENOUS
Status: COMPLETED
Start: 2023-04-11 | End: 2023-04-11

## 2023-04-11 RX ORDER — VIT C/B6/B5/MAGNESIUM/HERB 173 50-5-6-5MG
1 CAPSULE ORAL DAILY
COMMUNITY

## 2023-04-11 RX ORDER — ROPIVACAINE HYDROCHLORIDE 5 MG/ML
INJECTION, SOLUTION EPIDURAL; INFILTRATION; PERINEURAL
Status: COMPLETED
Start: 2023-04-11 | End: 2023-04-11

## 2023-04-11 RX ORDER — LIDOCAINE HYDROCHLORIDE 20 MG/ML
INJECTION, SOLUTION EPIDURAL; INFILTRATION; INTRACAUDAL; PERINEURAL
Status: COMPLETED
Start: 2023-04-11 | End: 2023-04-11

## 2023-04-11 RX ORDER — LIDOCAINE HYDROCHLORIDE 10 MG/ML
INJECTION, SOLUTION EPIDURAL; INFILTRATION; INTRACAUDAL; PERINEURAL
Status: COMPLETED
Start: 2023-04-11 | End: 2023-04-11

## 2023-04-11 RX ADMIN — DEXAMETHASONE SODIUM PHOSPHATE 10 MG: 10 INJECTION INTRAMUSCULAR; INTRAVENOUS at 15:00

## 2023-04-11 RX ADMIN — IOHEXOL 3 ML: 240 INJECTION, SOLUTION INTRATHECAL; INTRAVASCULAR; INTRAVENOUS; ORAL at 15:00

## 2023-04-11 RX ADMIN — ROPIVACAINE HYDROCHLORIDE 3 ML: 5 INJECTION, SOLUTION EPIDURAL; INFILTRATION; PERINEURAL at 14:58

## 2023-04-11 RX ADMIN — LIDOCAINE HYDROCHLORIDE 10 ML: 10 INJECTION, SOLUTION EPIDURAL; INFILTRATION; INTRACAUDAL; PERINEURAL at 14:57

## 2023-04-11 ASSESSMENT — PAIN DESCRIPTION - PAIN TYPE: TYPE: CHRONIC PAIN

## 2023-04-11 ASSESSMENT — FIBROSIS 4 INDEX: FIB4 SCORE: 1.79

## 2023-04-11 NOTE — PROGRESS NOTES
1415: Dr. Brown into see patient, questions answered, d/c instructions given with understanding.

## 2023-04-11 NOTE — OP REPORT
"  Date of Service: 4/11/2023     Patient: Vasile Bowers 72 y.o. male     MRN: [id number]     Physician/s: Brooke Brown MD    Pre-operative Diagnosis: right  hip osteoarthritis    Post-operative Diagnosis: right hip osteoarthritis    Procedure: right diagnostic and therapeutic intra-articular Hip injection with fluoroscopic guidance    Description of procedure:    The risks, benefits, and alternatives of the procedure were reviewed and discussed with the patient.  Written informed consent was freely obtained. A pre-procedural time-out was conducted by the physician verifying patient’s identity, procedure to be performed, procedure site and side, and allergy verification. Appropriate equipment was determined to be in place for the procedure.     The entire procedure took place lateral to the femoral vessels and nerve and inferior to the inguinal ligament.    In the procedure suite the patient was placed in a supine position and the skin was prepped and draped in the usual sterile fashion. A 27-gauge 1.5 inch needle was used with approximately 2 mL of 1% lidocaine for local anesthesia at the junction of the RIGHT femoral head and neck.  A 25g 3.5\" needle was placed into skin and advanced under fluoroscopic guidance into the joint space. 4 mL of contrast was used to clearly demonstrate the outlining of the joint capsule. Following negative aspiration, 2.5 mL of 0.5% ropivacaine with 2.5 mL of 0.9% normal saline with 1mL of 10mg/ml dexamethasone was then injected into the joint, and the needle was subsequently removed intact after being restyleted. The patient's hip was wiped with a 4x4 gauze, the area was cleansed with alcohol prep, and a bandaid was applied. There were no complications noted.     Follow-up as scheduled      Brooke Brown MD  Interventional Pain and Spine  Physical Medicine and Rehabilitation  [facility]      CPT  Major joint/bursa:  10887  Fluoroscopic  needle guidance [id number]  "

## 2023-04-11 NOTE — PROGRESS NOTES
1505: Rec'd pt from procedure room, pt ambulatory to chair, steady on feet, tolerating liquids. Dressing CDI.  Ice pack placed to incision site.    1509: Dr. Brown in to see patient, meets D/C criteria.    1513: Patient d/c'd to designated , placed in passenger seat.

## 2023-04-11 NOTE — INTERVAL H&P NOTE
H&P reviewed. The patient was examined and there are no changes to the H&P    Brooke Brown MD  Interventional Pain and Spine  Physical Medicine and Rehabilitation  [facility]

## 2023-04-17 NOTE — ED NOTES
Can you please put her on for a telephone visit with me some time this week. I am thinking we should have a spot where a patient has canceled.    Pt cleared for DC home education for RX provided pt to f/u with pcp to return to [facility] ED for any worsening symptom pt ambulated out of ED w/steady gait

## 2023-04-18 ENCOUNTER — TELEPHONE (OUTPATIENT)
Dept: PHYSICAL MEDICINE AND REHAB | Facility: MEDICAL CENTER | Age: 73
End: 2023-04-18

## 2023-04-18 NOTE — TELEPHONE ENCOUNTER
Called for post-sp check-up. Pt reported the following regarding the procedure site: R Hip inj    Change in pain?: MARIELLE    Concerns?: LM    Confirmed [facility] appt?: MARIELLE, 4/27

## 2023-04-27 ENCOUNTER — OFFICE VISIT (OUTPATIENT)
Dept: PHYSICAL MEDICINE AND REHAB | Facility: MEDICAL CENTER | Age: 73
End: 2023-04-27
Payer: MEDICARE

## 2023-04-27 VITALS
DIASTOLIC BLOOD PRESSURE: 68 MMHG | HEIGHT: 68 IN | BODY MASS INDEX: 24.16 KG/M2 | OXYGEN SATURATION: 98 % | WEIGHT: 159.39 LBS | SYSTOLIC BLOOD PRESSURE: 100 MMHG | TEMPERATURE: 97.6 F | HEART RATE: 50 BPM

## 2023-04-27 DIAGNOSIS — M25.551 RIGHT HIP PAIN: Primary | ICD-10-CM

## 2023-04-27 DIAGNOSIS — M16.11 OSTEOARTHRITIS OF RIGHT HIP, UNSPECIFIED OSTEOARTHRITIS TYPE: ICD-10-CM

## 2023-04-27 PROCEDURE — 99213 OFFICE O/P EST LOW 20 MIN: CPT | Performed by: STUDENT IN AN ORGANIZED HEALTH CARE EDUCATION/TRAINING PROGRAM

## 2023-04-27 ASSESSMENT — FIBROSIS 4 INDEX: FIB4 SCORE: 1.79

## 2023-04-27 ASSESSMENT — PAIN SCALES - GENERAL: PAINLEVEL: 4=SLIGHT-MODERATE PAIN

## 2023-04-27 ASSESSMENT — PATIENT HEALTH QUESTIONNAIRE - PHQ9: CLINICAL INTERPRETATION OF PHQ2 SCORE: 0

## 2023-04-27 NOTE — PROGRESS NOTES
Follow-up patient Note    Interventional Pain and Spine  Physiatry (Physical Medicine and Rehabilitation)     Patient Name: Vasile Bowers  : 1950  Date of service: 2023    Chief Complaint:   Chief Complaint   Patient presents with    Other     Myalgia       HISTORY (3/14/2023):  Vasile Bowers is a 72 y.o. male who presents today with chronic right-sided low back pain that does not radiate down the legs.  He denies numbness, tingling, or weakness in the legs.  He attributes the pain to years of skiing, snowboarding, and surfing.  He continues to remain physically active as much as he can.  He notes that nothing has helped with his pain including epidural steroid injections or Celebrex or meloxicam or physical therapy. He denies left sided low back pain.     Pain right now is 5/10 on the numeric pain scale. His pain at its best-worse level during the course of the day is typically 2-5/10, respectively.  Pain worsens with standing and walking and improves with sitting and bending forward.  He saw Dr. Heath who felt that surgical intervention was not warranted at this time with the absence of radiating pain and instead recommended right-sided L4-5 and L5-S1 facet joint ablations.  Of note he is also seeing [facility] for his right hip and has been offered a right hip replacement.  He notes hip injections have not helped in the past.      The patient has done physical therapy for this problem, most recently about 1 year ago with no relief. Consistently does stretching component of home exercise program      Patient has tried the following medications with varied success (current meds in bold):   Celebrex -no longer taking due to no relief  Meloxicam-no longer taking due to no relief  Nerve block (unknown type) -no longer taking due to no relief  Tylenol -no relief  Ibuprofen -no relief     Therapeutic modalities and interventional therapies to date include:  - epidural steroid injection - no  relief  - hip joint injection - no relief  -Of note he reports that he is anticipating receiving shoulder injections with [facility] soon     Medical history includes NSTEMI, hypertension.    HPI  Today's visit   Vasile Bowers ( 1950) is a male with The primary encounter diagnosis was Right hip pain. A diagnosis of Osteoarthritis of right hip, unspecified osteoarthritis type was also pertinent to this visit.    Presents today after  23 right hip steroid injection - 10-20% relief of pain at right posterior lateral glute.  Nonradiating, no new numbness/ting/weakness in his leg.      He notes he has been offered a right hip replacement which he would like to avoid at this time.  Denies significant relief with hip joint steroid injections in the past.  Feels that maybe he received a trigger point injection in the past as well that did not help.  He requests to have some degree of pain relief as soon as possible.    Pain severity 3/10 currently  Pt denies new numbness, tingling, or weakness.    Procedure history:  - 3/21/23  diagnostic lumbar medial branch blocks #1 targeting right L4-L5 and L5-S1 facet joints - no improvement  - 23 right hip steroid injection - 10-20% relief of pain      ROS:   Red Flags ROS:   Fever, Chills, Sweats: Denies  Involuntary Weight Loss: Denies  Bladder Incontinence: Denies  Bowel Incontinence: denies  Saddle Anesthesia: Denies    All other systems reviewed and negative.     PMHx:   Past Medical History:   Diagnosis Date    Ascending aorta dilation (HCC)     CAD (coronary artery disease)     Chronic pain     High cholesterol     Myocardial infarct (HCC)        PSHx:   Past Surgical History:   Procedure Laterality Date    DC DRAIN/INJECT LARGE JOINT/BURSA Right 2023    Procedure: Diagnostic and therapeutic Fluoroscopically guided intra-articular RIGHT hip injection;  Surgeon: Brooke Brown M.D.;  [facility];  Service: Pain  Management    INJ,ANES LUMBAR/CERVICAL Right 3/21/2023    Procedure: Diagnostic medial branch blocks targeting the RIGHT L4-5 and L5-S1 facet joints with fluoroscopic guidance #1;  Surgeon: Brooke Brown M.D.;  Location: [facility];  Service: Pain Management    IRRIGATION & DEBRIDEMENT ORTHO Left 2021    Procedure: IRRIGATION AND DEBRIDEMENT, WOUND - HAND;  Surgeon: Guillermo Villalobos M.D.;  Location: [facility];  Service: Orthopedics    [facility] CARDIAC CATH  2021    PCI to OM1     KNEE ARTHROSCOPY      SHOULDER ARTHROSCOPY         Family Hx:   Family History   Problem Relation Age of Onset    Heart Disease Mother     Heart Failure Mother        Social Hx:  Social History     Socioeconomic History    Marital status:      Spouse name: Not on file    Number of children: Not on file    Years of education: Not on file    Highest education level: Not on file   Occupational History    Not on file   Tobacco Use    Smoking status: Former     Types: Cigarettes     Quit date:      Years since quittin.3    Smokeless tobacco: Former     Types: Chew     Quit date:    Vaping Use    Vaping Use: Never used   Substance and Sexual Activity    Alcohol use: Not Currently    Drug use: No    Sexual activity: Not on file   Other Topics Concern    Not on file   Social History Narrative    Not on file     Social Determinants of Health     Financial Resource Strain: Not on file   Food Insecurity: Not on file   Transportation Needs: Not on file   Physical Activity: Not on file   Stress: Not on file   Social Connections: Not on file   Intimate Partner Violence: Not on file   Housing Stability: Not on file       Allergies:  No Known Allergies    Medications: reviewed on epic.   Outpatient Medications Marked as Taking for the 23 encounter (Office Visit) with Brooke Brown M.D.   Medication Sig Dispense Refill    VITAMIN K PO Take 1 Tablet by mouth every day.       Glucosamine-Chondroit-Vit C-Mn (GLUCOSAMINE 1500 COMPLEX PO) Take 1 Tablet by mouth every day.      Turmeric 500 MG Cap Take 1 Capsule by mouth every day.      meloxicam (MOBIC) 15 MG tablet Take 1 Tablet by mouth every day. 30 Tablet 0    prasugrel (EFFIENT) 10 MG Tab TAKE ONE TABLET BY MOUTH EVERY DAY 90 Tablet 2    atorvastatin (LIPITOR) 80 MG tablet TAKE ONE TABLET BY MOUTH EVERY EVENING 90 Tablet 2    Cholecalciferol (VITAMIN D3) 177739 UNIT/GM Powder Take 1 Capsule by mouth every day.      aspirin EC 81 MG EC tablet Take 1 tablet by mouth every day. (Patient taking differently: Take 81 mg by mouth every evening.) 30 tablet 2    Ascorbic Acid (VITAMIN C PO) Take 1 tablet by mouth every day.      Cyanocobalamin (B-12 PO) Take 1 tablet by mouth every day.          Current Outpatient Medications on File Prior to Visit   Medication Sig Dispense Refill    VITAMIN K PO Take 1 Tablet by mouth every day.      Glucosamine-Chondroit-Vit C-Mn (GLUCOSAMINE 1500 COMPLEX PO) Take 1 Tablet by mouth every day.      Turmeric 500 MG Cap Take 1 Capsule by mouth every day.      meloxicam (MOBIC) 15 MG tablet Take 1 Tablet by mouth every day. 30 Tablet 0    prasugrel (EFFIENT) 10 MG Tab TAKE ONE TABLET BY MOUTH EVERY DAY 90 Tablet 2    atorvastatin (LIPITOR) 80 MG tablet TAKE ONE TABLET BY MOUTH EVERY EVENING 90 Tablet 2    Cholecalciferol (VITAMIN D3) 658059 UNIT/GM Powder Take 1 Capsule by mouth every day.      aspirin EC 81 MG EC tablet Take 1 tablet by mouth every day. (Patient taking differently: Take 81 mg by mouth every evening.) 30 tablet 2    Ascorbic Acid (VITAMIN C PO) Take 1 tablet by mouth every day.      Cyanocobalamin (B-12 PO) Take 1 tablet by mouth every day.      celecoxib (CELEBREX) 200 MG Cap Take 1 Capsule by mouth 2 times daily with meals as needed for Moderate Pain or Inflammation. (Patient not taking: Reported on 4/27/2023) 90 Capsule 5    meloxicam (MOBIC) 15 MG tablet Take 1 Tablet by mouth every day.  "(Patient not taking: Reported on 4/27/2023) 30 Tablet 0    Cholecalciferol (D3 PO) Take 1 capsule by mouth every day. (Patient not taking: Reported on 4/27/2023)       No current facility-administered medications on file prior to visit.         EXAMINATION     Physical Exam:   /68   Pulse (!) 50   Temp 36.4 °C (97.6 °F)   Ht 1.727 m (5' 8\")   Wt 72.3 kg (159 lb 6.3 oz)   SpO2 98%     Constitutional:   Body Habitus: Body mass index is 24.24 kg/m².  Cooperation: Fully cooperates with exam  Appearance: Well-groomed, well-nourished.    Eyes: No scleral icterus to suggest severe liver disease, no proptosis to suggest severe hyperthyroidism    ENT -no obvious auditory deficits, no noticeable facial droop     Skin -no rashes or lesions noted     Respiratory-  breathing comfortably on room air, no audible wheezing    Cardiovascular-distal extremities warm and well perfused.  No lower extremity edema is noted.     Gastrointestinal - no obvious abdominal masses, non-distended    Psychiatric- alert and oriented ×3. Normal affect.     Gait - normal gait, no use of ambulatory device, nonantalgic. Heel walking and toe walking intact.     Musculoskeletal and Neuro -      Thoracic/Lumbar Spine/Sacral Spine/Hips     Gait - normal gait, no use of ambulatory device, nonantalgic.      Musculoskeletal and Neuro -      Thoracic/Lumbar Spine/Sacral Spine/Hips   Inspection: No evidence of atrophy in bilateral lower extremities throughout       Palpation:   Focal tenderness to palpation over the right posterolateral glute.  No tenderness to palpation over lumbar facets on right spanning approximately L1-L5 levels. No tenderness to palpation elsewhere in the low back/hips including midline of lumbosacral spine, paraspinal muscles bilaterally, lumbar facets on left spanning approximately L1-L5 levels, sacroiliac joints bilaterally, PSIS bilaterally, and greater trochanters bilaterally.     Lumbar spine /hip provocative exam " maneuvers  Straight leg raise negative bilaterally  FADIR test negative bilaterally     SI joint tests  EDWAR test negative bilaterally  Thigh thrust test negative bilaterally     Key points for the international standards for neurological classification of spinal cord injury (ISNCSCI) to light touch.   Dermatome R L   L2 2 2   L3 2 2   L4 2 2   L5 2 2   S1 2 2   S2 2 2         Motor Exam Lower Extremities  ? Myotome R L   Hip flexion L2 5 5   Knee extension L3 5 5   Ankle dorsiflexion L4 5 5   Toe extension L5 5 5   Ankle plantarflexion S1 5 5      Previous exam  There is full active range of motion with lumbar extension     Facet loading maneuver negative bilaterally    Reflexes  ?   R L   Patella   2+ 2+   Achilles    2+ 2+      Clonus of the ankle negative bilaterally            MEDICAL DECISION MAKING    Medical records review: see under HPI section.     DATA    Labs: No new labs available for review since last visit.   Lab Results   Component Value Date/Time    SODIUM 142 05/07/2022 10:10 AM    POTASSIUM 4.4 05/07/2022 10:10 AM    CHLORIDE 107 05/07/2022 10:10 AM    CO2 25 05/07/2022 10:10 AM    ANION 10.0 05/07/2022 10:10 AM    GLUCOSE 89 05/07/2022 10:10 AM    BUN 18 05/07/2022 10:10 AM    CREATININE 1.06 05/07/2022 10:10 AM    CALCIUM 9.0 05/07/2022 10:10 AM    ASTSGOT 18 12/09/2021 04:56 AM    ALTSGPT 21 12/09/2021 04:56 AM    TBILIRUBIN 0.3 12/09/2021 04:56 AM    ALBUMIN 3.8 12/09/2021 04:56 AM    TOTPROTEIN 6.0 12/09/2021 04:56 AM    GLOBULIN 2.2 12/09/2021 04:56 AM    AGRATIO 1.7 12/09/2021 04:56 AM       Lab Results   Component Value Date/Time    PROTHROMBTM 13.2 07/26/2021 02:59 AM    INR 1.03 07/26/2021 02:59 AM        Lab Results   Component Value Date/Time    WBC 8.7 12/09/2021 04:56 AM    RBC 4.20 (L) 12/09/2021 04:56 AM    HEMOGLOBIN 13.9 (L) 12/09/2021 04:56 AM    HEMATOCRIT 40.9 (L) 12/09/2021 04:56 AM    MCV 97.4 12/09/2021 04:56 AM    MCH 33.1 (H) 12/09/2021 04:56 AM    MCHC 34.0 12/09/2021  04:56 AM    MPV 9.7 2021 04:56 AM    NEUTSPOLYS 84.90 (H) 2021 04:56 AM    LYMPHOCYTES 8.10 (L) 2021 04:56 AM    MONOCYTES 6.60 2021 04:56 AM    EOSINOPHILS 0.00 2021 04:56 AM    BASOPHILS 0.10 2021 04:56 AM        No results found for: HBA1C     Imaging:   I personally reviewed following images, these are my reads  MRI lumbar spine 2022  Spondylolisthesis at L5-S1.  Disc space narrowing at L4-5 and L5-S1.  Disc bulge most notable at L4-5 and L5-S1.  Subacute compression fracture at superior endplate of L1.  Severe left-sided neuroforaminal stenosis at L5-S1, moderate right-sided neuroforaminal stenosis at L5-S1. Severe right-sided neuroforaminal stenosis at L4-5.  Facet arthropathy most notable at bilateral L4-5 and L5-S1. See formal radiology report for further details.     Unable to view images from XR lumbar spine 22 at the time of today's visit     IMAGING radiology reads. I reviewed the following radiology reads   MRI lumbar spine 2022         XR lumbar spine 22  AP lateral and flexion-extension views of the lumbar spine reveal severe   degenerative changes at the lumbar spine as well as what appears to be   chronic scoliosis/curvature.  These chronic findings make it difficult to   ascertain if there is an acute, nondisplaced fracture.         XR right hip 23  Multiple views of the hip including anterior-posterior and lateral views   reveal no evidence of an obvious displaced fracture or dislocation.  We   see significant cam deformity of the femoral head.  Also joint space   narrowing, sclerosis, and osteophyte formation significant for moderate to   severe osteoarthritis of the hip.      Diagnosis  Visit Diagnoses     ICD-10-CM   1. Right hip pain  M25.551   2. Osteoarthritis of right hip, unspecified osteoarthritis type  M16.11           ASSESSMENT AND PLAN:  Vasile Bowers (: 1950) is a male with  history of chronic low back  "pain who presents with worst pain at focal posterior lateral glute that corresponds to likely myalgia in etiology, but also possibly right hip joint osteoarthritis.  He has been offered a right hip replacement and would like to defer this if possible.     Vasile was seen today for other.    Diagnoses and all orders for this visit:    Right hip pain  -     Referral to Pain Clinic    Osteoarthritis of right hip, unspecified osteoarthritis type  -     Referral to Pain Clinic            PLAN  Physical Therapy: Patient has done extensive physical therapy for this issue in the past and has been diligent with his home exercise program since.  Discussed that he should continue his home exercise program as able     Home Exercise Program: I previously provided the patient with a home exercise program focusing on strengthening and stretching.      Diagnostic workup: no new imaging needed at this time     Medications:   -The patient has tried several NSAIDs including Celebrex and Mobic, Tylenol, \"nerve blocker\" with no relief     Interventions:   -Diagnostic right femoral and obturator nerve blocks under fluoroscopic guidance. The risks, benefits, and alternatives to this procedure were discussed and the patient wishes to proceed with the procedure. Risks include but are not limited to damage to surrounding structures, infection, bleeding, worsening of pain which can be permanent, and weakness which can be permanent. Benefits include pain relief and improved function. Alternatives include not doing the procedure.    -S/p diagnostic lumbar medial branch blocks targeting right L4-5 and L5-S1 facet joints with no significant improvement.  -Right hip intra-articular steroid injection under fluoroscopic guidance with 10-20% improvement in pain  -The patient reports he has received epidurals in the past with no relief at [facility] and with Dr. Live.       Follow-up: 3 days after injection above    Orders Placed This Encounter    " Referral to Pain Clinic       Brooke Brown MD  Interventional Pain and Spine  Physical Medicine and Rehabilitation  [facility]      The above note documents my personal evaluation of this patient. In addition, I have reviewed and confirmed with the patient and MA the supportive information documented in today's Patient Health Questionnaire and Office Note.     Please note that this dictation was created using voice recognition software. I have made every reasonable attempt to correct obvious errors, but I expect that there are errors of grammar and possibly content that I did not discover before finalizing the note.

## 2023-04-27 NOTE — H&P (VIEW-ONLY)
Follow-up patient Note    Interventional Pain and Spine  Physiatry (Physical Medicine and Rehabilitation)     Patient Name: Vasile Bowers  : 1950  Date of service: 2023    Chief Complaint:   Chief Complaint   Patient presents with    Other     Myalgia       HISTORY (3/14/2023):  Vasile Bowers is a 72 y.o. male who presents today with chronic right-sided low back pain that does not radiate down the legs.  He denies numbness, tingling, or weakness in the legs.  He attributes the pain to years of skiing, snowboarding, and surfing.  He continues to remain physically active as much as he can.  He notes that nothing has helped with his pain including epidural steroid injections or Celebrex or meloxicam or physical therapy. He denies left sided low back pain.     Pain right now is 5/10 on the numeric pain scale. His pain at its best-worse level during the course of the day is typically 2-5/10, respectively.  Pain worsens with standing and walking and improves with sitting and bending forward.  He saw Dr. Heath who felt that surgical intervention was not warranted at this time with the absence of radiating pain and instead recommended right-sided L4-5 and L5-S1 facet joint ablations.  Of note he is also seeing [facility] for his right hip and has been offered a right hip replacement.  He notes hip injections have not helped in the past.      The patient has done physical therapy for this problem, most recently about 1 year ago with no relief. Consistently does stretching component of home exercise program      Patient has tried the following medications with varied success (current meds in bold):   Celebrex -no longer taking due to no relief  Meloxicam-no longer taking due to no relief  Nerve block (unknown type) -no longer taking due to no relief  Tylenol -no relief  Ibuprofen -no relief     Therapeutic modalities and interventional therapies to date include:  - epidural steroid injection - no  relief  - hip joint injection - no relief  -Of note he reports that he is anticipating receiving shoulder injections with [facility] soon     Medical history includes NSTEMI, hypertension.    HPI  Today's visit   Vasile Bowers ( 1950) is a male with The primary encounter diagnosis was Right hip pain. A diagnosis of Osteoarthritis of right hip, unspecified osteoarthritis type was also pertinent to this visit.    Presents today after  23 right hip steroid injection - 10-20% relief of pain at right posterior lateral glute.  Nonradiating, no new numbness/ting/weakness in his leg.      He notes he has been offered a right hip replacement which he would like to avoid at this time.  Denies significant relief with hip joint steroid injections in the past.  Feels that maybe he received a trigger point injection in the past as well that did not help.  He requests to have some degree of pain relief as soon as possible.    Pain severity 3/10 currently  Pt denies new numbness, tingling, or weakness.    Procedure history:  - 3/21/23  diagnostic lumbar medial branch blocks #1 targeting right L4-L5 and L5-S1 facet joints - no improvement  - 23 right hip steroid injection - 10-20% relief of pain      ROS:   Red Flags ROS:   Fever, Chills, Sweats: Denies  Involuntary Weight Loss: Denies  Bladder Incontinence: Denies  Bowel Incontinence: denies  Saddle Anesthesia: Denies    All other systems reviewed and negative.     PMHx:   Past Medical History:   Diagnosis Date    Ascending aorta dilation (HCC)     CAD (coronary artery disease)     Chronic pain     High cholesterol     Myocardial infarct (HCC)        PSHx:   Past Surgical History:   Procedure Laterality Date    PA DRAIN/INJECT LARGE JOINT/BURSA Right 2023    Procedure: Diagnostic and therapeutic Fluoroscopically guided intra-articular RIGHT hip injection;  Surgeon: Brooke Brown M.D.;  [facility];  Service: Pain  Management    INJ,ANES LUMBAR/CERVICAL Right 3/21/2023    Procedure: Diagnostic medial branch blocks targeting the RIGHT L4-5 and L5-S1 facet joints with fluoroscopic guidance #1;  Surgeon: Brooke Brown M.D.;  Location: [facility];  Service: Pain Management    IRRIGATION & DEBRIDEMENT ORTHO Left 2021    Procedure: IRRIGATION AND DEBRIDEMENT, WOUND - HAND;  Surgeon: Guillermo Villalobos M.D.;  Location: SURGERY [facility];  Service: Orthopedics    [facility] CARDIAC CATH  2021    PCI to OM1     KNEE ARTHROSCOPY      SHOULDER ARTHROSCOPY         Family Hx:   Family History   Problem Relation Age of Onset    Heart Disease Mother     Heart Failure Mother        Social Hx:  Social History     Socioeconomic History    Marital status:      Spouse name: Not on file    Number of children: Not on file    Years of education: Not on file    Highest education level: Not on file   Occupational History    Not on file   Tobacco Use    Smoking status: Former     Types: Cigarettes     Quit date:      Years since quittin.3    Smokeless tobacco: Former     Types: Chew     Quit date:    Vaping Use    Vaping Use: Never used   Substance and Sexual Activity    Alcohol use: Not Currently    Drug use: No    Sexual activity: Not on file   Other Topics Concern    Not on file   Social History Narrative    Not on file     Social Determinants of Health     Financial Resource Strain: Not on file   Food Insecurity: Not on file   Transportation Needs: Not on file   Physical Activity: Not on file   Stress: Not on file   Social Connections: Not on file   Intimate Partner Violence: Not on file   Housing Stability: Not on file       Allergies:  No Known Allergies    Medications: reviewed on epic.   Outpatient Medications Marked as Taking for the 23 encounter (Office Visit) with Brooke Brown M.D.   Medication Sig Dispense Refill    VITAMIN K PO Take 1 Tablet by mouth every day.       Glucosamine-Chondroit-Vit C-Mn (GLUCOSAMINE 1500 COMPLEX PO) Take 1 Tablet by mouth every day.      Turmeric 500 MG Cap Take 1 Capsule by mouth every day.      meloxicam (MOBIC) 15 MG tablet Take 1 Tablet by mouth every day. 30 Tablet 0    prasugrel (EFFIENT) 10 MG Tab TAKE ONE TABLET BY MOUTH EVERY DAY 90 Tablet 2    atorvastatin (LIPITOR) 80 MG tablet TAKE ONE TABLET BY MOUTH EVERY EVENING 90 Tablet 2    Cholecalciferol (VITAMIN D3) 536487 UNIT/GM Powder Take 1 Capsule by mouth every day.      aspirin EC 81 MG EC tablet Take 1 tablet by mouth every day. (Patient taking differently: Take 81 mg by mouth every evening.) 30 tablet 2    Ascorbic Acid (VITAMIN C PO) Take 1 tablet by mouth every day.      Cyanocobalamin (B-12 PO) Take 1 tablet by mouth every day.          Current Outpatient Medications on File Prior to Visit   Medication Sig Dispense Refill    VITAMIN K PO Take 1 Tablet by mouth every day.      Glucosamine-Chondroit-Vit C-Mn (GLUCOSAMINE 1500 COMPLEX PO) Take 1 Tablet by mouth every day.      Turmeric 500 MG Cap Take 1 Capsule by mouth every day.      meloxicam (MOBIC) 15 MG tablet Take 1 Tablet by mouth every day. 30 Tablet 0    prasugrel (EFFIENT) 10 MG Tab TAKE ONE TABLET BY MOUTH EVERY DAY 90 Tablet 2    atorvastatin (LIPITOR) 80 MG tablet TAKE ONE TABLET BY MOUTH EVERY EVENING 90 Tablet 2    Cholecalciferol (VITAMIN D3) 759270 UNIT/GM Powder Take 1 Capsule by mouth every day.      aspirin EC 81 MG EC tablet Take 1 tablet by mouth every day. (Patient taking differently: Take 81 mg by mouth every evening.) 30 tablet 2    Ascorbic Acid (VITAMIN C PO) Take 1 tablet by mouth every day.      Cyanocobalamin (B-12 PO) Take 1 tablet by mouth every day.      celecoxib (CELEBREX) 200 MG Cap Take 1 Capsule by mouth 2 times daily with meals as needed for Moderate Pain or Inflammation. (Patient not taking: Reported on 4/27/2023) 90 Capsule 5    meloxicam (MOBIC) 15 MG tablet Take 1 Tablet by mouth every day.  "(Patient not taking: Reported on 4/27/2023) 30 Tablet 0    Cholecalciferol (D3 PO) Take 1 capsule by mouth every day. (Patient not taking: Reported on 4/27/2023)       No current facility-administered medications on file prior to visit.         EXAMINATION     Physical Exam:   /68   Pulse (!) 50   Temp 36.4 °C (97.6 °F)   Ht 1.727 m (5' 8\")   Wt 72.3 kg (159 lb 6.3 oz)   SpO2 98%     Constitutional:   Body Habitus: Body mass index is 24.24 kg/m².  Cooperation: Fully cooperates with exam  Appearance: Well-groomed, well-nourished.    Eyes: No scleral icterus to suggest severe liver disease, no proptosis to suggest severe hyperthyroidism    ENT -no obvious auditory deficits, no noticeable facial droop     Skin -no rashes or lesions noted     Respiratory-  breathing comfortably on room air, no audible wheezing    Cardiovascular-distal extremities warm and well perfused.  No lower extremity edema is noted.     Gastrointestinal - no obvious abdominal masses, non-distended    Psychiatric- alert and oriented ×3. Normal affect.     Gait - normal gait, no use of ambulatory device, nonantalgic. Heel walking and toe walking intact.     Musculoskeletal and Neuro -      Thoracic/Lumbar Spine/Sacral Spine/Hips     Gait - normal gait, no use of ambulatory device, nonantalgic.      Musculoskeletal and Neuro -      Thoracic/Lumbar Spine/Sacral Spine/Hips   Inspection: No evidence of atrophy in bilateral lower extremities throughout       Palpation:   Focal tenderness to palpation over the right posterolateral glute.  No tenderness to palpation over lumbar facets on right spanning approximately L1-L5 levels. No tenderness to palpation elsewhere in the low back/hips including midline of lumbosacral spine, paraspinal muscles bilaterally, lumbar facets on left spanning approximately L1-L5 levels, sacroiliac joints bilaterally, PSIS bilaterally, and greater trochanters bilaterally.     Lumbar spine /hip provocative exam " maneuvers  Straight leg raise negative bilaterally  FADIR test negative bilaterally     SI joint tests  EDWAR test negative bilaterally  Thigh thrust test negative bilaterally     Key points for the international standards for neurological classification of spinal cord injury (ISNCSCI) to light touch.   Dermatome R L   L2 2 2   L3 2 2   L4 2 2   L5 2 2   S1 2 2   S2 2 2         Motor Exam Lower Extremities  ? Myotome R L   Hip flexion L2 5 5   Knee extension L3 5 5   Ankle dorsiflexion L4 5 5   Toe extension L5 5 5   Ankle plantarflexion S1 5 5      Previous exam  There is full active range of motion with lumbar extension     Facet loading maneuver negative bilaterally    Reflexes  ?   R L   Patella   2+ 2+   Achilles    2+ 2+      Clonus of the ankle negative bilaterally            MEDICAL DECISION MAKING    Medical records review: see under HPI section.     DATA    Labs: No new labs available for review since last visit.   Lab Results   Component Value Date/Time    SODIUM 142 05/07/2022 10:10 AM    POTASSIUM 4.4 05/07/2022 10:10 AM    CHLORIDE 107 05/07/2022 10:10 AM    CO2 25 05/07/2022 10:10 AM    ANION 10.0 05/07/2022 10:10 AM    GLUCOSE 89 05/07/2022 10:10 AM    BUN 18 05/07/2022 10:10 AM    CREATININE 1.06 05/07/2022 10:10 AM    CALCIUM 9.0 05/07/2022 10:10 AM    ASTSGOT 18 12/09/2021 04:56 AM    ALTSGPT 21 12/09/2021 04:56 AM    TBILIRUBIN 0.3 12/09/2021 04:56 AM    ALBUMIN 3.8 12/09/2021 04:56 AM    TOTPROTEIN 6.0 12/09/2021 04:56 AM    GLOBULIN 2.2 12/09/2021 04:56 AM    AGRATIO 1.7 12/09/2021 04:56 AM       Lab Results   Component Value Date/Time    PROTHROMBTM 13.2 07/26/2021 02:59 AM    INR 1.03 07/26/2021 02:59 AM        Lab Results   Component Value Date/Time    WBC 8.7 12/09/2021 04:56 AM    RBC 4.20 (L) 12/09/2021 04:56 AM    HEMOGLOBIN 13.9 (L) 12/09/2021 04:56 AM    HEMATOCRIT 40.9 (L) 12/09/2021 04:56 AM    MCV 97.4 12/09/2021 04:56 AM    MCH 33.1 (H) 12/09/2021 04:56 AM    MCHC 34.0 12/09/2021  04:56 AM    MPV 9.7 2021 04:56 AM    NEUTSPOLYS 84.90 (H) 2021 04:56 AM    LYMPHOCYTES 8.10 (L) 2021 04:56 AM    MONOCYTES 6.60 2021 04:56 AM    EOSINOPHILS 0.00 2021 04:56 AM    BASOPHILS 0.10 2021 04:56 AM        No results found for: HBA1C     Imaging:   I personally reviewed following images, these are my reads  MRI lumbar spine 2022  Spondylolisthesis at L5-S1.  Disc space narrowing at L4-5 and L5-S1.  Disc bulge most notable at L4-5 and L5-S1.  Subacute compression fracture at superior endplate of L1.  Severe left-sided neuroforaminal stenosis at L5-S1, moderate right-sided neuroforaminal stenosis at L5-S1. Severe right-sided neuroforaminal stenosis at L4-5.  Facet arthropathy most notable at bilateral L4-5 and L5-S1. See formal radiology report for further details.     Unable to view images from XR lumbar spine 22 at the time of today's visit     IMAGING radiology reads. I reviewed the following radiology reads   MRI lumbar spine 2022         XR lumbar spine 22  AP lateral and flexion-extension views of the lumbar spine reveal severe   degenerative changes at the lumbar spine as well as what appears to be   chronic scoliosis/curvature.  These chronic findings make it difficult to   ascertain if there is an acute, nondisplaced fracture.         XR right hip 23  Multiple views of the hip including anterior-posterior and lateral views   reveal no evidence of an obvious displaced fracture or dislocation.  We   see significant cam deformity of the femoral head.  Also joint space   narrowing, sclerosis, and osteophyte formation significant for moderate to   severe osteoarthritis of the hip.      Diagnosis  Visit Diagnoses     ICD-10-CM   1. Right hip pain  M25.551   2. Osteoarthritis of right hip, unspecified osteoarthritis type  M16.11           ASSESSMENT AND PLAN:  Vasile Bowers (: 1950) is a male with  history of chronic low back  "pain who presents with worst pain at focal posterior lateral glute that corresponds to likely myalgia in etiology, but also possibly right hip joint osteoarthritis.  He has been offered a right hip replacement and would like to defer this if possible.     Vasile was seen today for other.    Diagnoses and all orders for this visit:    Right hip pain  -     Referral to Pain Clinic    Osteoarthritis of right hip, unspecified osteoarthritis type  -     Referral to Pain Clinic            PLAN  Physical Therapy: Patient has done extensive physical therapy for this issue in the past and has been diligent with his home exercise program since.  Discussed that he should continue his home exercise program as able     Home Exercise Program: I previously provided the patient with a home exercise program focusing on strengthening and stretching.      Diagnostic workup: no new imaging needed at this time     Medications:   -The patient has tried several NSAIDs including Celebrex and Mobic, Tylenol, \"nerve blocker\" with no relief     Interventions:   -Diagnostic right femoral and obturator nerve blocks under fluoroscopic guidance. The risks, benefits, and alternatives to this procedure were discussed and the patient wishes to proceed with the procedure. Risks include but are not limited to damage to surrounding structures, infection, bleeding, worsening of pain which can be permanent, and weakness which can be permanent. Benefits include pain relief and improved function. Alternatives include not doing the procedure.    -S/p diagnostic lumbar medial branch blocks targeting right L4-5 and L5-S1 facet joints with no significant improvement.  -Right hip intra-articular steroid injection under fluoroscopic guidance with 10-20% improvement in pain  -The patient reports he has received epidurals in the past with no relief at [place] and with Dr. Live.       Follow-up: 3 days after injection above    Orders Placed This Encounter    " Referral to Pain Clinic       Brooke Brown MD  Interventional Pain and Spine  Physical Medicine and Rehabilitation  [facility]      The above note documents my personal evaluation of this patient. In addition, I have reviewed and confirmed with the patient and MA the supportive information documented in today's Patient Health Questionnaire and Office Note.     Please note that this dictation was created using voice recognition software. I have made every reasonable attempt to correct obvious errors, but I expect that there are errors of grammar and possibly content that I did not discover before finalizing the note.

## 2023-05-02 ENCOUNTER — APPOINTMENT (OUTPATIENT)
Dept: RADIOLOGY | Facility: REHABILITATION | Age: 73
End: 2023-05-02
Attending: STUDENT IN AN ORGANIZED HEALTH CARE EDUCATION/TRAINING PROGRAM
Payer: MEDICARE

## 2023-05-02 ENCOUNTER — HOSPITAL ENCOUNTER (OUTPATIENT)
Facility: REHABILITATION | Age: 73
End: 2023-05-02
Attending: STUDENT IN AN ORGANIZED HEALTH CARE EDUCATION/TRAINING PROGRAM | Admitting: STUDENT IN AN ORGANIZED HEALTH CARE EDUCATION/TRAINING PROGRAM
Payer: MEDICARE

## 2023-05-02 VITALS
BODY MASS INDEX: 24.32 KG/M2 | HEART RATE: 50 BPM | RESPIRATION RATE: 16 BRPM | SYSTOLIC BLOOD PRESSURE: 119 MMHG | WEIGHT: 160.5 LBS | DIASTOLIC BLOOD PRESSURE: 70 MMHG | TEMPERATURE: 98.1 F | HEIGHT: 68 IN | OXYGEN SATURATION: 97 %

## 2023-05-02 PROCEDURE — 77002 NEEDLE LOCALIZATION BY XRAY: CPT

## 2023-05-02 PROCEDURE — 700111 HCHG RX REV CODE 636 W/ 250 OVERRIDE (IP)

## 2023-05-02 PROCEDURE — 700101 HCHG RX REV CODE 250

## 2023-05-02 PROCEDURE — 64447 NJX AA&/STRD FEMORAL NRV IMG: CPT

## 2023-05-02 PROCEDURE — 64450 NJX AA&/STRD OTHER PN/BRANCH: CPT

## 2023-05-02 RX ORDER — LIDOCAINE HYDROCHLORIDE 10 MG/ML
INJECTION, SOLUTION EPIDURAL; INFILTRATION; INTRACAUDAL; PERINEURAL
Status: COMPLETED
Start: 2023-05-02 | End: 2023-05-02

## 2023-05-02 RX ORDER — BUPIVACAINE HYDROCHLORIDE 5 MG/ML
INJECTION, SOLUTION EPIDURAL; INTRACAUDAL
Status: COMPLETED
Start: 2023-05-02 | End: 2023-05-02

## 2023-05-02 RX ADMIN — LIDOCAINE HYDROCHLORIDE 10 ML: 10 INJECTION, SOLUTION EPIDURAL; INFILTRATION; INTRACAUDAL; PERINEURAL at 15:28

## 2023-05-02 RX ADMIN — BUPIVACAINE HYDROCHLORIDE 5 ML: 5 INJECTION, SOLUTION EPIDURAL; INTRACAUDAL; PERINEURAL at 15:29

## 2023-05-02 ASSESSMENT — FIBROSIS 4 INDEX: FIB4 SCORE: 1.79

## 2023-05-02 ASSESSMENT — PAIN DESCRIPTION - PAIN TYPE: TYPE: CHRONIC PAIN

## 2023-05-02 NOTE — OP REPORT
Patient Name: Vasile Bowers 72 y.o. male    MRN: [id number]     Date of Service: 5/2/2023    Physician/s: Brooke Brown MD    Pre-operative Diagnosis: right hip osteoarthrosis, right hip pain  Post-operative Diagnosis: right hip osteoarthrosis, right hip pain    Procedure: Diagnostic right hip obturator and femoral nerve Blocks     Description of procedure:  The risks, benefits, and alternatives of the procedure were reviewed and discussed with the patient.  Written informed consent was freely obtained. A pre-procedural time-out was conducted by the physician verifying patient’s identity, procedure to be performed, procedure site and side, and allergy verification. Appropriate equipment was determined to be in place for the procedure.     The patient's vital signs were carefully monitored before, throughout, and after the procedure.    In the fluoroscopy suite the patient was placed in a supine position. Ultrasound was used to visualize the femoral vessels, inguinal ligament, and lower abdomen and these areas were marked. Then the hip was then prepped in the usual sterile fashion.    The fluoroscope was placed over the right hip. A 25-gauge 1.5 inch needle was used for local anesthesia and following negative aspiration I injected 1 mL of 1% lidocaine into the dermal and epidermal layers.      Using fluoroscopic guidance a 22g 7 inch needle was placed and was advanced to the incisura of the acetabulum where the articular branch of the obturator nerve traverses until a bony endpoint was met. Attempted aspiration yielded no blood.  0.5mL of 0.5% bupivacaine was then injected.     Then a 22g 7 inch needle was advanced to the anteromedial aspect of the extraarticular portion of the hip joint where the articular branch of the femoral nerve traverses until a bony endpoint was felt. Attempted aspiration yielded no blood. 0.5mL of 0.5% bupivacaine was then injected.     Final radiographic images were saved for permanent  storage    The patient's hip was covered with a 4x4 gauze, the area was cleansed with sterile normal saline, and a dressing was applied.     There were no complications noted, was hemodynamically stable, and tolerated the procedure well.     Follow-up as scheduled    Brooke Brown MD  Interventional Pain and Spine  Physical Medicine and Rehabilitation  [facility]         CPT  Fluoroscopic  needle guidance 27318   Obturator nerve block 36258   Femoral nerve block 01576

## 2023-05-02 NOTE — PROGRESS NOTES
1346: Rec'd pt from procedure room, pt ambulatory to chair, steady on feet, tolerating liquids. Dressing CDI.  Ice pack placed to incision site.    1549 Dr. Brown in to see patient, meets D/C criteria.    1602 Patient d/c'd to designated , placed in passenger seat.

## 2023-05-02 NOTE — INTERVAL H&P NOTE
H&P reviewed. The patient was examined and there are no changes to the H&P    Brooke Brown MD  Interventional Pain and Spine  Physical Medicine and Rehabilitation  [facility]

## 2023-05-02 NOTE — PROGRESS NOTES
1440: : Dr. Brown into see patient, questions answered, d/c instructions given with understanding.

## 2023-05-03 ENCOUNTER — TELEPHONE (OUTPATIENT)
Dept: PHYSICAL MEDICINE AND REHAB | Facility: MEDICAL CENTER | Age: 73
End: 2023-05-03
Payer: MEDICARE

## 2023-05-03 NOTE — TELEPHONE ENCOUNTER
Called for post-sp check-up. Pt reported the following regarding the procedure site: Diagnostic RIGHT femoral and obturator nerve blocks with fluoroscopic guidance    Change in pain?: LM    Concerns?: LM    Confirmed [facility] appt?: LM, 5/5

## 2023-05-04 NOTE — PROGRESS NOTES
Follow-up patient Note    Interventional Pain and Spine  Physiatry (Physical Medicine and Rehabilitation)     Patient Name: Vasile Bowers  : 1950  Date of service: 2023    Chief Complaint:   Chief Complaint   Patient presents with    Follow-Up     Right hip pain       HISTORY (3/14/2023):  Vasile Bowers is a 72 y.o. male who presents today with chronic right-sided low back pain that does not radiate down the legs.  He denies numbness, tingling, or weakness in the legs.  He attributes the pain to years of skiing, snowboarding, and surfing.  He continues to remain physically active as much as he can.  He notes that nothing has helped with his pain including epidural steroid injections or Celebrex or meloxicam or physical therapy. He denies left sided low back pain.     Pain right now is 5/10 on the numeric pain scale. His pain at its best-worse level during the course of the day is typically 2-5/10, respectively.  Pain worsens with standing and walking and improves with sitting and bending forward.  He saw Dr. Heath who felt that surgical intervention was not warranted at this time with the absence of radiating pain and instead recommended right-sided L4-5 and L5-S1 facet joint ablations.  Of note he is also seeing Adam for his right hip and has been offered a right hip replacement.  He notes hip injections have not helped in the past.      The patient has done physical therapy for this problem, most recently about 1 year ago with no relief. Consistently does stretching component of home exercise program      Patient has tried the following medications with varied success (current meds in bold):   Celebrex -no longer taking due to no relief  Meloxicam-no longer taking due to no relief  Nerve block (unknown type) -no longer taking due to no relief  Tylenol -no relief  Ibuprofen -no relief     Therapeutic modalities and interventional therapies to date include:  - epidural steroid  injection - no relief  - hip joint injection - no relief  -Of note he reports that he is anticipating receiving shoulder injections with [facility] soon     Medical history includes NSTEMI, hypertension.    HPI  Today's visit   Vasile Bowers ( 1950) is a male with The primary encounter diagnosis was Right hip pain. A diagnosis of Osteoarthritis of right hip, unspecified osteoarthritis type was also pertinent to this visit.    Presents today after 23 Diagnostic right hip obturator and femoral nerve Blocks with 50% improvement in pain for anticipated duration of local anesthetic and improved ability to do ADLs.  Notes that the next day the pain returned as before.  Now interfering with ability to do ADLs including walking his dog.  He wants to proceed with radiofrequency ablation if possible.     He notes he has been offered a right hip replacement which he would like to avoid at this time.  Denies significant relief with hip joint steroid injections in the past.  Feels that maybe he received a trigger point injection in the past as well that did not help.  He requests to have some degree of pain relief as soon as possible.    Pain severity 2/10 currently at rest  Pt denies new numbness, tingling, or weakness.    Procedure history:  - 3/21/23  diagnostic lumbar medial branch blocks #1 targeting right L4-L5 and L5-S1 facet joints - no improvement  - 23 right hip steroid injection - 10-20% relief of pain  - 23 Diagnostic right hip obturator and femoral nerve Blocks - 50% improvement in pain, improvement in ability to perform ADLs      ROS:   Red Flags ROS:   Fever, Chills, Sweats: Denies  Involuntary Weight Loss: Denies  Bladder Incontinence: Denies  Bowel Incontinence: denies  Saddle Anesthesia: Denies    All other systems reviewed and negative.     PMHx:   Past Medical History:   Diagnosis Date    Ascending aorta dilation (HCC)     CAD (coronary artery disease)     Chronic pain     High  cholesterol     Myocardial infarct (HCC)        PSHx:   Past Surgical History:   Procedure Laterality Date    MN INJ(S) NERVE BLOCK FEMORAL (INCLUDES IG) Right 2023    Procedure: Diagnostic RIGHT femoral and obturator nerve blocks with fluoroscopic guidance;  Surgeon: Brooke Brown M.D.;  [facility];  Service: Pain Management    MN DRAIN/INJECT LARGE JOINT/BURSA Right 2023    Procedure: Diagnostic and therapeutic Fluoroscopically guided intra-articular RIGHT hip injection;  Surgeon: Brooke Brown M.D.;  [facility];  Service: Pain Management    INJ,ANES LUMBAR/CERVICAL Right 3/21/2023    Procedure: Diagnostic medial branch blocks targeting the RIGHT L4-5 and L5-S1 facet joints with fluoroscopic guidance #1;  Surgeon: Brooke Brown M.D.;  [facility]: [facility];  Service: Pain Management    IRRIGATION & DEBRIDEMENT ORTHO Left 2021    Procedure: IRRIGATION AND DEBRIDEMENT, WOUND - HAND;  Surgeon: Guillermo Villalobos M.D.;  [facility];  Service: Orthopedics    Z CARDIAC CATH  2021    PCI to OM1     KNEE ARTHROSCOPY      SHOULDER ARTHROSCOPY         Family Hx:   Family History   Problem Relation Age of Onset    Heart Disease Mother     Heart Failure Mother        Social Hx:  Social History     Socioeconomic History    Marital status:      Spouse name: Not on file    Number of children: Not on file    Years of education: Not on file    Highest education level: Not on file   Occupational History    Not on file   Tobacco Use    Smoking status: Former     Types: Cigarettes     Quit date:      Years since quittin.3    Smokeless tobacco: Former     Types: Chew     Quit date:    Vaping Use    Vaping Use: Never used   Substance and Sexual Activity    Alcohol use: Not Currently    Drug use: No    Sexual activity: Not on file   Other Topics Concern    Not on file   Social History Narrative     Not on file     Social Determinants of Health     Financial Resource Strain: Not on file   Food Insecurity: Not on file   Transportation Needs: Not on file   Physical Activity: Not on file   Stress: Not on file   Social Connections: Not on file   Intimate Partner Violence: Not on file   Housing Stability: Not on file       Allergies:  No Known Allergies    Medications: reviewed on epic.   Outpatient Medications Marked as Taking for the 5/5/23 encounter (Telemedicine) with Brooke Brown M.D.   Medication Sig Dispense Refill    VITAMIN K PO Take 1 Tablet by mouth every day.      Glucosamine-Chondroit-Vit C-Mn (GLUCOSAMINE 1500 COMPLEX PO) Take 1 Tablet by mouth every day.      Turmeric 500 MG Cap Take 1 Capsule by mouth every day.      meloxicam (MOBIC) 15 MG tablet Take 1 Tablet by mouth every day. 30 Tablet 0    prasugrel (EFFIENT) 10 MG Tab TAKE ONE TABLET BY MOUTH EVERY DAY 90 Tablet 2    atorvastatin (LIPITOR) 80 MG tablet TAKE ONE TABLET BY MOUTH EVERY EVENING 90 Tablet 2    Cholecalciferol (VITAMIN D3) 755109 UNIT/GM Powder Take 1 Capsule by mouth every day.      aspirin EC 81 MG EC tablet Take 1 tablet by mouth every day. (Patient taking differently: Take 81 mg by mouth every evening.) 30 tablet 2    Ascorbic Acid (VITAMIN C PO) Take 1 tablet by mouth every day.      Cyanocobalamin (B-12 PO) Take 1 tablet by mouth every day.          Current Outpatient Medications on File Prior to Visit   Medication Sig Dispense Refill    VITAMIN K PO Take 1 Tablet by mouth every day.      Glucosamine-Chondroit-Vit C-Mn (GLUCOSAMINE 1500 COMPLEX PO) Take 1 Tablet by mouth every day.      Turmeric 500 MG Cap Take 1 Capsule by mouth every day.      meloxicam (MOBIC) 15 MG tablet Take 1 Tablet by mouth every day. 30 Tablet 0    prasugrel (EFFIENT) 10 MG Tab TAKE ONE TABLET BY MOUTH EVERY DAY 90 Tablet 2    atorvastatin (LIPITOR) 80 MG tablet TAKE ONE TABLET BY MOUTH EVERY EVENING 90 Tablet 2    Cholecalciferol (VITAMIN D3)  "730129 UNIT/GM Powder Take 1 Capsule by mouth every day.      aspirin EC 81 MG EC tablet Take 1 tablet by mouth every day. (Patient taking differently: Take 81 mg by mouth every evening.) 30 tablet 2    Ascorbic Acid (VITAMIN C PO) Take 1 tablet by mouth every day.      Cyanocobalamin (B-12 PO) Take 1 tablet by mouth every day.      celecoxib (CELEBREX) 200 MG Cap Take 1 Capsule by mouth 2 times daily with meals as needed for Moderate Pain or Inflammation. (Patient not taking: Reported on 4/27/2023) 90 Capsule 5    meloxicam (MOBIC) 15 MG tablet Take 1 Tablet by mouth every day. (Patient not taking: Reported on 4/27/2023) 30 Tablet 0     No current facility-administered medications on file prior to visit.         EXAMINATION     Physical Exam: -Limited due to video visit  Ht 1.727 m (5' 8\")   Wt 72.8 kg (160 lb 7.9 oz)     Constitutional:   Body Habitus: Body mass index is 24.4 kg/m².    Gen: no acute distress  HEENT: NCAT, EOMI  Resp: breathing comfortably on RA  Neuro: A+O x 4        Previous exam  Cooperation: Fully cooperates with exam  Appearance: Well-groomed, well-nourished.    Eyes: No scleral icterus to suggest severe liver disease, no proptosis to suggest severe hyperthyroidism    ENT -no obvious auditory deficits, no noticeable facial droop     Skin -no rashes or lesions noted     Respiratory-  breathing comfortably on room air, no audible wheezing    Cardiovascular-distal extremities warm and well perfused.  No lower extremity edema is noted.     Gastrointestinal - no obvious abdominal masses, non-distended    Psychiatric- alert and oriented ×3. Normal affect.     Gait - normal gait, no use of ambulatory device, nonantalgic. Heel walking and toe walking intact.     Musculoskeletal and Neuro -      Thoracic/Lumbar Spine/Sacral Spine/Hips     Gait - normal gait, no use of ambulatory device, nonantalgic.      Musculoskeletal and Neuro -      Thoracic/Lumbar Spine/Sacral Spine/Hips   Inspection: No evidence " of atrophy in bilateral lower extremities throughout       Palpation:   Focal tenderness to palpation over the right posterolateral glute.  No tenderness to palpation over lumbar facets on right spanning approximately L1-L5 levels. No tenderness to palpation elsewhere in the low back/hips including midline of lumbosacral spine, paraspinal muscles bilaterally, lumbar facets on left spanning approximately L1-L5 levels, sacroiliac joints bilaterally, PSIS bilaterally, and greater trochanters bilaterally.     Lumbar spine /hip provocative exam maneuvers  Straight leg raise negative bilaterally  FADIR test negative bilaterally     SI joint tests  EDWAR test negative bilaterally  Thigh thrust test negative bilaterally     Key points for the international standards for neurological classification of spinal cord injury (ISNCSCI) to light touch.   Dermatome R L   L2 2 2   L3 2 2   L4 2 2   L5 2 2   S1 2 2   S2 2 2         Motor Exam Lower Extremities  ? Myotome R L   Hip flexion L2 5 5   Knee extension L3 5 5   Ankle dorsiflexion L4 5 5   Toe extension L5 5 5   Ankle plantarflexion S1 5 5      Previous exam  There is full active range of motion with lumbar extension     Facet loading maneuver negative bilaterally    Reflexes  ?   R L   Patella   2+ 2+   Achilles    2+ 2+      Clonus of the ankle negative bilaterally            MEDICAL DECISION MAKING    Medical records review: see under HPI section.     DATA    Labs: No new labs available for review since last visit.   Lab Results   Component Value Date/Time    SODIUM 142 05/07/2022 10:10 AM    POTASSIUM 4.4 05/07/2022 10:10 AM    CHLORIDE 107 05/07/2022 10:10 AM    CO2 25 05/07/2022 10:10 AM    ANION 10.0 05/07/2022 10:10 AM    GLUCOSE 89 05/07/2022 10:10 AM    BUN 18 05/07/2022 10:10 AM    CREATININE 1.06 05/07/2022 10:10 AM    CALCIUM 9.0 05/07/2022 10:10 AM    ASTSGOT 18 12/09/2021 04:56 AM    ALTSGPT 21 12/09/2021 04:56 AM    TBILIRUBIN 0.3 12/09/2021 04:56 AM     ALBUMIN 3.8 12/09/2021 04:56 AM    TOTPROTEIN 6.0 12/09/2021 04:56 AM    GLOBULIN 2.2 12/09/2021 04:56 AM    AGRATIO 1.7 12/09/2021 04:56 AM       Lab Results   Component Value Date/Time    PROTHROMBTM 13.2 07/26/2021 02:59 AM    INR 1.03 07/26/2021 02:59 AM        Lab Results   Component Value Date/Time    WBC 8.7 12/09/2021 04:56 AM    RBC 4.20 (L) 12/09/2021 04:56 AM    HEMOGLOBIN 13.9 (L) 12/09/2021 04:56 AM    HEMATOCRIT 40.9 (L) 12/09/2021 04:56 AM    MCV 97.4 12/09/2021 04:56 AM    MCH 33.1 (H) 12/09/2021 04:56 AM    MCHC 34.0 12/09/2021 04:56 AM    MPV 9.7 12/09/2021 04:56 AM    NEUTSPOLYS 84.90 (H) 12/09/2021 04:56 AM    LYMPHOCYTES 8.10 (L) 12/09/2021 04:56 AM    MONOCYTES 6.60 12/09/2021 04:56 AM    EOSINOPHILS 0.00 12/09/2021 04:56 AM    BASOPHILS 0.10 12/09/2021 04:56 AM        No results found for: HBA1C     Imaging:   I personally reviewed following images, these are my reads  MRI lumbar spine 12/20/2022  Spondylolisthesis at L5-S1.  Disc space narrowing at L4-5 and L5-S1.  Disc bulge most notable at L4-5 and L5-S1.  Subacute compression fracture at superior endplate of L1.  Severe left-sided neuroforaminal stenosis at L5-S1, moderate right-sided neuroforaminal stenosis at L5-S1. Severe right-sided neuroforaminal stenosis at L4-5.  Facet arthropathy most notable at bilateral L4-5 and L5-S1. See formal radiology report for further details.     Unable to view images from XR lumbar spine 12/14/22 at the time of today's visit     IMAGING radiology reads. I reviewed the following radiology reads   MRI lumbar spine 12/20/2022         XR lumbar spine 12/14/22  AP lateral and flexion-extension views of the lumbar spine reveal severe   degenerative changes at the lumbar spine as well as what appears to be   chronic scoliosis/curvature.  These chronic findings make it difficult to   ascertain if there is an acute, nondisplaced fracture.         XR right hip 1/13/23  Multiple views of the hip including  "anterior-posterior and lateral views   reveal no evidence of an obvious displaced fracture or dislocation.  We   see significant cam deformity of the femoral head.  Also joint space   narrowing, sclerosis, and osteophyte formation significant for moderate to   severe osteoarthritis of the hip.      Diagnosis  Visit Diagnoses     ICD-10-CM   1. Right hip pain  M25.551   2. Osteoarthritis of right hip, unspecified osteoarthritis type  M16.11             ASSESSMENT AND PLAN:  Vasile Bowers (: 1950) is a male with  history of chronic low back pain who presents with worst pain at focal posterior lateral glute that corresponds to likely myalgia in etiology, but also possibly right hip joint osteoarthritis.  He has been offered a right hip replacement and would like to defer this if possible.     Vasile was seen today for follow-up.    Diagnoses and all orders for this visit:    Right hip pain  -     Referral to Pain Clinic    Osteoarthritis of right hip, unspecified osteoarthritis type  -     Referral to Pain Clinic              PLAN  Physical Therapy: Patient has done extensive physical therapy for this issue in the past and has been diligent with his home exercise program since.  Discussed that he should continue his home exercise program as able     Home Exercise Program: I previously provided the patient with a home exercise program focusing on strengthening and stretching.      Diagnostic workup: no new imaging needed at this time     Medications:   -The patient has tried several NSAIDs including Celebrex and Mobic, Tylenol, \"nerve blocker\" with no relief     Interventions:   -Radiofrequency ablation of right femoral and obturator nerve blocks under fluoroscopic guidance WITH SEDATION given 50% pain relief and improvement in ability to perform ADLs after diagnostic right femoral and obturator nerve blocks under fluoroscopic guidance. The risks, benefits, and alternatives to this procedure were " discussed and the patient wishes to proceed with the procedure. Risks include but are not limited to damage to surrounding structures, infection, bleeding, worsening of pain which can be permanent, and weakness which can be permanent. Benefits include pain relief and improved function. Alternatives include not doing the procedure.    -S/p diagnostic lumbar medial branch blocks targeting right L4-5 and L5-S1 facet joints with no significant improvement.  -Right hip intra-articular steroid injection under fluoroscopic guidance with 10-20% improvement in pain  -The patient reports he has received epidurals in the past with no relief at [place] and with Dr. Live.       Follow-up: 4 weeks after injection above    Orders Placed This Encounter    Referral to Pain Clinic       Brooke Brown MD  Interventional Pain and Spine  Physical Medicine and Rehabilitation  [facility]      The above note documents my personal evaluation of this patient. In addition, I have reviewed and confirmed with the patient and MA the supportive information documented in today's Patient Health Questionnaire and Office Note.     Please note that this dictation was created using voice recognition software. I have made every reasonable attempt to correct obvious errors, but I expect that there are errors of grammar and possibly content that I did not discover before finalizing the note.

## 2023-05-04 NOTE — H&P (VIEW-ONLY)
Follow-up patient Note    Interventional Pain and Spine  Physiatry (Physical Medicine and Rehabilitation)     Patient Name: Vasile Bowers  : 1950  Date of service: 2023    Chief Complaint:   Chief Complaint   Patient presents with    Follow-Up     Right hip pain       HISTORY (3/14/2023):  Vasile Bowers is a 72 y.o. male who presents today with chronic right-sided low back pain that does not radiate down the legs.  He denies numbness, tingling, or weakness in the legs.  He attributes the pain to years of skiing, snowboarding, and surfing.  He continues to remain physically active as much as he can.  He notes that nothing has helped with his pain including epidural steroid injections or Celebrex or meloxicam or physical therapy. He denies left sided low back pain.     Pain right now is 5/10 on the numeric pain scale. His pain at its best-worse level during the course of the day is typically 2-5/10, respectively.  Pain worsens with standing and walking and improves with sitting and bending forward.  He saw Dr. Heath who felt that surgical intervention was not warranted at this time with the absence of radiating pain and instead recommended right-sided L4-5 and L5-S1 facet joint ablations.  Of note he is also seeing [facility] for his right hip and has been offered a right hip replacement.  He notes hip injections have not helped in the past.      The patient has done physical therapy for this problem, most recently about 1 year ago with no relief. Consistently does stretching component of home exercise program      Patient has tried the following medications with varied success (current meds in bold):   Celebrex -no longer taking due to no relief  Meloxicam-no longer taking due to no relief  Nerve block (unknown type) -no longer taking due to no relief  Tylenol -no relief  Ibuprofen -no relief     Therapeutic modalities and interventional therapies to date include:  - epidural steroid  injection - no relief  - hip joint injection - no relief  -Of note he reports that he is anticipating receiving shoulder injections with [place] soon     Medical history includes NSTEMI, hypertension.    HPI  Today's visit   Vasile Bowers ( 1950) is a male with The primary encounter diagnosis was Right hip pain. A diagnosis of Osteoarthritis of right hip, unspecified osteoarthritis type was also pertinent to this visit.    Presents today after 23 Diagnostic right hip obturator and femoral nerve Blocks with 50% improvement in pain for anticipated duration of local anesthetic and improved ability to do ADLs.  Notes that the next day the pain returned as before.  Now interfering with ability to do ADLs including walking his dog.  He wants to proceed with radiofrequency ablation if possible.     He notes he has been offered a right hip replacement which he would like to avoid at this time.  Denies significant relief with hip joint steroid injections in the past.  Feels that maybe he received a trigger point injection in the past as well that did not help.  He requests to have some degree of pain relief as soon as possible.    Pain severity 2/10 currently at rest  Pt denies new numbness, tingling, or weakness.    Procedure history:  - 3/21/23  diagnostic lumbar medial branch blocks #1 targeting right L4-L5 and L5-S1 facet joints - no improvement  - 23 right hip steroid injection - 10-20% relief of pain  - 23 Diagnostic right hip obturator and femoral nerve Blocks - 50% improvement in pain, improvement in ability to perform ADLs      ROS:   Red Flags ROS:   Fever, Chills, Sweats: Denies  Involuntary Weight Loss: Denies  Bladder Incontinence: Denies  Bowel Incontinence: denies  Saddle Anesthesia: Denies    All other systems reviewed and negative.     PMHx:   Past Medical History:   Diagnosis Date    Ascending aorta dilation (HCC)     CAD (coronary artery disease)     Chronic pain     High  cholesterol     Myocardial infarct (HCC)        PSHx:   Past Surgical History:   Procedure Laterality Date    SC INJ(S) NERVE BLOCK FEMORAL (INCLUDES IG) Right 2023    Procedure: Diagnostic RIGHT femoral and obturator nerve blocks with fluoroscopic guidance;  Surgeon: Brooke Brown M.D.;  [facility];  Service: Pain Management    SC DRAIN/INJECT LARGE JOINT/BURSA Right 2023    Procedure: Diagnostic and therapeutic Fluoroscopically guided intra-articular RIGHT hip injection;  Surgeon: Brooke Brown M.D.;  Location[facility];  Service: Pain Management    INJ,ANES LUMBAR/CERVICAL Right 3/21/2023    Procedure: Diagnostic medial branch blocks targeting the RIGHT L4-5 and L5-S1 facet joints with fluoroscopic guidance #1;  Surgeon: Brooke Brown M.D.;  Location: [facility];  Service: Pain Management    IRRIGATION & DEBRIDEMENT ORTHO Left 2021    Procedure: IRRIGATION AND DEBRIDEMENT, WOUND - HAND;  Surgeon: Guillermo Villalobos M.D.;  Location: [facility];  Service: Orthopedics    Z CARDIAC CATH  2021    PCI to OM1     KNEE ARTHROSCOPY      SHOULDER ARTHROSCOPY         Family Hx:   Family History   Problem Relation Age of Onset    Heart Disease Mother     Heart Failure Mother        Social Hx:  Social History     Socioeconomic History    Marital status:      Spouse name: Not on file    Number of children: Not on file    Years of education: Not on file    Highest education level: Not on file   Occupational History    Not on file   Tobacco Use    Smoking status: Former     Types: Cigarettes     Quit date:      Years since quittin.3    Smokeless tobacco: Former     Types: Chew     Quit date:    Vaping Use    Vaping Use: Never used   Substance and Sexual Activity    Alcohol use: Not Currently    Drug use: No    Sexual activity: Not on file   Other Topics Concern    Not on file   Social History Narrative     Not on file     Social Determinants of Health     Financial Resource Strain: Not on file   Food Insecurity: Not on file   Transportation Needs: Not on file   Physical Activity: Not on file   Stress: Not on file   Social Connections: Not on file   Intimate Partner Violence: Not on file   Housing Stability: Not on file       Allergies:  No Known Allergies    Medications: reviewed on epic.   Outpatient Medications Marked as Taking for the 5/5/23 encounter (Telemedicine) with Brooke Brown M.D.   Medication Sig Dispense Refill    VITAMIN K PO Take 1 Tablet by mouth every day.      Glucosamine-Chondroit-Vit C-Mn (GLUCOSAMINE 1500 COMPLEX PO) Take 1 Tablet by mouth every day.      Turmeric 500 MG Cap Take 1 Capsule by mouth every day.      meloxicam (MOBIC) 15 MG tablet Take 1 Tablet by mouth every day. 30 Tablet 0    prasugrel (EFFIENT) 10 MG Tab TAKE ONE TABLET BY MOUTH EVERY DAY 90 Tablet 2    atorvastatin (LIPITOR) 80 MG tablet TAKE ONE TABLET BY MOUTH EVERY EVENING 90 Tablet 2    Cholecalciferol (VITAMIN D3) 657184 UNIT/GM Powder Take 1 Capsule by mouth every day.      aspirin EC 81 MG EC tablet Take 1 tablet by mouth every day. (Patient taking differently: Take 81 mg by mouth every evening.) 30 tablet 2    Ascorbic Acid (VITAMIN C PO) Take 1 tablet by mouth every day.      Cyanocobalamin (B-12 PO) Take 1 tablet by mouth every day.          Current Outpatient Medications on File Prior to Visit   Medication Sig Dispense Refill    VITAMIN K PO Take 1 Tablet by mouth every day.      Glucosamine-Chondroit-Vit C-Mn (GLUCOSAMINE 1500 COMPLEX PO) Take 1 Tablet by mouth every day.      Turmeric 500 MG Cap Take 1 Capsule by mouth every day.      meloxicam (MOBIC) 15 MG tablet Take 1 Tablet by mouth every day. 30 Tablet 0    prasugrel (EFFIENT) 10 MG Tab TAKE ONE TABLET BY MOUTH EVERY DAY 90 Tablet 2    atorvastatin (LIPITOR) 80 MG tablet TAKE ONE TABLET BY MOUTH EVERY EVENING 90 Tablet 2    Cholecalciferol (VITAMIN D3)  "733948 UNIT/GM Powder Take 1 Capsule by mouth every day.      aspirin EC 81 MG EC tablet Take 1 tablet by mouth every day. (Patient taking differently: Take 81 mg by mouth every evening.) 30 tablet 2    Ascorbic Acid (VITAMIN C PO) Take 1 tablet by mouth every day.      Cyanocobalamin (B-12 PO) Take 1 tablet by mouth every day.      celecoxib (CELEBREX) 200 MG Cap Take 1 Capsule by mouth 2 times daily with meals as needed for Moderate Pain or Inflammation. (Patient not taking: Reported on 4/27/2023) 90 Capsule 5    meloxicam (MOBIC) 15 MG tablet Take 1 Tablet by mouth every day. (Patient not taking: Reported on 4/27/2023) 30 Tablet 0     No current facility-administered medications on file prior to visit.         EXAMINATION     Physical Exam: -Limited due to video visit  Ht 1.727 m (5' 8\")   Wt 72.8 kg (160 lb 7.9 oz)     Constitutional:   Body Habitus: Body mass index is 24.4 kg/m².    Gen: no acute distress  HEENT: NCAT, EOMI  Resp: breathing comfortably on RA  Neuro: A+O x 4        Previous exam  Cooperation: Fully cooperates with exam  Appearance: Well-groomed, well-nourished.    Eyes: No scleral icterus to suggest severe liver disease, no proptosis to suggest severe hyperthyroidism    ENT -no obvious auditory deficits, no noticeable facial droop     Skin -no rashes or lesions noted     Respiratory-  breathing comfortably on room air, no audible wheezing    Cardiovascular-distal extremities warm and well perfused.  No lower extremity edema is noted.     Gastrointestinal - no obvious abdominal masses, non-distended    Psychiatric- alert and oriented ×3. Normal affect.     Gait - normal gait, no use of ambulatory device, nonantalgic. Heel walking and toe walking intact.     Musculoskeletal and Neuro -      Thoracic/Lumbar Spine/Sacral Spine/Hips     Gait - normal gait, no use of ambulatory device, nonantalgic.      Musculoskeletal and Neuro -      Thoracic/Lumbar Spine/Sacral Spine/Hips   Inspection: No evidence " of atrophy in bilateral lower extremities throughout       Palpation:   Focal tenderness to palpation over the right posterolateral glute.  No tenderness to palpation over lumbar facets on right spanning approximately L1-L5 levels. No tenderness to palpation elsewhere in the low back/hips including midline of lumbosacral spine, paraspinal muscles bilaterally, lumbar facets on left spanning approximately L1-L5 levels, sacroiliac joints bilaterally, PSIS bilaterally, and greater trochanters bilaterally.     Lumbar spine /hip provocative exam maneuvers  Straight leg raise negative bilaterally  FADIR test negative bilaterally     SI joint tests  EDWAR test negative bilaterally  Thigh thrust test negative bilaterally     Key points for the international standards for neurological classification of spinal cord injury (ISNCSCI) to light touch.   Dermatome R L   L2 2 2   L3 2 2   L4 2 2   L5 2 2   S1 2 2   S2 2 2         Motor Exam Lower Extremities  ? Myotome R L   Hip flexion L2 5 5   Knee extension L3 5 5   Ankle dorsiflexion L4 5 5   Toe extension L5 5 5   Ankle plantarflexion S1 5 5      Previous exam  There is full active range of motion with lumbar extension     Facet loading maneuver negative bilaterally    Reflexes  ?   R L   Patella   2+ 2+   Achilles    2+ 2+      Clonus of the ankle negative bilaterally            MEDICAL DECISION MAKING    Medical records review: see under HPI section.     DATA    Labs: No new labs available for review since last visit.   Lab Results   Component Value Date/Time    SODIUM 142 05/07/2022 10:10 AM    POTASSIUM 4.4 05/07/2022 10:10 AM    CHLORIDE 107 05/07/2022 10:10 AM    CO2 25 05/07/2022 10:10 AM    ANION 10.0 05/07/2022 10:10 AM    GLUCOSE 89 05/07/2022 10:10 AM    BUN 18 05/07/2022 10:10 AM    CREATININE 1.06 05/07/2022 10:10 AM    CALCIUM 9.0 05/07/2022 10:10 AM    ASTSGOT 18 12/09/2021 04:56 AM    ALTSGPT 21 12/09/2021 04:56 AM    TBILIRUBIN 0.3 12/09/2021 04:56 AM     ALBUMIN 3.8 12/09/2021 04:56 AM    TOTPROTEIN 6.0 12/09/2021 04:56 AM    GLOBULIN 2.2 12/09/2021 04:56 AM    AGRATIO 1.7 12/09/2021 04:56 AM       Lab Results   Component Value Date/Time    PROTHROMBTM 13.2 07/26/2021 02:59 AM    INR 1.03 07/26/2021 02:59 AM        Lab Results   Component Value Date/Time    WBC 8.7 12/09/2021 04:56 AM    RBC 4.20 (L) 12/09/2021 04:56 AM    HEMOGLOBIN 13.9 (L) 12/09/2021 04:56 AM    HEMATOCRIT 40.9 (L) 12/09/2021 04:56 AM    MCV 97.4 12/09/2021 04:56 AM    MCH 33.1 (H) 12/09/2021 04:56 AM    MCHC 34.0 12/09/2021 04:56 AM    MPV 9.7 12/09/2021 04:56 AM    NEUTSPOLYS 84.90 (H) 12/09/2021 04:56 AM    LYMPHOCYTES 8.10 (L) 12/09/2021 04:56 AM    MONOCYTES 6.60 12/09/2021 04:56 AM    EOSINOPHILS 0.00 12/09/2021 04:56 AM    BASOPHILS 0.10 12/09/2021 04:56 AM        No results found for: HBA1C     Imaging:   I personally reviewed following images, these are my reads  MRI lumbar spine 12/20/2022  Spondylolisthesis at L5-S1.  Disc space narrowing at L4-5 and L5-S1.  Disc bulge most notable at L4-5 and L5-S1.  Subacute compression fracture at superior endplate of L1.  Severe left-sided neuroforaminal stenosis at L5-S1, moderate right-sided neuroforaminal stenosis at L5-S1. Severe right-sided neuroforaminal stenosis at L4-5.  Facet arthropathy most notable at bilateral L4-5 and L5-S1. See formal radiology report for further details.     Unable to view images from XR lumbar spine 12/14/22 at the time of today's visit     IMAGING radiology reads. I reviewed the following radiology reads   MRI lumbar spine 12/20/2022         XR lumbar spine 12/14/22  AP lateral and flexion-extension views of the lumbar spine reveal severe   degenerative changes at the lumbar spine as well as what appears to be   chronic scoliosis/curvature.  These chronic findings make it difficult to   ascertain if there is an acute, nondisplaced fracture.         XR right hip 1/13/23  Multiple views of the hip including  "anterior-posterior and lateral views   reveal no evidence of an obvious displaced fracture or dislocation.  We   see significant cam deformity of the femoral head.  Also joint space   narrowing, sclerosis, and osteophyte formation significant for moderate to   severe osteoarthritis of the hip.      Diagnosis  Visit Diagnoses     ICD-10-CM   1. Right hip pain  M25.551   2. Osteoarthritis of right hip, unspecified osteoarthritis type  M16.11             ASSESSMENT AND PLAN:  Vasile Bowers (: 1950) is a male with  history of chronic low back pain who presents with worst pain at focal posterior lateral glute that corresponds to likely myalgia in etiology, but also possibly right hip joint osteoarthritis.  He has been offered a right hip replacement and would like to defer this if possible.     Vasile was seen today for follow-up.    Diagnoses and all orders for this visit:    Right hip pain  -     Referral to Pain Clinic    Osteoarthritis of right hip, unspecified osteoarthritis type  -     Referral to Pain Clinic              PLAN  Physical Therapy: Patient has done extensive physical therapy for this issue in the past and has been diligent with his home exercise program since.  Discussed that he should continue his home exercise program as able     Home Exercise Program: I previously provided the patient with a home exercise program focusing on strengthening and stretching.      Diagnostic workup: no new imaging needed at this time     Medications:   -The patient has tried several NSAIDs including Celebrex and Mobic, Tylenol, \"nerve blocker\" with no relief     Interventions:   -Radiofrequency ablation of right femoral and obturator nerve blocks under fluoroscopic guidance WITH SEDATION given 50% pain relief and improvement in ability to perform ADLs after diagnostic right femoral and obturator nerve blocks under fluoroscopic guidance. The risks, benefits, and alternatives to this procedure were " discussed and the patient wishes to proceed with the procedure. Risks include but are not limited to damage to surrounding structures, infection, bleeding, worsening of pain which can be permanent, and weakness which can be permanent. Benefits include pain relief and improved function. Alternatives include not doing the procedure.    -S/p diagnostic lumbar medial branch blocks targeting right L4-5 and L5-S1 facet joints with no significant improvement.  -Right hip intra-articular steroid injection under fluoroscopic guidance with 10-20% improvement in pain  -The patient reports he has received epidurals in the past with no relief at [facility] and with Dr. Live.       Follow-up: 4 weeks after injection above    Orders Placed This Encounter    Referral to Pain Clinic       Brooke Brown MD  Interventional Pain and Spine  Physical Medicine and Rehabilitation  [facility]      The above note documents my personal evaluation of this patient. In addition, I have reviewed and confirmed with the patient and MA the supportive information documented in today's Patient Health Questionnaire and Office Note.     Please note that this dictation was created using voice recognition software. I have made every reasonable attempt to correct obvious errors, but I expect that there are errors of grammar and possibly content that I did not discover before finalizing the note.

## 2023-05-05 ENCOUNTER — TELEMEDICINE (OUTPATIENT)
Dept: PHYSICAL MEDICINE AND REHAB | Facility: MEDICAL CENTER | Age: 73
End: 2023-05-05
Payer: MEDICARE

## 2023-05-05 VITALS — WEIGHT: 160.5 LBS | HEIGHT: 68 IN | BODY MASS INDEX: 24.32 KG/M2

## 2023-05-05 DIAGNOSIS — M16.11 OSTEOARTHRITIS OF RIGHT HIP, UNSPECIFIED OSTEOARTHRITIS TYPE: ICD-10-CM

## 2023-05-05 DIAGNOSIS — M25.551 RIGHT HIP PAIN: Primary | ICD-10-CM

## 2023-05-05 PROCEDURE — 99024 POSTOP FOLLOW-UP VISIT: CPT | Mod: 95 | Performed by: STUDENT IN AN ORGANIZED HEALTH CARE EDUCATION/TRAINING PROGRAM

## 2023-05-05 ASSESSMENT — FIBROSIS 4 INDEX: FIB4 SCORE: 1.79

## 2023-05-05 ASSESSMENT — PAIN SCALES - GENERAL: PAINLEVEL: NO PAIN

## 2023-05-05 ASSESSMENT — PATIENT HEALTH QUESTIONNAIRE - PHQ9: CLINICAL INTERPRETATION OF PHQ2 SCORE: 0

## 2023-05-09 ENCOUNTER — TELEPHONE (OUTPATIENT)
Dept: PAIN MANAGEMENT | Facility: REHABILITATION | Age: 73
End: 2023-05-09

## 2023-05-09 ENCOUNTER — OFFICE VISIT (OUTPATIENT)
Dept: CARDIOLOGY | Facility: MEDICAL CENTER | Age: 73
End: 2023-05-09
Payer: MEDICARE

## 2023-05-09 VITALS
HEIGHT: 68 IN | OXYGEN SATURATION: 97 % | SYSTOLIC BLOOD PRESSURE: 102 MMHG | WEIGHT: 162 LBS | BODY MASS INDEX: 24.55 KG/M2 | HEART RATE: 52 BPM | RESPIRATION RATE: 16 BRPM | DIASTOLIC BLOOD PRESSURE: 62 MMHG

## 2023-05-09 DIAGNOSIS — E78.5 HYPERLIPIDEMIA, UNSPECIFIED HYPERLIPIDEMIA TYPE: ICD-10-CM

## 2023-05-09 DIAGNOSIS — Z95.5 STENTED CORONARY ARTERY: ICD-10-CM

## 2023-05-09 DIAGNOSIS — I25.10 CORONARY ARTERY DISEASE INVOLVING NATIVE CORONARY ARTERY OF NATIVE HEART WITHOUT ANGINA PECTORIS: ICD-10-CM

## 2023-05-09 DIAGNOSIS — I77.810 ASCENDING AORTA DILATION (HCC): ICD-10-CM

## 2023-05-09 DIAGNOSIS — I10 ESSENTIAL HYPERTENSION, BENIGN: ICD-10-CM

## 2023-05-09 PROCEDURE — 99214 OFFICE O/P EST MOD 30 MIN: CPT | Performed by: PHYSICIAN ASSISTANT

## 2023-05-09 PROCEDURE — 3074F SYST BP LT 130 MM HG: CPT | Performed by: PHYSICIAN ASSISTANT

## 2023-05-09 PROCEDURE — 1126F AMNT PAIN NOTED NONE PRSNT: CPT | Performed by: PHYSICIAN ASSISTANT

## 2023-05-09 PROCEDURE — 3078F DIAST BP <80 MM HG: CPT | Performed by: PHYSICIAN ASSISTANT

## 2023-05-09 ASSESSMENT — FIBROSIS 4 INDEX: FIB4 SCORE: 1.79

## 2023-05-09 NOTE — TELEPHONE ENCOUNTER
Caller: Vasile Bowers    Topic/issue: Patient is returning missed call to schedule procedure. Unable to reach  or a voicemail for patient to leave a message. Please advise.     Callback Number: [phone] (home)     Thank you,   Audrey GAMEZ

## 2023-05-09 NOTE — PROGRESS NOTES
Chief Complaint   Patient presents with    Coronary Artery Disease     F/V Dx: Coronary artery disease involving native coronary artery of native heart without angina pectoris       Subjective     Vasile Alpesh Bowers is a 72 y.o. male with a history of CAD s/p PCI to OM1 in 2021, hypertension, hyperlipidemia, and ascending aorta dilatation who presents today for yearly follow-up.    Primary cardiologist is Dr. Benson.  Last seen 4/29/2022.  At that exam, he was feeling well and was without any cardiac symptoms.  Based on his prior history of PCI, further guideline directed medical therapy was recommended; however, patient preferred to not be on any additional medications.  His current medications were continued and he was to follow-up in 1 year.    Today, he reports he is doing well other than joint pain. He does not have any cardiac complaints and stays very active exercising and skiing without any symptoms. No chest pain or palpitations. No shortness of breath, dyspnea on exertion, orthopnea or PND. No lower extremity edema. No dizziness or lightheadedness. No syncope or presyncope.      Past Medical History:   Diagnosis Date    Ascending aorta dilation (HCC)     CAD (coronary artery disease)     Chronic pain     High cholesterol     Myocardial infarct (HCC)      Past Surgical History:   Procedure Laterality Date    MD INJ(S) NERVE BLOCK FEMORAL (INCLUDES IG) Right 5/2/2023    Procedure: Diagnostic RIGHT femoral and obturator nerve blocks with fluoroscopic guidance;  Surgeon: Brooke Brown M.D.;  [facility];  Service: Pain Management    MD DRAIN/INJECT LARGE JOINT/BURSA Right 4/11/2023    Procedure: Diagnostic and therapeutic Fluoroscopically guided intra-articular RIGHT hip injection;  Surgeon: Brooke Brown M.D.;  Location[facility];  Service: Pain Management    INJ,ANES LUMBAR/CERVICAL Right 3/21/2023    Procedure: Diagnostic medial branch blocks targeting the  RIGHT L4-5 and L5-S1 facet joints with fluoroscopic guidance #1;  Surgeon: Brooke Brown M.D.;  [facility];  Service: Pain Management    IRRIGATION & DEBRIDEMENT ORTHO Left 2021    Procedure: IRRIGATION AND DEBRIDEMENT, WOUND - HAND;  Surgeon: Guillermo Villalobos M.D.;  Location: [facility];  Service: Orthopedics    ZZZ CARDIAC CATH  2021    PCI to OM1     KNEE ARTHROSCOPY      SHOULDER ARTHROSCOPY       Family History   Problem Relation Age of Onset    Heart Disease Mother     Heart Failure Mother      Social History     Socioeconomic History    Marital status:      Spouse name: Not on file    Number of children: Not on file    Years of education: Not on file    Highest education level: Not on file   Occupational History    Not on file   Tobacco Use    Smoking status: Former     Types: Cigarettes     Quit date:      Years since quittin.3    Smokeless tobacco: Former     Types: Chew     Quit date:    Vaping Use    Vaping Use: Never used   Substance and Sexual Activity    Alcohol use: Not Currently    Drug use: No    Sexual activity: Not on file   Other Topics Concern    Not on file   Social History Narrative    Not on file     Social Determinants of Health     Financial Resource Strain: Not on file   Food Insecurity: Not on file   Transportation Needs: Not on file   Physical Activity: Not on file   Stress: Not on file   Social Connections: Not on file   Intimate Partner Violence: Not on file   Housing Stability: Not on file     No Known Allergies  Outpatient Encounter Medications as of 2023   Medication Sig Dispense Refill    VITAMIN K PO Take 1 Tablet by mouth every day.      Glucosamine-Chondroit-Vit C-Mn (GLUCOSAMINE 1500 COMPLEX PO) Take 1 Tablet by mouth every day.      Turmeric 500 MG Cap Take 1 Capsule by mouth every day.      atorvastatin (LIPITOR) 80 MG tablet TAKE ONE TABLET BY MOUTH EVERY EVENING 90 Tablet 2    Cholecalciferol  "(VITAMIN D3) 685044 UNIT/GM Powder Take 1 Capsule by mouth every day.      aspirin EC 81 MG EC tablet Take 1 tablet by mouth every day. (Patient taking differently: Take 81 mg by mouth every evening.) 30 tablet 2    Ascorbic Acid (VITAMIN C PO) Take 1 tablet by mouth every day.      Cyanocobalamin (B-12 PO) Take 1 tablet by mouth every day.      [DISCONTINUED] celecoxib (CELEBREX) 200 MG Cap Take 1 Capsule by mouth 2 times daily with meals as needed for Moderate Pain or Inflammation. (Patient not taking: Reported on 4/27/2023) 90 Capsule 5    [DISCONTINUED] meloxicam (MOBIC) 15 MG tablet Take 1 Tablet by mouth every day. (Patient not taking: Reported on 4/27/2023) 30 Tablet 0    [DISCONTINUED] meloxicam (MOBIC) 15 MG tablet Take 1 Tablet by mouth every day. (Patient not taking: Reported on 5/9/2023) 30 Tablet 0    [DISCONTINUED] prasugrel (EFFIENT) 10 MG Tab TAKE ONE TABLET BY MOUTH EVERY DAY 90 Tablet 2     No facility-administered encounter medications on file as of 5/9/2023.     Review of Systems   Constitutional: Negative.  Negative for chills, fever and malaise/fatigue.   HENT: Negative.     Eyes: Negative.  Negative for blurred vision and double vision.   Respiratory:  Negative for cough and shortness of breath.    Cardiovascular:  Negative for chest pain, palpitations, orthopnea, claudication, leg swelling and PND.   Gastrointestinal: Negative.  Negative for abdominal pain, nausea and vomiting.   Genitourinary: Negative.    Musculoskeletal:  Positive for joint pain. Negative for myalgias.   Skin: Negative.  Negative for rash.   Neurological: Negative.  Negative for dizziness, loss of consciousness, weakness and headaches.   Endo/Heme/Allergies: Negative.  Does not bruise/bleed easily.   Psychiatric/Behavioral: Negative.                Objective     /62 (BP Location: Left arm, Patient Position: Sitting, BP Cuff Size: Adult)   Pulse (!) 52   Resp 16   Ht 1.727 m (5' 8\")   Wt 73.5 kg (162 lb)   SpO2 " 97%   BMI 24.63 kg/m²     Physical Exam  Vitals reviewed.   Constitutional:       General: He is not in acute distress.     Appearance: Normal appearance.   HENT:      Head: Normocephalic and atraumatic.      Right Ear: External ear normal.      Left Ear: External ear normal.   Eyes:      General: No scleral icterus.     Extraocular Movements: Extraocular movements intact.      Conjunctiva/sclera: Conjunctivae normal.      Pupils: Pupils are equal, round, and reactive to light.   Cardiovascular:      Rate and Rhythm: Normal rate and regular rhythm.      Pulses: Normal pulses.      Heart sounds: Normal heart sounds. No murmur heard.     No friction rub. No gallop.   Pulmonary:      Effort: Pulmonary effort is normal.      Breath sounds: Normal breath sounds.   Abdominal:      General: Bowel sounds are normal.      Palpations: Abdomen is soft.      Tenderness: There is no abdominal tenderness.   Musculoskeletal:         General: Normal range of motion.      Cervical back: Normal range of motion and neck supple.      Right lower leg: No edema.      Left lower leg: No edema.   Skin:     General: Skin is warm and dry.      Capillary Refill: Capillary refill takes less than 2 seconds.   Neurological:      General: No focal deficit present.      Mental Status: He is alert and oriented to person, place, and time.   Psychiatric:         Mood and Affect: Mood normal.         Behavior: Behavior normal.         Judgment: Judgment normal.         Lab Results   Component Value Date/Time    CHOLSTRLTOT 115 05/07/2022 10:10 AM    LDL 42 05/07/2022 10:10 AM    HDL 68 05/07/2022 10:10 AM    TRIGLYCERIDE 27 05/07/2022 10:10 AM       Lab Results   Component Value Date/Time    SODIUM 142 05/07/2022 10:10 AM    POTASSIUM 4.4 05/07/2022 10:10 AM    CHLORIDE 107 05/07/2022 10:10 AM    CO2 25 05/07/2022 10:10 AM    GLUCOSE 89 05/07/2022 10:10 AM    BUN 18 05/07/2022 10:10 AM    CREATININE 1.06 05/07/2022 10:10 AM     Lab Results    Component Value Date/Time    ALKPHOSPHAT 74 12/09/2021 04:56 AM    ASTSGOT 18 12/09/2021 04:56 AM    ALTSGPT 21 12/09/2021 04:56 AM    TBILIRUBIN 0.3 12/09/2021 04:56 AM       Cardiovascular imaging and procedures:    Echocardiogram 7/6/2021:  CONCLUSIONS  No prior study is available for comparison.   Left ventricular ejection fraction is visually estimated to be 55%.  Unable to estimate pulmonary artery pressure due to an inadequate tricuspid regurgitant jet.    [facility] 7/26/2021  POSTOPERATIVE DIAGNOSIS:  98% focal culprit OM 1 stenosis  Single-vessel coronary disease  LVEF 65%, LVEDP 10 mmHg  Successful PCI culprit OM1 (2.25 x 15 mm [place] FERNANDA), excellent result  RECOMMENDATIONS:  Guideline directed medical therapy   Cardiovascular Risk factor modification  Dual antiplatelet therapy for 1 year  FINDINGS:  I.  HEMODYNAMICS              Ao: 82/59 mmHg              LEDP: 10 mmHg              Gradient on LV pullback: No  II. CORONARY ANGIOGRAPHY:  Left main coronary artery: Large caliber bifurcating no CAD.  Left anterior descending artery: Large caliber usual complement diagonals no CAD.  Left circumflex coronary artery: Moderate caliber nondominant supplying a long moderate caliber OM1 with a 98% focal midportion stenosis.  Postintervention 0% residual stenosis in the stented segment and GUICHO-3 flow.  Right coronary artery: Large caliber vessel no significant epicardial CAD.  III.  LEFT VENTRICULOGRAM:  LVEF GONZALEZ PROJECTION: 60%         Assessment & Plan     1. Coronary artery disease involving native coronary artery of native heart without angina pectoris  Lipid Profile    Comp Metabolic Panel    CT-CTA CHEST WITH & W/O-POST PROCESS      2. Stented coronary artery        3. Essential hypertension, benign  Lipid Profile    Comp Metabolic Panel    CT-CTA CHEST WITH & W/O-POST PROCESS      4. Hyperlipidemia, unspecified hyperlipidemia type        5. Ascending aorta dilation (HCC)  CT-CTA CHEST WITH & W/O-POST PROCESS           Medical Decision Making: Today's Assessment/Status/Plan:        CAD s/p PCI to OM1 in 2021  -Asymptomatic  - LVEF 55% in 2021  -Continue aspirin 81mg daily and atorvastatin 80mg daily.Completed over 1 year of DAPT. He can stop effient.   - He should be on a BB for goal directed therapy, but he has remained off of this per his wishes.    Hypertension  - Blood pressure is currently well controlled without antihypertensives.     Hyperlipidemia  - Last LDL 42; goal <70  - Repeat CMP and lipid panel  - Continue atorvastatin 80mg daily    Ascending aorta dilatation  - 4.2 x 4.1 on CTA 7/25/2021  - Repeat CTA    Follow up in 1 year.    Encouraged him to reach out via [employer] with any questions or concerns.    Thank you for allowing me to participate in the care of Vasile Bowers .    Vanessa Smith PA-C, Cardiology  [facility], [address]  [address] [address]  Phone: [phone]  Fax: [phone]    PLEASE NOTE: This Note was created using voice recognition Software. I have made every reasonable attempt to correct obvious errors, but I expect that there are errors of grammar and possibly content that I did not discover before finalizing the note.

## 2023-05-11 ENCOUNTER — TELEPHONE (OUTPATIENT)
Dept: PHYSICAL MEDICINE AND REHAB | Facility: MEDICAL CENTER | Age: 73
End: 2023-05-11

## 2023-05-11 DIAGNOSIS — M16.11 PRIMARY OSTEOARTHRITIS OF RIGHT HIP: ICD-10-CM

## 2023-05-11 DIAGNOSIS — S76.219A STRAIN OF ADDUCTOR MUSCLE OF THIGH: ICD-10-CM

## 2023-05-11 DIAGNOSIS — M25.551 RIGHT HIP PAIN: ICD-10-CM

## 2023-05-11 PROCEDURE — 1126F AMNT PAIN NOTED NONE PRSNT: CPT | Performed by: STUDENT IN AN ORGANIZED HEALTH CARE EDUCATION/TRAINING PROGRAM

## 2023-05-11 RX ORDER — MELOXICAM 15 MG/1
15 TABLET ORAL
Qty: 30 TABLET | Refills: 2 | Status: ON HOLD
Start: 2023-05-11 | End: 2023-05-30

## 2023-05-11 NOTE — TELEPHONE ENCOUNTER
Patient called and is asking for Meloxicam or Celebrex.      He needs something stronger then what he has. He is travelling.    Please call at [phone]        Thank you         Chayo

## 2023-05-12 ENCOUNTER — TELEPHONE (OUTPATIENT)
Dept: PHYSICAL MEDICINE AND REHAB | Facility: MEDICAL CENTER | Age: 73
End: 2023-05-12
Payer: MEDICARE

## 2023-05-12 NOTE — TELEPHONE ENCOUNTER
Caller Name: Vasile BOWMAN  Call Back Number:     How would the patient prefer to be contacted with a response: Phone call OK to leave a detailed message    Pt called saying the meloxicam doesn't help him with his pain at all. He is requesting a stronger/different medication. Please advise.

## 2023-05-12 NOTE — TELEPHONE ENCOUNTER
The previous message had indicated he was specifically requesting meloxicam or celebrex. I can prescribe celebrex if he would like. Can you please call him and ask if he would like celebrex instead?

## 2023-05-13 RX ORDER — DICLOFENAC SODIUM 75 MG/1
75 TABLET, DELAYED RELEASE ORAL 2 TIMES DAILY
Qty: 60 TABLET | Refills: 0 | Status: SHIPPED | OUTPATIENT
Start: 2023-05-13 | End: 2023-07-11

## 2023-05-13 ASSESSMENT — ENCOUNTER SYMPTOMS
ORTHOPNEA: 0
DIZZINESS: 0
SHORTNESS OF BREATH: 0
VOMITING: 0
GASTROINTESTINAL NEGATIVE: 1
FEVER: 0
DOUBLE VISION: 0
MYALGIAS: 0
PND: 0
COUGH: 0
CLAUDICATION: 0
NEUROLOGICAL NEGATIVE: 1
CHILLS: 0
BRUISES/BLEEDS EASILY: 0
HEADACHES: 0
NAUSEA: 0
LOSS OF CONSCIOUSNESS: 0
CONSTITUTIONAL NEGATIVE: 1
ABDOMINAL PAIN: 0
BLURRED VISION: 0
PALPITATIONS: 0
WEAKNESS: 0
EYES NEGATIVE: 1
PSYCHIATRIC NEGATIVE: 1

## 2023-05-17 ENCOUNTER — HOSPITAL ENCOUNTER (OUTPATIENT)
Dept: LAB | Facility: MEDICAL CENTER | Age: 73
End: 2023-05-17
Attending: PHYSICIAN ASSISTANT
Payer: MEDICARE

## 2023-05-17 DIAGNOSIS — I25.10 CORONARY ARTERY DISEASE INVOLVING NATIVE CORONARY ARTERY OF NATIVE HEART WITHOUT ANGINA PECTORIS: ICD-10-CM

## 2023-05-17 DIAGNOSIS — I10 ESSENTIAL HYPERTENSION, BENIGN: ICD-10-CM

## 2023-05-17 LAB
ALBUMIN SERPL BCP-MCNC: 3.8 G/DL (ref 3.2–4.9)
ALBUMIN/GLOB SERPL: 1.5 G/DL
ALP SERPL-CCNC: 118 U/L (ref 30–99)
ALT SERPL-CCNC: 28 U/L (ref 2–50)
ANION GAP SERPL CALC-SCNC: 9 MMOL/L (ref 7–16)
AST SERPL-CCNC: 27 U/L (ref 12–45)
BILIRUB SERPL-MCNC: 0.5 MG/DL (ref 0.1–1.5)
BUN SERPL-MCNC: 28 MG/DL (ref 8–22)
CALCIUM ALBUM COR SERPL-MCNC: 8.9 MG/DL (ref 8.5–10.5)
CALCIUM SERPL-MCNC: 8.7 MG/DL (ref 8.5–10.5)
CHLORIDE SERPL-SCNC: 112 MMOL/L (ref 96–112)
CHOLEST SERPL-MCNC: 115 MG/DL (ref 100–199)
CO2 SERPL-SCNC: 24 MMOL/L (ref 20–33)
CREAT SERPL-MCNC: 0.96 MG/DL (ref 0.5–1.4)
FASTING STATUS PATIENT QL REPORTED: NORMAL
GFR SERPLBLD CREATININE-BSD FMLA CKD-EPI: 84 ML/MIN/1.73 M 2
GLOBULIN SER CALC-MCNC: 2.6 G/DL (ref 1.9–3.5)
GLUCOSE SERPL-MCNC: 87 MG/DL (ref 65–99)
HDLC SERPL-MCNC: 60 MG/DL
LDLC SERPL CALC-MCNC: 48 MG/DL
POTASSIUM SERPL-SCNC: 5 MMOL/L (ref 3.6–5.5)
PROT SERPL-MCNC: 6.4 G/DL (ref 6–8.2)
SODIUM SERPL-SCNC: 145 MMOL/L (ref 135–145)
TRIGL SERPL-MCNC: 36 MG/DL (ref 0–149)

## 2023-05-17 PROCEDURE — 36415 COLL VENOUS BLD VENIPUNCTURE: CPT

## 2023-05-17 PROCEDURE — 80061 LIPID PANEL: CPT

## 2023-05-17 PROCEDURE — 80053 COMPREHEN METABOLIC PANEL: CPT

## 2023-05-30 ENCOUNTER — PATIENT MESSAGE (OUTPATIENT)
Dept: CARDIOLOGY | Facility: MEDICAL CENTER | Age: 73
End: 2023-05-30
Payer: MEDICARE

## 2023-05-30 ENCOUNTER — APPOINTMENT (OUTPATIENT)
Dept: RADIOLOGY | Facility: REHABILITATION | Age: 73
End: 2023-05-30
Attending: STUDENT IN AN ORGANIZED HEALTH CARE EDUCATION/TRAINING PROGRAM
Payer: MEDICARE

## 2023-05-30 ENCOUNTER — HOSPITAL ENCOUNTER (OUTPATIENT)
Dept: RADIOLOGY | Facility: MEDICAL CENTER | Age: 73
End: 2023-05-30
Attending: PHYSICIAN ASSISTANT
Payer: MEDICARE

## 2023-05-30 ENCOUNTER — HOSPITAL ENCOUNTER (OUTPATIENT)
Facility: REHABILITATION | Age: 73
End: 2023-05-30
Attending: STUDENT IN AN ORGANIZED HEALTH CARE EDUCATION/TRAINING PROGRAM | Admitting: STUDENT IN AN ORGANIZED HEALTH CARE EDUCATION/TRAINING PROGRAM
Payer: MEDICARE

## 2023-05-30 VITALS
BODY MASS INDEX: 24.06 KG/M2 | HEIGHT: 68 IN | OXYGEN SATURATION: 100 % | WEIGHT: 158.73 LBS | SYSTOLIC BLOOD PRESSURE: 144 MMHG | DIASTOLIC BLOOD PRESSURE: 78 MMHG | RESPIRATION RATE: 16 BRPM | TEMPERATURE: 98.1 F | HEART RATE: 42 BPM

## 2023-05-30 DIAGNOSIS — I25.10 CORONARY ARTERY DISEASE INVOLVING NATIVE CORONARY ARTERY OF NATIVE HEART WITHOUT ANGINA PECTORIS: ICD-10-CM

## 2023-05-30 DIAGNOSIS — I77.810 ASCENDING AORTA DILATION (HCC): ICD-10-CM

## 2023-05-30 DIAGNOSIS — I10 ESSENTIAL HYPERTENSION, BENIGN: ICD-10-CM

## 2023-05-30 PROCEDURE — 700111 HCHG RX REV CODE 636 W/ 250 OVERRIDE (IP)

## 2023-05-30 PROCEDURE — 71275 CT ANGIOGRAPHY CHEST: CPT

## 2023-05-30 PROCEDURE — 99152 MOD SED SAME PHYS/QHP 5/>YRS: CPT

## 2023-05-30 PROCEDURE — 64640 INJECTION TREATMENT OF NERVE: CPT

## 2023-05-30 PROCEDURE — 700117 HCHG RX CONTRAST REV CODE 255: Performed by: PHYSICIAN ASSISTANT

## 2023-05-30 PROCEDURE — 99153 MOD SED SAME PHYS/QHP EA: CPT

## 2023-05-30 RX ORDER — MIDAZOLAM HYDROCHLORIDE 1 MG/ML
INJECTION INTRAMUSCULAR; INTRAVENOUS
Status: COMPLETED
Start: 2023-05-30 | End: 2023-05-30

## 2023-05-30 RX ORDER — DEXAMETHASONE SODIUM PHOSPHATE 10 MG/ML
INJECTION, SOLUTION INTRAMUSCULAR; INTRAVENOUS
Status: COMPLETED
Start: 2023-05-30 | End: 2023-05-30

## 2023-05-30 RX ORDER — LIDOCAINE HYDROCHLORIDE 10 MG/ML
INJECTION, SOLUTION EPIDURAL; INFILTRATION; INTRACAUDAL; PERINEURAL
Status: COMPLETED
Start: 2023-05-30 | End: 2023-05-30

## 2023-05-30 RX ADMIN — LIDOCAINE HYDROCHLORIDE 10 ML: 10 INJECTION, SOLUTION EPIDURAL; INFILTRATION; INTRACAUDAL at 14:11

## 2023-05-30 RX ADMIN — FENTANYL CITRATE 25 MCG: 50 INJECTION, SOLUTION INTRAMUSCULAR; INTRAVENOUS at 13:53

## 2023-05-30 RX ADMIN — LIDOCAINE HYDROCHLORIDE 15 ML: 10 INJECTION, SOLUTION EPIDURAL; INFILTRATION; INTRACAUDAL at 14:05

## 2023-05-30 RX ADMIN — MIDAZOLAM 1 MG: 1 INJECTION, SOLUTION INTRAMUSCULAR; INTRAVENOUS at 13:53

## 2023-05-30 RX ADMIN — IOHEXOL 80 ML: 350 INJECTION, SOLUTION INTRAVENOUS at 07:09

## 2023-05-30 ASSESSMENT — PAIN DESCRIPTION - PAIN TYPE: TYPE: CHRONIC PAIN

## 2023-05-30 ASSESSMENT — FIBROSIS 4 INDEX: FIB4 SCORE: 2.32

## 2023-05-30 NOTE — PROGRESS NOTES
1446: Rec'd pt from procedure room, pt to chair in , steady on feet to recliner with assist, tolerating liquids. Dressing CDI.  Ice pack placed to incision site, called pt's ride.    1450: Dr. Brown in to see patient, meets D/C criteria.    1513: Patient d/c'd to designated , placed in passenger seat.

## 2023-05-30 NOTE — OP REPORT
Patient Name: Vasile Bowers 72 y.o. male    MRN: [id number]     Date of Service: 5/30/2023    Physician/s: Brooke Brown MD    Pre-operative Diagnosis: right hip osteoarthrosis, hip pain    Post-operative Diagnosis: right hip osteoarthrosis, hip pain    Procedure: right hip acetabular Nerve RFN of the sensory femoral nerve and sensory obturator nerve with fluoroscopic guidance     Description of procedure:  The risks, benefits, and alternatives of the procedure were reviewed and discussed with the patient.  Written informed consent was freely obtained. A pre-procedural time-out was conducted by the physician verifying patient’s identity, procedure to be performed, procedure site and side, and allergy verification. Appropriate equipment was determined to be in place for the procedure.     Moderation sedation was achieved with Versed (1mg) and Fentanyl (50mcg). Monitoring of the patients vital signs and respiratory status was provided by trained independent registered nurse during the entire course of the procedures and under my supervision and recoded in the patient’s medical record. The duration of sedation was over 10 minutes.     The patient's vital signs were carefully monitored before, throughout, and after the procedure.    In the fluoroscopy suite the patient was placed in a supine position. Ultrasound was used to visualize the femoral vessels, inguinal ligament, and lower abdomen and these areas were marked.  Then the hip was then prepped in the usual sterile fashion.    The fluoroscope was placed over the hip. Using a 27-gauge 1.5 inch needle, approximately 2 mL of 1% lidocaine were injected into the dermal and epidermal layers at the anticipated entry point for the femoral and obturator nerve ablations. The needle was removed and subsequently a 25-gauge 3.5 inch needle was inserted and approximately 3 mL of 1% lidocaine was injected into the dermal and epidermal layers at the anticipated entry point for  the femoral and obturator nerve ablations.     Using fluoroscopic guidance a 17g radiofrequency introducer needle with 4mm active tip was advanced to the incisura of the acetabulum where the right articular branch of the obturator nerve traverses until a bony endpoint was met. Attempted aspiration yielded no blood. Radiographs were taken. Motor testing was then performed with and no lower extremity motor stimulation was observed. 3mL of 1% lidocaine was injected through the RF needle. A radiofrequency lesion of the articular branch of the obturator nerve was then performed at 80 degrees Celsius for 2 minutes and 30 seconds.  The needle was then adjusted slightly to a second position still targeting the right articular branch of the obturator nerve.  Motor testing was done which showed no twitching in the lower extremity.  A second radiofrequency lesion was done with cooled radiofrequency for 80 °C for 2 minutes and 30 seconds.  The needle was then withdrawn.    Using fluoroscopic guidance a 17g radiofrequency introducer needle with 4mm active tip was advanced  to the anteromedial aspect of the extraarticular portion of the right hip joint where the articular branch of the femoral nerve traverses until a bony endpoint is felt. Attempted aspiration yielded no blood. Motor testing was then performed with and no lower extremity motor stimulation was observed. 3mL of 1% lidocaine was injected through the RF needle. A radiofrequency lesion of the articular branch of the femoral nerve was then performed at 80 degrees Celsius for 2 min and 30 seconds. The needle was then adjusted slightly to a second position still targeting the right articular branch of the femoral nerve. Motor testing was done which showed no twitching in the lower extremity.  A second radiofrequency lesion was done with cooled radiofrequency for 80 °C for 2 minutes and 30 seconds. The needle was then withdrawn.      Final radiographic images were saved  for permanent storage.    The patient's hip was covered with a 4x4 gauze, the area was cleansed with sterile normal saline, and a dressing was applied.     There were no complications noted, was hemodynamically stable, and tolerated the procedure well.       Follow-up as scheduled    Brooke Brown MD  Interventional Pain and Spine  Physical Medicine and Rehabilitation  [facility]         CPT  Femoral sensory nerve, obturator sensory nerve radiofrequency neurotomy:  20926, 42382-40  fluoroscopic needle guidance 88014  moderate procedural sedation first 15 minutes: 50955  moderate procedrual sedation, additional 15 minutes: 63494

## 2023-05-30 NOTE — PROGRESS NOTES
1330: : Dr. Brown into see patient, questions answered, d/c instructions given with understanding.

## 2023-05-30 NOTE — OR SURGEON
Immediate Post OP Note    Pre-Op Diagnosis Codes:     * Lumbar spondylosis [M47.816]      Post-Op Diagnosis Codes:     * Lumbar spondylosis [M47.816]      Procedure(s):  RIGHT hip acetabular neurotomy of the femoral and obturator sensory nerves with fluoroscopic guidance with sedation. - Wound Class: Clean    Surgeon(s):  Brooke Brown M.D.    Anesthesiologist/Type of Anesthesia:  No anesthesia staff entered./Sedation    Surgical Staff:  Circulator: Shara Zuniga R.N.  Scrub Person: Vanessa Mathias  Radiology Technologist: Edith Marvin    Specimens removed if any:  * No specimens in log *    Estimated Blood Loss: None    Findings: None    Complications: None        5/30/2023 2:45 PM Brooke Brown M.D.

## 2023-05-30 NOTE — INTERVAL H&P NOTE
H&P reviewed. The patient was examined and there are no changes to the H&P    Brooke Brown MD  Interventional Pain and Spine  Physical Medicine and Rehabilitation  [facility]

## 2023-06-02 ENCOUNTER — TELEPHONE (OUTPATIENT)
Dept: PHYSICAL MEDICINE AND REHAB | Facility: MEDICAL CENTER | Age: 73
End: 2023-06-02
Payer: MEDICARE

## 2023-06-02 NOTE — TELEPHONE ENCOUNTER
Called for post-sp check-up. Pt reported the following regarding the procedure site: RIGHT hip acetabular neurotomy of the femoral and obturator sensory nerves with fluoroscopic guidance with sedation.    Change in pain?: no change yet, minor hip pain remains    Concerns?: none    Confirmed [facility] appt?: Yes, 6/30

## 2023-06-05 DIAGNOSIS — I25.10 CORONARY ARTERY DISEASE INVOLVING NATIVE CORONARY ARTERY OF NATIVE HEART WITHOUT ANGINA PECTORIS: ICD-10-CM

## 2023-06-05 RX ORDER — ATORVASTATIN CALCIUM 80 MG/1
80 TABLET, FILM COATED ORAL EVERY EVENING
Qty: 90 TABLET | Refills: 3 | Status: SHIPPED | OUTPATIENT
Start: 2023-06-05

## 2023-06-05 NOTE — TELEPHONE ENCOUNTER
Is the patient due for a refill? Yes    Was the patient seen the past year? Yes    Date of last office visit: 5/9/2023    Does the patient have an upcoming appointment?  No   If yes, When?     Provider to refill:AM    Does the patients insurance require a 100 day supply?  No

## 2023-06-29 NOTE — PROGRESS NOTES
Follow-up patient Note    Interventional Pain and Spine  Physiatry (Physical Medicine and Rehabilitation)     Patient Name: Vasile Bowers  : 1950  Date of service: 2023    Chief Complaint:   Chief Complaint   Patient presents with    Follow-Up     Right hip pain       HISTORY (3/14/2023):  Vasile Bowers is a 72 y.o. male who presents today with chronic right-sided low back pain that does not radiate down the legs.  He denies numbness, tingling, or weakness in the legs.  He attributes the pain to years of skiing, snowboarding, and surfing.  He continues to remain physically active as much as he can.  He notes that nothing has helped with his pain including epidural steroid injections or Celebrex or meloxicam or physical therapy. He denies left sided low back pain.     Pain right now is 5/10 on the numeric pain scale. His pain at its best-worse level during the course of the day is typically 2-5/10, respectively.  Pain worsens with standing and walking and improves with sitting and bending forward.  He saw Dr. Heath who felt that surgical intervention was not warranted at this time with the absence of radiating pain and instead recommended right-sided L4-5 and L5-S1 facet joint ablations.  Of note he is also seeing Adam for his right hip and has been offered a right hip replacement.  He notes hip injections have not helped in the past.      The patient has done physical therapy for this problem, most recently about 1 year ago with no relief. Consistently does stretching component of home exercise program      Patient has tried the following medications with varied success (current meds in bold):   Celebrex -no longer taking due to no relief  Meloxicam-no longer taking due to no relief  Nerve block (unknown type) -no longer taking due to no relief  Tylenol -no relief  Ibuprofen -no relief     Therapeutic modalities and interventional therapies to date include:  - epidural steroid  injection - no relief  - hip joint injection - no relief  -Of note he reports that he is anticipating receiving shoulder injections with [facility] soon     Medical history includes NSTEMI, hypertension.    HPI  Today's visit   Vasile Bowers ( 1950) is a male with Diagnoses of Right hip pain, Strain of adductor muscle of thigh, Primary osteoarthritis of right hip, Osteoarthritis of right hip, unspecified osteoarthritis type, Myalgia, Chronic right-sided low back pain without sciatica, Other chronic pain, Neuroforaminal stenosis of lumbar spine, Lumbar spondylosis, and Risk for falls were pertinent to this visit.    Presents today after 23 right hip acetabular Nerve RFN of the sensory femoral nerve and sensory obturator nerve with fluoroscopic guidance with about 10% improvement in pain. Pain is still severe, 3-8/10, located at his right posterolateral hip, worse with standing and walking. No improvement after mobic, diclofenac, tylenol. Back pain is manageable.    Has appt with Dr. Clement on .    He notes he has been offered a right hip replacement which he would like to avoid at this time.  Denies significant relief with hip joint steroid injections in the past.  Feels that maybe he received a trigger point injection in the past as well that did not help.      Pain severity 5/10 currently at rest  Pt denies new numbness, tingling, or weakness.    Procedure history:  - 3/21/23  diagnostic lumbar medial branch blocks #1 targeting right L4-L5 and L5-S1 facet joints - no improvement  - 23 right hip steroid injection - 10-20% relief of pain  - 23 Diagnostic right hip obturator and femoral nerve Blocks - 50% improvement in pain, improvement in ability to perform ADLs  - 23 right hip acetabular Nerve RFN of the sensory femoral nerve and sensory obturator nerve with fluoroscopic guidance - 10% improvement in pain         ROS:   Red Flags ROS:   Fever, Chills, Sweats:  Denies  Involuntary Weight Loss: Denies  Bladder Incontinence: Denies  Bowel Incontinence: denies  Saddle Anesthesia: Denies    All other systems reviewed and negative.     PMHx:   Past Medical History:   Diagnosis Date    Ascending aorta dilation (HCC)     CAD (coronary artery disease)     Chronic pain     High cholesterol     Myocardial infarct (HCC)        PSHx:   Past Surgical History:   Procedure Laterality Date    PB INJECT RX OTHER PERIPH NERVE Right 5/30/2023    Procedure: RIGHT hip acetabular neurotomy of the femoral and obturator sensory nerves with fluoroscopic guidance with sedation.;  Surgeon: Brooke Brown M.D.;  [facility];  Service: Pain Management    NJ INJ(S) NERVE BLOCK FEMORAL (INCLUDES IG) Right 5/2/2023    Procedure: Diagnostic RIGHT femoral and obturator nerve blocks with fluoroscopic guidance;  Surgeon: Brooke Brown M.D.;  Location[facility];  Service: Pain Management    NJ DRAIN/INJECT LARGE JOINT/BURSA Right 4/11/2023    Procedure: Diagnostic and therapeutic Fluoroscopically guided intra-articular RIGHT hip injection;  Surgeon: Brooke Brown M.D.;  Location: [facility];  Service: Pain Management    INJ,ANES LUMBAR/CERVICAL Right 3/21/2023    Procedure: Diagnostic medial branch blocks targeting the RIGHT L4-5 and L5-S1 facet joints with fluoroscopic guidance #1;  Surgeon: Brooke Brown M.D.;  Location: [facility];  Service: Pain Management    IRRIGATION & DEBRIDEMENT ORTHO Left 12/8/2021    Procedure: IRRIGATION AND DEBRIDEMENT, WOUND - HAND;  Surgeon: Guillermo Villalobos M.D.;  Location: SUR[facility]ERY [facility];  Service: Orthopedics    Z CARDIAC CATH  07/2021    PCI to OM1     KNEE ARTHROSCOPY      SHOULDER ARTHROSCOPY         Family Hx:   Family History   Problem Relation Age of Onset    Heart Disease Mother     Heart Failure Mother        Social Hx:  Social History     Socioeconomic History     Marital status:      Spouse name: Not on file    Number of children: Not on file    Years of education: Not on file    Highest education level: Not on file   Occupational History    Not on file   Tobacco Use    Smoking status: Former     Types: Cigarettes     Quit date:      Years since quittin.5    Smokeless tobacco: Former     Types: Chew     Quit date:    Vaping Use    Vaping Use: Never used   Substance and Sexual Activity    Alcohol use: Not Currently    Drug use: No    Sexual activity: Not on file   Other Topics Concern    Not on file   Social History Narrative    Not on file     Social Determinants of Health     Financial Resource Strain: Not on file   Food Insecurity: Not on file   Transportation Needs: Not on file   Physical Activity: Not on file   Stress: Not on file   Social Connections: Not on file   Intimate Partner Violence: Not on file   Housing Stability: Not on file       Allergies:  No Known Allergies    Medications: reviewed on epic.   Outpatient Medications Marked as Taking for the 23 encounter (Office Visit) with Brooke Brown M.D.   Medication Sig Dispense Refill    atorvastatin (LIPITOR) 80 MG tablet Take 1 Tablet by mouth every evening. 90 Tablet 3    diclofenac DR (VOLTAREN) 75 MG Tablet Delayed Response Take 1 Tablet by mouth 2 times a day. Discontinue other NSAIDs while taking diclofenac 60 Tablet 0    VITAMIN K PO Take 1 Tablet by mouth every day.      Glucosamine-Chondroit-Vit C-Mn (GLUCOSAMINE 1500 COMPLEX PO) Take 1 Tablet by mouth every day.      Turmeric 500 MG Cap Take 1 Capsule by mouth every day.      Cholecalciferol (VITAMIN D3) 318284 UNIT/GM Powder Take 1 Capsule by mouth every day.      aspirin EC 81 MG EC tablet Take 1 tablet by mouth every day. (Patient taking differently: Take 81 mg by mouth every evening.) 30 tablet 2    Ascorbic Acid (VITAMIN C PO) Take 1 tablet by mouth every day.      Cyanocobalamin (B-12 PO) Take 1 tablet by mouth every  "day.          Current Outpatient Medications on File Prior to Visit   Medication Sig Dispense Refill    atorvastatin (LIPITOR) 80 MG tablet Take 1 Tablet by mouth every evening. 90 Tablet 3    diclofenac DR (VOLTAREN) 75 MG Tablet Delayed Response Take 1 Tablet by mouth 2 times a day. Discontinue other NSAIDs while taking diclofenac 60 Tablet 0    VITAMIN K PO Take 1 Tablet by mouth every day.      Glucosamine-Chondroit-Vit C-Mn (GLUCOSAMINE 1500 COMPLEX PO) Take 1 Tablet by mouth every day.      Turmeric 500 MG Cap Take 1 Capsule by mouth every day.      Cholecalciferol (VITAMIN D3) 854250 UNIT/GM Powder Take 1 Capsule by mouth every day.      aspirin EC 81 MG EC tablet Take 1 tablet by mouth every day. (Patient taking differently: Take 81 mg by mouth every evening.) 30 tablet 2    Ascorbic Acid (VITAMIN C PO) Take 1 tablet by mouth every day.      Cyanocobalamin (B-12 PO) Take 1 tablet by mouth every day.       No current facility-administered medications on file prior to visit.         EXAMINATION     Physical Exam: -Limited due to video visit  /72 (BP Location: Left arm, Patient Position: Sitting, BP Cuff Size: Adult)   Pulse (!) 52   Temp 36.8 °C (98.2 °F) (Temporal)   Ht 1.727 m (5' 8\")   Wt 73 kg (160 lb 15 oz)   SpO2 98%     Constitutional:   Body Habitus: Body mass index is 24.47 kg/m².  Cooperation: Fully cooperates with exam  Appearance: Well-groomed, well-nourished.    Eyes: No scleral icterus to suggest severe liver disease, no proptosis to suggest severe hyperthyroidism    ENT -no obvious auditory deficits, no noticeable facial droop     Skin -no rashes or lesions noted     Respiratory-  breathing comfortably on room air, no audible wheezing    Cardiovascular-distal extremities warm and well perfused.  No lower extremity edema is noted.     Gastrointestinal - no obvious abdominal masses, non-distended    Psychiatric- alert and oriented ×3. Normal affect.     Gait - normal gait, no use of " ambulatory device, nonantalgic. Heel walking and toe walking intact.     Musculoskeletal and Neuro -      Thoracic/Lumbar Spine/Sacral Spine/Hips     Gait - normal gait, no use of ambulatory device, nonantalgic.      Musculoskeletal and Neuro -      Thoracic/Lumbar Spine/Sacral Spine/Hips   Inspection: No evidence of atrophy in bilateral lower extremities throughout       Palpation:   Focal tenderness to palpation over the right posterolateral glute.      Lumbar spine /hip provocative exam maneuvers  Straight leg raise negative bilaterally  FADIR test negative bilaterally     SI joint tests  EDWAR test negative bilaterally  Thigh thrust test negative bilaterally     Key points for the international standards for neurological classification of spinal cord injury (ISNCSCI) to light touch.   Dermatome R L   L2 2 2   L3 2 2   L4 2 2   L5 2 2   S1 2 2   S2 2 2         Motor Exam Lower Extremities  ? Myotome R L   Hip flexion L2 5 5   Knee extension L3 5 5   Ankle dorsiflexion L4 5 5   Toe extension L5 5 5   Ankle plantarflexion S1 5 5      Previous exam  There is full active range of motion with lumbar extension     Facet loading maneuver negative bilaterally    Reflexes  ?   R L   Patella   2+ 2+   Achilles    2+ 2+      Clonus of the ankle negative bilaterally            MEDICAL DECISION MAKING    Medical records review: see under HPI section.     DATA    Labs: No new labs available for review since last visit.   Lab Results   Component Value Date/Time    SODIUM 145 05/17/2023 08:23 AM    POTASSIUM 5.0 05/17/2023 08:23 AM    CHLORIDE 112 05/17/2023 08:23 AM    CO2 24 05/17/2023 08:23 AM    ANION 9.0 05/17/2023 08:23 AM    GLUCOSE 87 05/17/2023 08:23 AM    BUN 28 (H) 05/17/2023 08:23 AM    CREATININE 0.96 05/17/2023 08:23 AM    CALCIUM 8.7 05/17/2023 08:23 AM    ASTSGOT 27 05/17/2023 08:23 AM    ALTSGPT 28 05/17/2023 08:23 AM    TBILIRUBIN 0.5 05/17/2023 08:23 AM    ALBUMIN 3.8 05/17/2023 08:23 AM    TOTPROTEIN 6.4  05/17/2023 08:23 AM    GLOBULIN 2.6 05/17/2023 08:23 AM    AGRATIO 1.5 05/17/2023 08:23 AM       Lab Results   Component Value Date/Time    PROTHROMBTM 13.2 07/26/2021 02:59 AM    INR 1.03 07/26/2021 02:59 AM        Lab Results   Component Value Date/Time    WBC 8.7 12/09/2021 04:56 AM    RBC 4.20 (L) 12/09/2021 04:56 AM    HEMOGLOBIN 13.9 (L) 12/09/2021 04:56 AM    HEMATOCRIT 40.9 (L) 12/09/2021 04:56 AM    MCV 97.4 12/09/2021 04:56 AM    MCH 33.1 (H) 12/09/2021 04:56 AM    MCHC 34.0 12/09/2021 04:56 AM    MPV 9.7 12/09/2021 04:56 AM    NEUTSPOLYS 84.90 (H) 12/09/2021 04:56 AM    LYMPHOCYTES 8.10 (L) 12/09/2021 04:56 AM    MONOCYTES 6.60 12/09/2021 04:56 AM    EOSINOPHILS 0.00 12/09/2021 04:56 AM    BASOPHILS 0.10 12/09/2021 04:56 AM        No results found for: HBA1C     Imaging:   I personally reviewed following images, these are my reads  MRI lumbar spine 12/20/2022  Spondylolisthesis at L5-S1.  Disc space narrowing at L4-5 and L5-S1.  Disc bulge most notable at L4-5 and L5-S1.  Subacute compression fracture at superior endplate of L1.  Severe left-sided neuroforaminal stenosis at L5-S1, moderate right-sided neuroforaminal stenosis at L5-S1. Severe right-sided neuroforaminal stenosis at L4-5.  Facet arthropathy most notable at bilateral L4-5 and L5-S1. See formal radiology report for further details.     Unable to view images from XR lumbar spine 12/14/22 at the time of today's visit     IMAGING radiology reads. I reviewed the following radiology reads   MRI lumbar spine 12/20/2022         XR lumbar spine 12/14/22  AP lateral and flexion-extension views of the lumbar spine reveal severe   degenerative changes at the lumbar spine as well as what appears to be   chronic scoliosis/curvature.  These chronic findings make it difficult to   ascertain if there is an acute, nondisplaced fracture.         XR right hip 1/13/23  Multiple views of the hip including anterior-posterior and lateral views   reveal no evidence  of an obvious displaced fracture or dislocation.  We   see significant cam deformity of the femoral head.  Also joint space   narrowing, sclerosis, and osteophyte formation significant for moderate to   severe osteoarthritis of the hip.      Diagnosis  Visit Diagnoses     ICD-10-CM   1. Right hip pain  M25.551   2. Strain of adductor muscle of thigh  S76.219A   3. Primary osteoarthritis of right hip  M16.11   4. Osteoarthritis of right hip, unspecified osteoarthritis type  M16.11   5. Myalgia  M79.10   6. Chronic right-sided low back pain without sciatica  M54.50    G89.29   7. Other chronic pain  G89.29   8. Neuroforaminal stenosis of lumbar spine  M48.061   9. Lumbar spondylosis  M47.816   10. Risk for falls  Z91.81       ASSESSMENT AND PLAN:  Vasile Bowers (: 1950) is a male with history of chronic low back pain who presents with worst pain at focal posterior lateral glute that corresponds to likely myalgia in etiology, but also possibly right hip joint osteoarthritis. No significant improvement after Radiofrequency ablation of right femoral and obturator nerve blocks under fluoroscopic guidance. He has been offered a right hip replacement and would like to defer this if possible. Seeing ortho soon.     Vasile was seen today for follow-up.    Diagnoses and all orders for this visit:    Right hip pain    Strain of adductor muscle of thigh    Primary osteoarthritis of right hip    Osteoarthritis of right hip, unspecified osteoarthritis type    Myalgia    Chronic right-sided low back pain without sciatica    Other chronic pain    Neuroforaminal stenosis of lumbar spine    Lumbar spondylosis    Risk for falls  -     Patient identified as fall risk.  Appropriate orders and counseling given.                PLAN  Physical Therapy: Patient has done extensive physical therapy for this issue in the past and has been diligent with his home exercise program since.  Discussed that he should continue his home  "exercise program as able     Home Exercise Program: I previously provided the patient with a home exercise program focusing on strengthening and stretching.      Diagnostic workup: no new imaging needed at this time     Medications:   -The patient has tried several NSAIDs including Celebrex and Mobic, Tylenol, \"nerve blocker\" with no relief.   - discussed that at this time I don't think additional NSAIDs would be likely to significantly help.  - Discussed that at this time I would be hesitant to start narcotics due to chronic nature of pain. He elected to avoid medication changes at this time, pending further discussion with ortho.     Interventions:   -s/p radiofrequency ablation of right femoral and obturator nerve blocks under fluoroscopic guidance with 10% relief.   -S/p diagnostic lumbar medial branch blocks targeting right L4-5 and L5-S1 facet joints with no significant improvement.  -S/pRight hip intra-articular steroid injection under fluoroscopic guidance with 10-20% improvement in pain  -The patient reports he has received epidurals in the past with no relief at [facility] and with Dr. Live.       Other   - briefly discussed PRP as an option if ortho feels surgical intervention would not be helpful    Follow-up: as needed    Orders Placed This Encounter    Patient identified as fall risk.  Appropriate orders and counseling given.       Brooke Brown MD  Interventional Pain and Spine  Physical Medicine and Rehabilitation  [facility]      The above note documents my personal evaluation of this patient. In addition, I have reviewed and confirmed with the patient and MA the supportive information documented in today's Patient Health Questionnaire and Office Note.     Please note that this dictation was created using voice recognition software. I have made every reasonable attempt to correct obvious errors, but I expect that there are errors of grammar and possibly content that I did not discover " before finalizing the note.

## 2023-06-30 ENCOUNTER — OFFICE VISIT (OUTPATIENT)
Dept: PHYSICAL MEDICINE AND REHAB | Facility: MEDICAL CENTER | Age: 73
End: 2023-06-30
Payer: MEDICARE

## 2023-06-30 VITALS
WEIGHT: 160.94 LBS | SYSTOLIC BLOOD PRESSURE: 114 MMHG | DIASTOLIC BLOOD PRESSURE: 72 MMHG | OXYGEN SATURATION: 98 % | HEIGHT: 68 IN | BODY MASS INDEX: 24.39 KG/M2 | TEMPERATURE: 98.2 F | HEART RATE: 52 BPM

## 2023-06-30 DIAGNOSIS — M16.11 PRIMARY OSTEOARTHRITIS OF RIGHT HIP: ICD-10-CM

## 2023-06-30 DIAGNOSIS — M48.061 NEUROFORAMINAL STENOSIS OF LUMBAR SPINE: ICD-10-CM

## 2023-06-30 DIAGNOSIS — M54.50 CHRONIC RIGHT-SIDED LOW BACK PAIN WITHOUT SCIATICA: ICD-10-CM

## 2023-06-30 DIAGNOSIS — G89.29 OTHER CHRONIC PAIN: ICD-10-CM

## 2023-06-30 DIAGNOSIS — M79.10 MYALGIA: ICD-10-CM

## 2023-06-30 DIAGNOSIS — Z91.81 RISK FOR FALLS: ICD-10-CM

## 2023-06-30 DIAGNOSIS — M16.11 OSTEOARTHRITIS OF RIGHT HIP, UNSPECIFIED OSTEOARTHRITIS TYPE: ICD-10-CM

## 2023-06-30 DIAGNOSIS — M25.551 RIGHT HIP PAIN: ICD-10-CM

## 2023-06-30 DIAGNOSIS — G89.29 CHRONIC RIGHT-SIDED LOW BACK PAIN WITHOUT SCIATICA: ICD-10-CM

## 2023-06-30 DIAGNOSIS — M47.816 LUMBAR SPONDYLOSIS: ICD-10-CM

## 2023-06-30 DIAGNOSIS — S76.219A STRAIN OF ADDUCTOR MUSCLE OF THIGH: ICD-10-CM

## 2023-06-30 PROCEDURE — 3074F SYST BP LT 130 MM HG: CPT | Performed by: STUDENT IN AN ORGANIZED HEALTH CARE EDUCATION/TRAINING PROGRAM

## 2023-06-30 PROCEDURE — 1125F AMNT PAIN NOTED PAIN PRSNT: CPT | Performed by: STUDENT IN AN ORGANIZED HEALTH CARE EDUCATION/TRAINING PROGRAM

## 2023-06-30 PROCEDURE — 99214 OFFICE O/P EST MOD 30 MIN: CPT | Performed by: STUDENT IN AN ORGANIZED HEALTH CARE EDUCATION/TRAINING PROGRAM

## 2023-06-30 PROCEDURE — 3078F DIAST BP <80 MM HG: CPT | Performed by: STUDENT IN AN ORGANIZED HEALTH CARE EDUCATION/TRAINING PROGRAM

## 2023-06-30 ASSESSMENT — PAIN SCALES - GENERAL: PAINLEVEL: 5=MODERATE PAIN

## 2023-06-30 ASSESSMENT — PATIENT HEALTH QUESTIONNAIRE - PHQ9: CLINICAL INTERPRETATION OF PHQ2 SCORE: 0

## 2023-06-30 ASSESSMENT — FIBROSIS 4 INDEX: FIB4 SCORE: 2.32

## 2023-06-30 NOTE — PATIENT INSTRUCTIONS
[facility] for PRP  (at [facility] and [facility])

## 2023-07-11 ENCOUNTER — TELEPHONE (OUTPATIENT)
Dept: CARDIOLOGY | Facility: MEDICAL CENTER | Age: 73
End: 2023-07-11
Payer: MEDICARE

## 2023-07-11 PROBLEM — M25.551 CHRONIC RIGHT HIP PAIN: Status: ACTIVE | Noted: 2023-07-11

## 2023-07-11 PROBLEM — G89.29 CHRONIC RIGHT HIP PAIN: Status: ACTIVE | Noted: 2023-07-11

## 2023-07-11 PROBLEM — M16.11 PRIMARY OSTEOARTHRITIS OF RIGHT HIP: Status: ACTIVE | Noted: 2023-07-11

## 2023-07-11 NOTE — LETTER
PROCEDURE/SURGERY CLEARANCE FORM    Date: 7/12/2023   Patient Name: Vasile Bowers    Dear Surgeon or Proceduralist,      Thank you for your request for cardiac stratification of our mutual patient Vasile Bowers 1950. We have reviewed their [facility] records; and to the best of our understanding this patient has not had stenting, ablation, cardiothoracic surgery or hospitalization for cardiovascular reasons in the past 6 months.  Vasile Bowers has been seen within the past 18 months and is considered to have non-modifiable cardiac risk for this low-risk procedure/surgery. They may proceed from a cardiovascular standpoint and may hold their antiplatelet/anticoagulation as briefly as possible. Please have patient resume this medication when hemodynamically stable to do so.     Aspirin or Prasugrel   - hold 7 days prior to procedure/surgery, resume when hemodynamically stable      Clopidrogrel or Ticagrelor  - hold 7 days for all neurological procedures, hold 5 days prior to all other procedure/surgery,  resume when hemodynamically stable     Warfarin - hold 7 days for all neurological procedures, hold 5 days prior to all other procedure/surgery and coordinate with [facility] ([phone]) INR testing and dose management.      Pradaxa/Xarelto/Eliquis/Savesya - hold 1 day prior to procedure for low bleeding risk procedure, 2 days for high bleeding risk procedure, or consider holding 3 days or longer for patients with reduced kidney function (CrCl <30mL/min) or spinal/cranial surgeries/procedures.      If they have a mechanical heart valve, please coordinate with [facility] ([phone]) the proper management of their anticoagulant in the periprocedural or perioperative period.      Some patients have higher risk for cardiovascular complications or holding medication. If our patient has had prior complications of holding antiplatelet or anticoagulants in  the past and we have seen them after these events, we have addressed these concerns with the patient. They are at an unknown degree of increased risk for recurrent complication.  You may hold anticoagulation/antiplatelets for the procedure or surgery if the benefits of the procedure or surgery outweigh this nonmodifiable risk.      If Vasile Bowers 1950 has new symptoms of heart failure decompensation, unstable arrythmia, or angina please reach out and we will assess the patient.      If you have other patient-specific concerns, please feel free to reach out to the patient's cardiologist directly at [phone].     Thank you,       [facility]

## 2023-07-11 NOTE — TELEPHONE ENCOUNTER
TT    Caller: Vasile Bowers    Office Name, phone number, fax number:     **PATIENT INQUIRING**    Fax clearance to [phone]    **CLEARANCE LETTER IN LETTERS TAB**    Procedure Name: RIGHT HIP ATHROPLASTY    Procedure Scheduled Date: [facility]    Callback Number: [phone] (home)

## 2023-07-12 NOTE — TELEPHONE ENCOUNTER
Last OV: 05/09/23  Proposed Surgery: Right hip arthroplasty  Surgery Date: TBD  Requesting Office Name: [facility]  Fax Number: [phone]  Preference of Location (default is surgery center unless specified by Cardiologist or [facility])  Prior Clearance Addressed: No      Anticoags/Antiplatelets: Aspirin  Outstanding Cardiac Imaging : No  Stent, Cardiac Devices, or Catheterization: Yes  Date : 2021   Ablation, TAVR, Cardioversion: No  Recent Cardiac Hospitalization: No            When: N/A  History (cardiac history):   Past Medical History:   Diagnosis Date    Ascending aorta dilation (HCC)     CAD (coronary artery disease)     Chronic pain     High cholesterol     Myocardial infarct (HCC)              Surgical Clearance Letter Sent: YES   **Scan clearance request letter into [employer].**

## 2023-07-25 ENCOUNTER — TELEPHONE (OUTPATIENT)
Dept: MEDICAL GROUP | Facility: LAB | Age: 73
End: 2023-07-25
Payer: MEDICARE

## 2023-07-25 NOTE — TELEPHONE ENCOUNTER
Left message for patient to call back regarding pre-visit planning. Please transfer call to [phone].

## 2023-07-25 NOTE — TELEPHONE ENCOUNTER
NEW PATIENT VISIT PRE-VISIT PLANNING    1.  [employer] Patient is checked in Patient Demographics?Yes    2.  Immunizations were updated in Epic using Reconc[employer] Outside Information activity? Yes         3.  Is this appointment scheduled as a Hospital Follow-Up? No    4.  Patient is due for the following Health Maintenance Topics:   Health Maintenance Due   Topic Date Due    HEPATITIS C SCREENING  Never done    COLORECTAL CANCER SCREENING  Never done    IMM HEP B VACCINE (3 of 3 - Hep B Twinrix 3-dose series) 03/08/2004    COVID-19 Vaccine (5 - Moderna series) 07/04/2022   5.  Reviewed/Updated the following with patient:          Preferred Pharmacy? No          Preferred Lab? No          Preferred Communication? No          Allergies? No          Medications? NO  6.  Updated Care Team?          DME Company (gait device, O2, CPAP, etc.) NO          Other Specialists (eye doctor, derm, GYN, cardiology, endo, etc): NO    7.  AHA ([employer]) form printed for Provider? N/A

## 2023-08-01 ENCOUNTER — APPOINTMENT (RX ONLY)
Dept: URBAN - METROPOLITAN AREA CLINIC 6 | Facility: CLINIC | Age: 73
Setting detail: DERMATOLOGY
End: 2023-08-01

## 2023-08-01 DIAGNOSIS — L81.4 OTHER MELANIN HYPERPIGMENTATION: ICD-10-CM

## 2023-08-01 DIAGNOSIS — L57.0 ACTINIC KERATOSIS: ICD-10-CM

## 2023-08-01 DIAGNOSIS — Z85.828 PERSONAL HISTORY OF OTHER MALIGNANT NEOPLASM OF SKIN: ICD-10-CM

## 2023-08-01 DIAGNOSIS — D22 MELANOCYTIC NEVI: ICD-10-CM

## 2023-08-01 PROBLEM — D22.61 MELANOCYTIC NEVI OF RIGHT UPPER LIMB, INCLUDING SHOULDER: Status: ACTIVE | Noted: 2023-08-01

## 2023-08-01 PROBLEM — D22.71 MELANOCYTIC NEVI OF RIGHT LOWER LIMB, INCLUDING HIP: Status: ACTIVE | Noted: 2023-08-01

## 2023-08-01 PROBLEM — D22.5 MELANOCYTIC NEVI OF TRUNK: Status: ACTIVE | Noted: 2023-08-01

## 2023-08-01 PROBLEM — D22.72 MELANOCYTIC NEVI OF LEFT LOWER LIMB, INCLUDING HIP: Status: ACTIVE | Noted: 2023-08-01

## 2023-08-01 PROBLEM — D22.62 MELANOCYTIC NEVI OF LEFT UPPER LIMB, INCLUDING SHOULDER: Status: ACTIVE | Noted: 2023-08-01

## 2023-08-01 PROCEDURE — ? LIQUID NITROGEN

## 2023-08-01 PROCEDURE — 17004 DESTROY PREMAL LESIONS 15/>: CPT

## 2023-08-01 PROCEDURE — ? COUNSELING

## 2023-08-01 PROCEDURE — 99213 OFFICE O/P EST LOW 20 MIN: CPT | Mod: 25

## 2023-08-01 ASSESSMENT — LOCATION ZONE DERM
LOCATION ZONE: SCALP
LOCATION ZONE: LIP
LOCATION ZONE: TRUNK
LOCATION ZONE: ARM
LOCATION ZONE: EAR
LOCATION ZONE: LEG
LOCATION ZONE: FACE

## 2023-08-01 ASSESSMENT — LOCATION SIMPLE DESCRIPTION DERM
LOCATION SIMPLE: RIGHT LOWER BACK
LOCATION SIMPLE: LEFT CALF
LOCATION SIMPLE: LEFT SCALP
LOCATION SIMPLE: LEFT POSTERIOR THIGH
LOCATION SIMPLE: RIGHT POSTERIOR THIGH
LOCATION SIMPLE: RIGHT FOREARM
LOCATION SIMPLE: RIGHT OCCIPITAL SCALP
LOCATION SIMPLE: SCALP
LOCATION SIMPLE: RIGHT SCALP
LOCATION SIMPLE: RIGHT ZYGOMA
LOCATION SIMPLE: RIGHT CHEEK
LOCATION SIMPLE: LEFT FOREARM
LOCATION SIMPLE: SUPERIOR FOREHEAD
LOCATION SIMPLE: RIGHT EAR
LOCATION SIMPLE: RIGHT CALF
LOCATION SIMPLE: RIGHT FOREHEAD
LOCATION SIMPLE: LEFT LIP
LOCATION SIMPLE: LEFT FOREHEAD
LOCATION SIMPLE: RIGHT UPPER BACK
LOCATION SIMPLE: LEFT CHEEK

## 2023-08-01 ASSESSMENT — LOCATION DETAILED DESCRIPTION DERM
LOCATION DETAILED: RIGHT SUPERIOR PREAURICULAR CHEEK
LOCATION DETAILED: LEFT DISTAL POSTERIOR THIGH
LOCATION DETAILED: RIGHT SUPERIOR LATERAL MIDBACK
LOCATION DETAILED: RIGHT SUPERIOR FRONTAL SCALP
LOCATION DETAILED: RIGHT VENTRAL DISTAL FOREARM
LOCATION DETAILED: RIGHT SUPERIOR LATERAL BUCCAL CHEEK
LOCATION DETAILED: LEFT MEDIAL FRONTAL SCALP
LOCATION DETAILED: RIGHT SUPERIOR OCCIPITAL SCALP
LOCATION DETAILED: RIGHT FOREHEAD
LOCATION DETAILED: RIGHT DISTAL POSTERIOR THIGH
LOCATION DETAILED: RIGHT CENTRAL FRONTAL SCALP
LOCATION DETAILED: RIGHT MID-UPPER BACK
LOCATION DETAILED: RIGHT SUPERIOR HELIX
LOCATION DETAILED: RIGHT MEDIAL ZYGOMA
LOCATION DETAILED: LEFT VENTRAL DISTAL FOREARM
LOCATION DETAILED: RIGHT SUPERIOR CRUS OF ANTIHELIX
LOCATION DETAILED: SUPERIOR MID FOREHEAD
LOCATION DETAILED: LEFT PROXIMAL CALF
LOCATION DETAILED: LEFT CENTRAL MALAR CHEEK
LOCATION DETAILED: RIGHT SUPERIOR FOREHEAD
LOCATION DETAILED: LEFT PROXIMAL DORSAL FOREARM
LOCATION DETAILED: LEFT SUPERIOR MEDIAL FOREHEAD
LOCATION DETAILED: RIGHT LATERAL FOREHEAD
LOCATION DETAILED: RIGHT SUPERIOR PARIETAL SCALP
LOCATION DETAILED: RIGHT PROXIMAL DORSAL FOREARM
LOCATION DETAILED: LEFT INFERIOR CENTRAL MALAR CHEEK
LOCATION DETAILED: RIGHT PROXIMAL CALF
LOCATION DETAILED: LEFT SUPERIOR PARIETAL SCALP
LOCATION DETAILED: LEFT UPPER CUTANEOUS LIP

## 2023-08-01 NOTE — PROCEDURE: LIQUID NITROGEN
Number Of Freeze-Thaw Cycles: 3 freeze-thaw cycles
Render Post-Care Instructions In Note?: no
Post-Care Instructions: I reviewed with the patient in detail post-care instructions. Patient is to wear sunprotection, and avoid picking at any of the treated lesions. Pt may apply Vaseline to crusted or scabbing areas.
Duration Of Freeze Thaw-Cycle (Seconds): 5
Application Tool (Optional): Liquid Nitrogen Sprayer
Show Applicator Variable?: Yes
Consent: The patient's consent was obtained including but not limited to risks of crusting, scabbing, blistering, scarring, darker or lighter pigmentary change, recurrence, incomplete removal and infection.
Detail Level: Detailed

## 2023-08-10 ENCOUNTER — OFFICE VISIT (OUTPATIENT)
Dept: MEDICAL GROUP | Facility: LAB | Age: 73
End: 2023-08-10
Payer: MEDICARE

## 2023-08-10 VITALS
TEMPERATURE: 97.2 F | OXYGEN SATURATION: 96 % | HEIGHT: 68 IN | BODY MASS INDEX: 23.79 KG/M2 | SYSTOLIC BLOOD PRESSURE: 104 MMHG | DIASTOLIC BLOOD PRESSURE: 60 MMHG | RESPIRATION RATE: 16 BRPM | WEIGHT: 156.97 LBS | HEART RATE: 42 BPM

## 2023-08-10 DIAGNOSIS — Z12.5 PROSTATE CANCER SCREENING: ICD-10-CM

## 2023-08-10 DIAGNOSIS — R73.9 ELEVATED BLOOD SUGAR: ICD-10-CM

## 2023-08-10 DIAGNOSIS — R00.1 BRADYCARDIA: ICD-10-CM

## 2023-08-10 DIAGNOSIS — I25.10 CORONARY ARTERY DISEASE INVOLVING NATIVE CORONARY ARTERY OF NATIVE HEART WITHOUT ANGINA PECTORIS: ICD-10-CM

## 2023-08-10 DIAGNOSIS — G89.29 CHRONIC PAIN OF BOTH SHOULDERS: ICD-10-CM

## 2023-08-10 DIAGNOSIS — M25.511 CHRONIC PAIN OF BOTH SHOULDERS: ICD-10-CM

## 2023-08-10 DIAGNOSIS — M25.512 CHRONIC PAIN OF BOTH SHOULDERS: ICD-10-CM

## 2023-08-10 DIAGNOSIS — M16.11 PRIMARY OSTEOARTHRITIS OF RIGHT HIP: ICD-10-CM

## 2023-08-10 DIAGNOSIS — E78.5 DYSLIPIDEMIA: ICD-10-CM

## 2023-08-10 PROBLEM — R07.89 OTHER CHEST PAIN: Status: RESOLVED | Noted: 2021-07-25 | Resolved: 2023-08-10

## 2023-08-10 PROBLEM — I25.2 HISTORY OF NON-ST ELEVATION MYOCARDIAL INFARCTION (NSTEMI): Status: ACTIVE | Noted: 2021-07-25

## 2023-08-10 PROBLEM — L02.519 HAND ABSCESS: Status: RESOLVED | Noted: 2021-12-07 | Resolved: 2023-08-10

## 2023-08-10 PROBLEM — M25.551 CHRONIC RIGHT HIP PAIN: Status: RESOLVED | Noted: 2023-07-11 | Resolved: 2023-08-10

## 2023-08-10 PROBLEM — M25.551 CHRONIC RIGHT HIP PAIN: Status: ACTIVE | Noted: 2023-07-11

## 2023-08-10 PROCEDURE — 99204 OFFICE O/P NEW MOD 45 MIN: CPT | Performed by: STUDENT IN AN ORGANIZED HEALTH CARE EDUCATION/TRAINING PROGRAM

## 2023-08-10 PROCEDURE — 3074F SYST BP LT 130 MM HG: CPT | Performed by: STUDENT IN AN ORGANIZED HEALTH CARE EDUCATION/TRAINING PROGRAM

## 2023-08-10 PROCEDURE — 3078F DIAST BP <80 MM HG: CPT | Performed by: STUDENT IN AN ORGANIZED HEALTH CARE EDUCATION/TRAINING PROGRAM

## 2023-08-10 ASSESSMENT — ENCOUNTER SYMPTOMS
FEVER: 0
CHILLS: 0
SHORTNESS OF BREATH: 0

## 2023-08-10 ASSESSMENT — FIBROSIS 4 INDEX: FIB4 SCORE: 2.32

## 2023-08-10 NOTE — LETTER
[facility] [employer]  Brandon Magallanes D.O.  [address] [facility] [address]  [place] [address]  Fax: [phone]   Authorization for Release/Disclosure of   Protected Health Information   Name: VASILE ARORA : 1950 SSN: [id number]   Address: [facility] [place] [address] Phone:    [phone] (home)    I authorize the entity listed below to release/disclose the PHI below to:   [facility]/Brandon Magallanes D.O. and Brandon Magallanes D.O.   Provider or Entity Name:  [facility]    Address   City, State, Zip   Phone:      Fax:     Reason for request: continuity of care   Information to be released:    [XX  ] LAST COLONOSCOPY,  including any PATH REPORT and follow-up  [  ] LAST FIT/COLOGUARD RESULT [  ] LAST DEXA  [  ] LAST MAMMOGRAM  [  ] LAST PAP  [  ] LAST LABS [  ] RETINA EXAM REPORT  [  ] IMMUNIZATION RECORDS  [  ] Release all info      [  ] Check here and initial the line next to each item to release ALL health information INCLUDING  _____ Care and treatment for drug and / or alcohol abuse  _____ HIV testing, infection status, or AIDS  _____ Genetic Testing    DATES OF SERVICE OR TIME PERIOD TO BE DISCLOSED: _____________  I understand and acknowledge that:  * This Authorization may be revoked at any time by you in writing, except if your health information has already been used or disclosed.  * Your health information that will be used or disclosed as a result of you signing this authorization could be re-disclosed by the recipient. If this occurs, your re-disclosed health information may no longer be protected by State or Federal laws.  * You may refuse to sign this Authorization. Your refusal will not affect your ability to obtain treatment.  * This Authorization becomes effective upon signing and will  on (date) __________.      If no date is indicated, this Authorization will  one (1) year from the signature date.    Name: Vasile Arroa  Signature: Date:   8/10/2023     PLEASE  FAX REQUESTED RECORDS BACK TO: [phone]

## 2023-08-10 NOTE — PROGRESS NOTES
Subjective:     CC:  Diagnoses of Primary osteoarthritis of right hip, Bradycardia, Coronary artery disease involving native coronary artery of native heart without angina pectoris, Chronic pain of both shoulders, Elevated blood sugar, Dyslipidemia, and Prostate cancer screening were pertinent to this visit.    HISTORY OF THE PRESENT ILLNESS: Patient is a 72 y.o. male with the following medical problems   Past Medical History:   Diagnosis Date    Ascending aorta dilation (HCC)     CAD (coronary artery disease)     Chronic pain     High cholesterol     Myocardial infarct (HCC)      . This pleasant patient is here today to establish care and discuss the following;    Problem   Primary Osteoarthritis of Right Hip    Will be having a hip replacement with alexa next week. Recently started on meloxicam and percocet      Bradycardia    Very active in general     Coronary Artery Disease Involving Native Coronary Artery of Native Heart Without Angina Pectoris    Following with  To now, had a stent placed OM1     History of Non-St Elevation Myocardial Infarction (Nstemi)   Bilateral Shoulder Pain    Hx of rotator cuff tears. Has had surgery. Is following with pain management and is getting hydrocortisone injections in both shoulders      Chronic Right Hip Pain (Resolved)   Hand Abscess (Resolved)   Other Chest Pain (Resolved)   Staphylococcal Infection (Resolved)       Current Outpatient Medications Ordered in Epic   Medication Sig Dispense Refill    oxyCODONE-acetaminophen (PERCOCET) 5-325 MG Tab Take 1 Tablet by mouth every four hours as needed for Severe Pain for up to 7 days. 42 Tablet 0    meloxicam (MOBIC) 15 MG tablet Take 1 Tablet by mouth every day. 30 Tablet 1    atorvastatin (LIPITOR) 80 MG tablet Take 1 Tablet by mouth every evening. 90 Tablet 3    VITAMIN K PO Take 1 Tablet by mouth every day.      Glucosamine-Chondroit-Vit C-Mn (GLUCOSAMINE 1500 COMPLEX PO) Take 1 Tablet by mouth every day.      Turmeric 500 MG  "Cap Take 1 Capsule by mouth every day.      Cholecalciferol (VITAMIN D3) 171374 UNIT/GM Powder Take 1 Capsule by mouth every day.      aspirin EC 81 MG EC tablet Take 1 tablet by mouth every day. (Patient taking differently: Take 81 mg by mouth every evening.) 30 tablet 2    Ascorbic Acid (VITAMIN C PO) Take 1 tablet by mouth every day.      Cyanocobalamin (B-12 PO) Take 1 tablet by mouth every day.       No current [facility]-ordered facility-administered medications on file.         ROS:   Review of Systems   Constitutional:  Negative for chills and fever.   Respiratory:  Negative for shortness of breath.    Cardiovascular:  Negative for chest pain.   Musculoskeletal:  Positive for joint pain.         Objective:     Exam: /60 (BP Location: Right arm, Patient Position: Sitting, BP Cuff Size: Adult long)   Pulse (!) 42   Temp 36.2 °C (97.2 °F)   Resp 16   Ht 1.727 m (5' 8\")   Wt 71.2 kg (156 lb 15.5 oz)   SpO2 96%  Body mass index is 23.87 kg/m².    Physical Exam  Constitutional:       General: He is not in acute distress.     Appearance: He is not ill-appearing.   Pulmonary:      Effort: Pulmonary effort is normal.   Neurological:      Mental Status: He is alert.   Psychiatric:         Mood and Affect: Mood normal.         Behavior: Behavior normal.         Thought Content: Thought content normal.         Judgment: Judgment normal.               Assessment & Plan:     Problem List Items Addressed This Visit       Bilateral shoulder pain    Bradycardia    Coronary artery disease involving native coronary artery of native heart without angina pectoris     Chronic  -on atorvastatin 80mg and asa 81mg          Primary osteoarthritis of right hip     Chronic  -continue to follow with ortho          Other Visit Diagnoses       Elevated blood sugar        Relevant Orders    HEMOGLOBIN A1C    Dyslipidemia        Relevant Orders    CBC WITH DIFFERENTIAL    Comp Metabolic Panel    TSH WITH REFLEX TO FT4    Lipid Profile "    Prostate cancer screening        Relevant Orders    PROSTATE SPECIFIC AG SCREENING                Please note that this dictation was created using voice recognition software. I have made every reasonable attempt to correct obvious errors, but I expect that there are errors of grammar and possibly content that I did not discover before finalizing the note.

## 2023-09-12 ENCOUNTER — TELEPHONE (OUTPATIENT)
Dept: MEDICAL GROUP | Facility: LAB | Age: 73
End: 2023-09-12
Payer: MEDICARE

## 2023-09-12 NOTE — LETTER
9/12/2023    Vasile Bowers  [address] [place] [address]    Dear Vasile,    Your care is very important to us, and we have noticed that on 8/1//2023, you missed your appointment with Brandon Magallanes D.O. at [facility]    We’re committed to providing you with the best care possible. Your appointment time is reserved for you and your provider to discuss any current or new health concerns and, together, determine the best plan of care for you. Please call [phone] to reschedule at your earliest convenience.    In some cases, [facility] offers additional resources to make your healthcare more accessible, including transportation assistance, financial assistance and virtual visits. To learn more about these resources, please call [phone].    In order to keep you as informed as possible, below is a brief summary of our policy regarding missed appointments:  If a patient “No Shows”  three (3) or more appointments within a rolling 12-month period, they may be dismissed from the practice for failure to follow clinician recommendations.     If you have any concerns regarding the care you are receiving, please talk with your provider or call the office at [phone] and request to speak with the Practice . We’re committed to providing excellent care, and your feedback is invaluable.    Sincerely,    Brandon Magallanes D.O.

## 2023-11-18 ENCOUNTER — APPOINTMENT (OUTPATIENT)
Dept: RADIOLOGY | Facility: MEDICAL CENTER | Age: 73
End: 2023-11-18
Attending: EMERGENCY MEDICINE
Payer: MEDICARE

## 2023-11-18 ENCOUNTER — HOSPITAL ENCOUNTER (EMERGENCY)
Facility: MEDICAL CENTER | Age: 73
End: 2023-11-18
Attending: EMERGENCY MEDICINE
Payer: MEDICARE

## 2023-11-18 VITALS
RESPIRATION RATE: 16 BRPM | SYSTOLIC BLOOD PRESSURE: 117 MMHG | BODY MASS INDEX: 22.05 KG/M2 | DIASTOLIC BLOOD PRESSURE: 56 MMHG | WEIGHT: 145.5 LBS | TEMPERATURE: 99.3 F | OXYGEN SATURATION: 97 % | HEIGHT: 68 IN | HEART RATE: 48 BPM

## 2023-11-18 DIAGNOSIS — M54.10 RADICULAR PAIN: ICD-10-CM

## 2023-11-18 PROCEDURE — 700102 HCHG RX REV CODE 250 W/ 637 OVERRIDE(OP): Performed by: EMERGENCY MEDICINE

## 2023-11-18 PROCEDURE — 99284 EMERGENCY DEPT VISIT MOD MDM: CPT

## 2023-11-18 PROCEDURE — 72100 X-RAY EXAM L-S SPINE 2/3 VWS: CPT

## 2023-11-18 PROCEDURE — 73502 X-RAY EXAM HIP UNI 2-3 VIEWS: CPT | Mod: LT

## 2023-11-18 PROCEDURE — A9270 NON-COVERED ITEM OR SERVICE: HCPCS | Performed by: EMERGENCY MEDICINE

## 2023-11-18 RX ORDER — PREGABALIN 50 MG/1
50 CAPSULE ORAL 3 TIMES DAILY
Qty: 42 CAPSULE | Refills: 0 | Status: SHIPPED | OUTPATIENT
Start: 2023-11-18 | End: 2023-11-29

## 2023-11-18 RX ORDER — MELOXICAM 7.5 MG/1
7.5 TABLET ORAL DAILY
Qty: 30 TABLET | Refills: 0 | Status: SHIPPED | OUTPATIENT
Start: 2023-11-18

## 2023-11-18 RX ORDER — PREGABALIN 50 MG/1
50 CAPSULE ORAL 3 TIMES DAILY
Qty: 42 CAPSULE | Refills: 0 | Status: SHIPPED | OUTPATIENT
Start: 2023-11-18 | End: 2023-11-18 | Stop reason: SDUPTHER

## 2023-11-18 RX ORDER — PREGABALIN 25 MG/1
50 CAPSULE ORAL ONCE
Status: COMPLETED | OUTPATIENT
Start: 2023-11-18 | End: 2023-11-18

## 2023-11-18 RX ADMIN — PREGABALIN 50 MG: 25 CAPSULE ORAL at 08:49

## 2023-11-18 ASSESSMENT — FIBROSIS 4 INDEX: FIB4 SCORE: 2.36

## 2023-11-18 NOTE — ED PROVIDER NOTES
ED Provider Note    CHIEF COMPLAINT  Chief Complaint   Patient presents with    Hip Pain     Left hip pain woke him up at 0200 today. Right hip burning with hx of R hip replacement. He took oxycodone and mobic 30 minutes ago.       EXTERNAL RECORDS REVIEWED  Most recent follow-up with Dr. Racquel Castillo for right hip replacement on 10/6/2023    HPI/GORGE Burnette Alpesh Bowers is a 73 y.o. male who presents patient reports pain in their left hip.  Patient underwent total hip replacement by Dr. Clement of his right hip a few months ago.  Patient also with a history of chronic back pain for which he is planning on seeing neurosurgery for later this month.  Patient reports that he woke up this morning with significant burning pain in the left proximal lateral thigh.  He reports that pain was intense.  He had some oxycodone and Mobic leftover from his surgery in August, and took these.  He reports he did get some relief from this.  Patient denies wearing any significant tight pants or belts recently.  Patient denies any abdominal pain.  Denies any associated back pain.  denies recent trauma  denies bowel or bladder incontinence  denies ivdu. denies fevers/constitutional sxs  denies saddle paresthesias  denies focal weakness/numbness  Able to ambulate      PAST MEDICAL HISTORY   has a past medical history of Ascending aorta dilation (HCC), CAD (coronary artery disease), Chronic pain, High cholesterol, and Myocardial infarct (HCC).    SURGICAL HISTORY   has a past surgical history that includes knee arthroscopy; shoulder arthroscopy; zzz cardiac cath (07/2021); irrigation & debridement ortho (Left, 12/8/2021); inj,anes lumbar/cervical (Right, 3/21/2023); drain/inject large joint/bursa (Right, 4/11/2023); inj(s) nerve block femoral (includes ig) (Right, 5/2/2023); inject rx other periph nerve (Right, 5/30/2023); and total hip arthroplasty (Right, 8/14/2023).    FAMILY HISTORY  Family History   Problem Relation Age  "of Onset    Heart Disease Mother     Heart Failure Mother        SOCIAL HISTORY  Social History     Tobacco Use    Smoking status: Former     Current packs/day: 0.00     Types: Cigarettes     Quit date:      Years since quittin.9    Smokeless tobacco: Former     Types: Chew     Quit date:    Vaping Use    Vaping Use: Never used   Substance and Sexual Activity    Alcohol use: Not Currently    Drug use: No    Sexual activity: Not on file       CURRENT MEDICATIONS  Home Medications    **Home medications have not yet been reviewed for this encounter**         ALLERGIES  No Known Allergies    PHYSICAL EXAM  VITAL SIGNS: /72   Pulse (!) 57   Temp 37.2 °C (99 °F) (Temporal)   Resp 16   Ht 1.727 m (5' 8\")   Wt 66 kg (145 lb 8.1 oz)   SpO2 95%   BMI 22.12 kg/m²    Physical Exam  Constitutional:       Appearance: Normal appearance.   HENT:      Mouth/Throat:      Mouth: Mucous membranes are moist.   Pulmonary:      Effort: Pulmonary effort is normal.   Musculoskeletal:      Comments: Mild paraspinal tenderness of the left paraspinal musculature.  No midline tenderness of the spine.  There is some mild tenderness at the lateral proximal aspect of the thigh with some minimal paresthesias in this area.  Rest of lower extremity without any associated numbness.  There is no associated weakness.  Strength is 5 out of 5 in both distal and proximal muscle groups.  Reflexes are normal.   Neurological:      General: No focal deficit present.      Mental Status: He is alert and oriented to person, place, and time.   Psychiatric:         Mood and Affect: Mood normal.           DIAGNOSTIC STUDIES / PROCEDURES      LABS  Results for orders placed or performed during the hospital encounter of 23   Comp Metabolic Panel   Result Value Ref Range    Sodium 145 135 - 145 mmol/L    Potassium 5.0 3.6 - 5.5 mmol/L    Chloride 112 96 - 112 mmol/L    Co2 24 20 - 33 mmol/L    Anion Gap 9.0 7.0 - 16.0    Glucose 87 65 - " 99 mg/dL    Bun 28 (H) 8 - 22 mg/dL    Creatinine 0.96 0.50 - 1.40 mg/dL    Calcium 8.7 8.5 - 10.5 mg/dL    AST(SGOT) 27 12 - 45 U/L    ALT(SGPT) 28 2 - 50 U/L    Alkaline Phosphatase 118 (H) 30 - 99 U/L    Total Bilirubin 0.5 0.1 - 1.5 mg/dL    Albumin 3.8 3.2 - 4.9 g/dL    Total Protein 6.4 6.0 - 8.2 g/dL    Globulin 2.6 1.9 - 3.5 g/dL    A-G Ratio 1.5 g/dL   Lipid Profile   Result Value Ref Range    Cholesterol,Tot 115 100 - 199 mg/dL    Triglycerides 36 0 - 149 mg/dL    HDL 60 >=40 mg/dL    LDL 48 <100 mg/dL   FASTING STATUS   Result Value Ref Range    Fasting Status Fasting    CORRECTED CALCIUM   Result Value Ref Range    Correct Calcium 8.9 8.5 - 10.5 mg/dL   ESTIMATED GFR   Result Value Ref Range    GFR (CKD-EPI) 84 >60 mL/min/1.73 m 2         RADIOLOGY  I have independently interpreted the diagnostic imaging associated with this visit and am waiting the final reading from the radiologist.   My preliminary interpretation is as follows: Chronic changes of the spine, no evidence of acute fracture  Radiologist interpretation:   DX-LUMBAR SPINE-2 OR 3 VIEWS   Final Result         1. No acute abnormality.   2. Severe degenerative spondylosis at L4-S1.   3. 8 mm anterolisthesis at L5-S1.   3. Moderate digital spondylosis at L2-3.      DX-HIP-COMPLETE - UNILATERAL 2+ LEFT   Final Result      No acute process.            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 Neurologic exam is nl and pt is ambulatory. Highly unlikely epidural abscess in this patient w/o fevers or midline point tenderness. Pt without any recent LP, there is no recent trauma, pt with no coagulopathy, epidural hematoma highly unlikely  checking XR.  Emergent MRI is not currently indicated, pt is in understanding to return if they develop weakness/numbness, saddle  paresthesias, severe worsening of pain, bowel or bladder incontinence, fever, inability to ambulate or they have any other concerns.  X-rays unremarkable.  Patient ambulatory.  Patient was given a dose of Lyrica with some improvement in symptoms.  Hold Lyrica.  Follow-up primary care.  Return precautions reviewed.          DISPOSITION AND DISCUSSIONS      Escalation of care considered, and ultimately not performed: MRI deferred, see above for reasoning        FINAL DIAGNOSIS  1. Radicular pain  meloxicam (MOBIC) 7.5 MG Tab    pregabalin (LYRICA) 50 MG capsule    DISCONTINUED: pregabalin (LYRICA) 50 MG capsule

## 2023-11-18 NOTE — DISCHARGE INSTRUCTIONS
Your x-ray shows significant stenosis of your spine.  If you develop incontinence, numbness around your groin, fever, are unable to walk or have any other concerns return to the emergency department.  Follow-up with your neurosurgeon.  Your symptoms may also be from something called meralgia paresthetica, wear loosefitting pants to help resolve this.  I have prescribed you some Mobic and Lyrica for your pain.

## 2023-11-18 NOTE — ED NOTES
Discharged to home with instructions to follow up with his doctor. Prescriptions sent to pharmacy. Patient has ride home.

## 2023-11-29 ENCOUNTER — APPOINTMENT (OUTPATIENT)
Dept: RADIOLOGY | Facility: MEDICAL CENTER | Age: 73
DRG: 522 | End: 2023-11-29
Attending: EMERGENCY MEDICINE
Payer: MEDICARE

## 2023-11-29 ENCOUNTER — HOSPITAL ENCOUNTER (INPATIENT)
Facility: MEDICAL CENTER | Age: 73
LOS: 5 days | DRG: 522 | End: 2023-12-04
Attending: EMERGENCY MEDICINE | Admitting: HOSPITALIST
Payer: MEDICARE
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
## 2023-11-29 DIAGNOSIS — M97.01XA PERIPROSTHETIC FRACTURE AROUND INTERNAL PROSTHETIC RIGHT HIP JOINT, INITIAL ENCOUNTER (HCC): ICD-10-CM

## 2023-11-29 DIAGNOSIS — M97.8XXA PERIPROSTHETIC FRACTURE OF HIP, INITIAL ENCOUNTER: ICD-10-CM

## 2023-11-29 DIAGNOSIS — Z96.649 PERIPROSTHETIC FRACTURE OF HIP, INITIAL ENCOUNTER: ICD-10-CM

## 2023-11-29 PROBLEM — E78.5 HYPERLIPIDEMIA: Status: ACTIVE | Noted: 2023-11-29

## 2023-11-29 PROBLEM — D62 ACUTE BLOOD LOSS ANEMIA: Status: ACTIVE | Noted: 2023-11-29

## 2023-11-29 PROBLEM — S70.01XA: Status: ACTIVE | Noted: 2023-11-29

## 2023-11-29 PROBLEM — Z01.810 PREOPERATIVE CARDIOVASCULAR EXAMINATION: Status: ACTIVE | Noted: 2023-11-29

## 2023-11-29 LAB
ALBUMIN SERPL BCP-MCNC: 3.8 G/DL (ref 3.2–4.9)
ALBUMIN/GLOB SERPL: 1.8 G/DL
ALP SERPL-CCNC: 78 U/L (ref 30–99)
ALT SERPL-CCNC: 26 U/L (ref 2–50)
ANION GAP SERPL CALC-SCNC: 11 MMOL/L (ref 7–16)
AST SERPL-CCNC: 28 U/L (ref 12–45)
BASOPHILS # BLD AUTO: 0.3 % (ref 0–1.8)
BASOPHILS # BLD: 0.02 K/UL (ref 0–0.12)
BILIRUB SERPL-MCNC: 0.7 MG/DL (ref 0.1–1.5)
BUN SERPL-MCNC: 22 MG/DL (ref 8–22)
CALCIUM ALBUM COR SERPL-MCNC: 8.7 MG/DL (ref 8.5–10.5)
CALCIUM SERPL-MCNC: 8.5 MG/DL (ref 8.4–10.2)
CHLORIDE SERPL-SCNC: 103 MMOL/L (ref 96–112)
CK SERPL-CCNC: 126 U/L (ref 0–154)
CO2 SERPL-SCNC: 21 MMOL/L (ref 20–33)
CREAT SERPL-MCNC: 0.73 MG/DL (ref 0.5–1.4)
EOSINOPHIL # BLD AUTO: 0.01 K/UL (ref 0–0.51)
EOSINOPHIL NFR BLD: 0.1 % (ref 0–6.9)
ERYTHROCYTE [DISTWIDTH] IN BLOOD BY AUTOMATED COUNT: 42.7 FL (ref 35.9–50)
GFR SERPLBLD CREATININE-BSD FMLA CKD-EPI: 97 ML/MIN/1.73 M 2
GLOBULIN SER CALC-MCNC: 2.1 G/DL (ref 1.9–3.5)
GLUCOSE SERPL-MCNC: 89 MG/DL (ref 65–99)
HCT VFR BLD AUTO: 33.9 % (ref 42–52)
HGB BLD-MCNC: 12.1 G/DL (ref 14–18)
IMM GRANULOCYTES # BLD AUTO: 0.01 K/UL (ref 0–0.11)
IMM GRANULOCYTES NFR BLD AUTO: 0.1 % (ref 0–0.9)
LYMPHOCYTES # BLD AUTO: 0.86 K/UL (ref 1–4.8)
LYMPHOCYTES NFR BLD: 10.8 % (ref 22–41)
MCH RBC QN AUTO: 33.2 PG (ref 27–33)
MCHC RBC AUTO-ENTMCNC: 35.7 G/DL (ref 32.3–36.5)
MCV RBC AUTO: 92.9 FL (ref 81.4–97.8)
MONOCYTES # BLD AUTO: 0.81 K/UL (ref 0–0.85)
MONOCYTES NFR BLD AUTO: 10.2 % (ref 0–13.4)
NEUTROPHILS # BLD AUTO: 6.24 K/UL (ref 1.82–7.42)
NEUTROPHILS NFR BLD: 78.5 % (ref 44–72)
NRBC # BLD AUTO: 0 K/UL
NRBC BLD-RTO: 0 /100 WBC (ref 0–0.2)
PLATELET # BLD AUTO: 141 K/UL (ref 164–446)
PMV BLD AUTO: 9.7 FL (ref 9–12.9)
POTASSIUM SERPL-SCNC: 3.6 MMOL/L (ref 3.6–5.5)
PROT SERPL-MCNC: 5.9 G/DL (ref 6–8.2)
RBC # BLD AUTO: 3.65 M/UL (ref 4.7–6.1)
SODIUM SERPL-SCNC: 135 MMOL/L (ref 135–145)
WBC # BLD AUTO: 8 K/UL (ref 4.8–10.8)

## 2023-11-29 PROCEDURE — 99223 1ST HOSP IP/OBS HIGH 75: CPT | Mod: AI | Performed by: HOSPITALIST

## 2023-11-29 PROCEDURE — 72192 CT PELVIS W/O DYE: CPT

## 2023-11-29 PROCEDURE — 82550 ASSAY OF CK (CPK): CPT

## 2023-11-29 PROCEDURE — 700102 HCHG RX REV CODE 250 W/ 637 OVERRIDE(OP): Performed by: EMERGENCY MEDICINE

## 2023-11-29 PROCEDURE — 80053 COMPREHEN METABOLIC PANEL: CPT

## 2023-11-29 PROCEDURE — 700111 HCHG RX REV CODE 636 W/ 250 OVERRIDE (IP): Mod: JZ | Performed by: HOSPITALIST

## 2023-11-29 PROCEDURE — 85025 COMPLETE CBC W/AUTO DIFF WBC: CPT

## 2023-11-29 PROCEDURE — 36415 COLL VENOUS BLD VENIPUNCTURE: CPT

## 2023-11-29 PROCEDURE — 96374 THER/PROPH/DIAG INJ IV PUSH: CPT

## 2023-11-29 PROCEDURE — A9270 NON-COVERED ITEM OR SERVICE: HCPCS | Performed by: EMERGENCY MEDICINE

## 2023-11-29 PROCEDURE — 99285 EMERGENCY DEPT VISIT HI MDM: CPT

## 2023-11-29 PROCEDURE — 770006 HCHG ROOM/CARE - MED/SURG/GYN SEMI*

## 2023-11-29 RX ORDER — AMOXICILLIN 250 MG
2 CAPSULE ORAL 2 TIMES DAILY
Status: DISCONTINUED | OUTPATIENT
Start: 2023-11-29 | End: 2023-11-30

## 2023-11-29 RX ORDER — BISACODYL 10 MG
10 SUPPOSITORY, RECTAL RECTAL
Status: DISCONTINUED | OUTPATIENT
Start: 2023-11-29 | End: 2023-11-30

## 2023-11-29 RX ORDER — HYDROMORPHONE HYDROCHLORIDE 1 MG/ML
1 INJECTION, SOLUTION INTRAMUSCULAR; INTRAVENOUS; SUBCUTANEOUS
Status: DISCONTINUED | OUTPATIENT
Start: 2023-11-29 | End: 2023-11-30

## 2023-11-29 RX ORDER — ONDANSETRON 2 MG/ML
4 INJECTION INTRAMUSCULAR; INTRAVENOUS EVERY 4 HOURS PRN
Status: DISCONTINUED | OUTPATIENT
Start: 2023-11-29 | End: 2023-12-04 | Stop reason: HOSPADM

## 2023-11-29 RX ORDER — ONDANSETRON 4 MG/1
4 TABLET, ORALLY DISINTEGRATING ORAL EVERY 4 HOURS PRN
Status: DISCONTINUED | OUTPATIENT
Start: 2023-11-29 | End: 2023-12-04 | Stop reason: HOSPADM

## 2023-11-29 RX ORDER — OXYCODONE HYDROCHLORIDE 5 MG/1
5 TABLET ORAL ONCE
Status: COMPLETED | OUTPATIENT
Start: 2023-11-29 | End: 2023-11-29

## 2023-11-29 RX ORDER — OXYCODONE HYDROCHLORIDE AND ACETAMINOPHEN 5; 325 MG/1; MG/1
1 TABLET ORAL ONCE
Status: COMPLETED | OUTPATIENT
Start: 2023-11-29 | End: 2023-11-29

## 2023-11-29 RX ORDER — POLYETHYLENE GLYCOL 3350 17 G/17G
1 POWDER, FOR SOLUTION ORAL
Status: DISCONTINUED | OUTPATIENT
Start: 2023-11-29 | End: 2023-11-30

## 2023-11-29 RX ORDER — ACETAMINOPHEN 325 MG/1
650 TABLET ORAL EVERY 6 HOURS PRN
Status: DISCONTINUED | OUTPATIENT
Start: 2023-11-29 | End: 2023-12-04 | Stop reason: HOSPADM

## 2023-11-29 RX ORDER — HYDROMORPHONE HYDROCHLORIDE 1 MG/ML
0.5 INJECTION, SOLUTION INTRAMUSCULAR; INTRAVENOUS; SUBCUTANEOUS
Status: DISCONTINUED | OUTPATIENT
Start: 2023-11-29 | End: 2023-11-30

## 2023-11-29 RX ADMIN — HYDROMORPHONE HYDROCHLORIDE 0.5 MG: 1 INJECTION, SOLUTION INTRAMUSCULAR; INTRAVENOUS; SUBCUTANEOUS at 23:12

## 2023-11-29 RX ADMIN — OXYCODONE AND ACETAMINOPHEN 1 TABLET: 5; 325 TABLET ORAL at 18:57

## 2023-11-29 RX ADMIN — OXYCODONE 5 MG: 5 TABLET ORAL at 19:56

## 2023-11-29 ASSESSMENT — ENCOUNTER SYMPTOMS
BRUISES/BLEEDS EASILY: 0
BLURRED VISION: 0
PALPITATIONS: 0
HEADACHES: 0
SORE THROAT: 0
COUGH: 0
WEAKNESS: 0
FALLS: 1
DOUBLE VISION: 0
MYALGIAS: 0
DIZZINESS: 0
FEVER: 0
DEPRESSION: 0
NECK PAIN: 0
VOMITING: 0
NAUSEA: 0
INSOMNIA: 0
SHORTNESS OF BREATH: 0

## 2023-11-29 ASSESSMENT — FIBROSIS 4 INDEX: FIB4 SCORE: 2.36

## 2023-11-29 ASSESSMENT — PAIN DESCRIPTION - PAIN TYPE: TYPE: ACUTE PAIN

## 2023-11-30 ENCOUNTER — ANESTHESIA EVENT (OUTPATIENT)
Dept: SURGERY | Facility: MEDICAL CENTER | Age: 73
DRG: 522 | End: 2023-11-30
Payer: MEDICARE

## 2023-11-30 ENCOUNTER — APPOINTMENT (OUTPATIENT)
Dept: RADIOLOGY | Facility: MEDICAL CENTER | Age: 73
DRG: 522 | End: 2023-11-30
Attending: ORTHOPAEDIC SURGERY
Payer: MEDICARE

## 2023-11-30 ENCOUNTER — ANESTHESIA (OUTPATIENT)
Dept: SURGERY | Facility: MEDICAL CENTER | Age: 73
DRG: 522 | End: 2023-11-30
Payer: MEDICARE

## 2023-11-30 ENCOUNTER — APPOINTMENT (OUTPATIENT)
Dept: RADIOLOGY | Facility: MEDICAL CENTER | Age: 73
DRG: 522 | End: 2023-11-30
Attending: SURGERY
Payer: MEDICARE

## 2023-11-30 LAB
ANION GAP SERPL CALC-SCNC: 10 MMOL/L (ref 7–16)
BUN SERPL-MCNC: 21 MG/DL (ref 8–22)
CALCIUM SERPL-MCNC: 8.4 MG/DL (ref 8.4–10.2)
CHLORIDE SERPL-SCNC: 103 MMOL/L (ref 96–112)
CK SERPL-CCNC: 116 U/L (ref 0–154)
CO2 SERPL-SCNC: 22 MMOL/L (ref 20–33)
CREAT SERPL-MCNC: 0.72 MG/DL (ref 0.5–1.4)
ERYTHROCYTE [DISTWIDTH] IN BLOOD BY AUTOMATED COUNT: 43.4 FL (ref 35.9–50)
GFR SERPLBLD CREATININE-BSD FMLA CKD-EPI: 97 ML/MIN/1.73 M 2
GLUCOSE SERPL-MCNC: 91 MG/DL (ref 65–99)
HCT VFR BLD AUTO: 30.2 % (ref 42–52)
HCT VFR BLD AUTO: 32.6 % (ref 42–52)
HCT VFR BLD AUTO: 33 % (ref 42–52)
HCT VFR BLD AUTO: 34.3 % (ref 42–52)
HGB BLD-MCNC: 10.6 G/DL (ref 14–18)
HGB BLD-MCNC: 11.6 G/DL (ref 14–18)
HGB BLD-MCNC: 11.6 G/DL (ref 14–18)
HGB BLD-MCNC: 11.8 G/DL (ref 14–18)
LACTATE SERPL-SCNC: 0.8 MMOL/L (ref 0.5–2)
LACTATE SERPL-SCNC: 0.9 MMOL/L (ref 0.5–2)
LACTATE SERPL-SCNC: 0.9 MMOL/L (ref 0.5–2)
MCH RBC QN AUTO: 32.9 PG (ref 27–33)
MCHC RBC AUTO-ENTMCNC: 34.4 G/DL (ref 32.3–36.5)
MCV RBC AUTO: 95.5 FL (ref 81.4–97.8)
PLATELET # BLD AUTO: 136 K/UL (ref 164–446)
PMV BLD AUTO: 9.8 FL (ref 9–12.9)
POTASSIUM SERPL-SCNC: 3.8 MMOL/L (ref 3.6–5.5)
RBC # BLD AUTO: 3.59 M/UL (ref 4.7–6.1)
SODIUM SERPL-SCNC: 135 MMOL/L (ref 135–145)
WBC # BLD AUTO: 6.2 K/UL (ref 4.8–10.8)

## 2023-11-30 PROCEDURE — C1713 ANCHOR/SCREW BN/BN,TIS/BN: HCPCS | Performed by: ORTHOPAEDIC SURGERY

## 2023-11-30 PROCEDURE — 72170 X-RAY EXAM OF PELVIS: CPT

## 2023-11-30 PROCEDURE — 700111 HCHG RX REV CODE 636 W/ 250 OVERRIDE (IP): Mod: JZ | Performed by: ANESTHESIOLOGY

## 2023-11-30 PROCEDURE — 700102 HCHG RX REV CODE 250 W/ 637 OVERRIDE(OP): Performed by: ORTHOPAEDIC SURGERY

## 2023-11-30 PROCEDURE — 82550 ASSAY OF CK (CPK): CPT

## 2023-11-30 PROCEDURE — 700111 HCHG RX REV CODE 636 W/ 250 OVERRIDE (IP): Mod: JZ | Performed by: ORTHOPAEDIC SURGERY

## 2023-11-30 PROCEDURE — 700101 HCHG RX REV CODE 250: Performed by: ANESTHESIOLOGY

## 2023-11-30 PROCEDURE — 85014 HEMATOCRIT: CPT | Mod: 91

## 2023-11-30 PROCEDURE — 80048 BASIC METABOLIC PNL TOTAL CA: CPT

## 2023-11-30 PROCEDURE — 160031 HCHG SURGERY MINUTES - 1ST 30 MINS LEVEL 5: Performed by: ORTHOPAEDIC SURGERY

## 2023-11-30 PROCEDURE — 99222 1ST HOSP IP/OBS MODERATE 55: CPT | Mod: 57 | Performed by: SURGERY

## 2023-11-30 PROCEDURE — 160035 HCHG PACU - 1ST 60 MINS PHASE I: Performed by: ORTHOPAEDIC SURGERY

## 2023-11-30 PROCEDURE — A9270 NON-COVERED ITEM OR SERVICE: HCPCS | Performed by: ANESTHESIOLOGY

## 2023-11-30 PROCEDURE — 700111 HCHG RX REV CODE 636 W/ 250 OVERRIDE (IP): Mod: JZ | Performed by: HOSPITALIST

## 2023-11-30 PROCEDURE — C1776 JOINT DEVICE (IMPLANTABLE): HCPCS | Performed by: ORTHOPAEDIC SURGERY

## 2023-11-30 PROCEDURE — 160036 HCHG PACU - EA ADDL 30 MINS PHASE I: Performed by: ORTHOPAEDIC SURGERY

## 2023-11-30 PROCEDURE — 700101 HCHG RX REV CODE 250: Performed by: ORTHOPAEDIC SURGERY

## 2023-11-30 PROCEDURE — 85027 COMPLETE CBC AUTOMATED: CPT

## 2023-11-30 PROCEDURE — 700105 HCHG RX REV CODE 258: Performed by: ANESTHESIOLOGY

## 2023-11-30 PROCEDURE — 83605 ASSAY OF LACTIC ACID: CPT

## 2023-11-30 PROCEDURE — 36415 COLL VENOUS BLD VENIPUNCTURE: CPT

## 2023-11-30 PROCEDURE — 770006 HCHG ROOM/CARE - MED/SURG/GYN SEMI*

## 2023-11-30 PROCEDURE — 0SRR0JZ REPLACEMENT OF RIGHT HIP JOINT, FEMORAL SURFACE WITH SYNTHETIC SUBSTITUTE, OPEN APPROACH: ICD-10-PCS | Performed by: ORTHOPAEDIC SURGERY

## 2023-11-30 PROCEDURE — 0QS604Z REPOSITION RIGHT UPPER FEMUR WITH INTERNAL FIXATION DEVICE, OPEN APPROACH: ICD-10-PCS | Performed by: ORTHOPAEDIC SURGERY

## 2023-11-30 PROCEDURE — 94760 N-INVAS EAR/PLS OXIMETRY 1: CPT

## 2023-11-30 PROCEDURE — 160002 HCHG RECOVERY MINUTES (STAT): Performed by: ORTHOPAEDIC SURGERY

## 2023-11-30 PROCEDURE — 160042 HCHG SURGERY MINUTES - EA ADDL 1 MIN LEVEL 5: Performed by: ORTHOPAEDIC SURGERY

## 2023-11-30 PROCEDURE — 700102 HCHG RX REV CODE 250 W/ 637 OVERRIDE(OP): Performed by: ANESTHESIOLOGY

## 2023-11-30 PROCEDURE — 160048 HCHG OR STATISTICAL LEVEL 1-5: Performed by: ORTHOPAEDIC SURGERY

## 2023-11-30 PROCEDURE — 99232 SBSQ HOSP IP/OBS MODERATE 35: CPT | Performed by: INTERNAL MEDICINE

## 2023-11-30 PROCEDURE — 85018 HEMOGLOBIN: CPT

## 2023-11-30 PROCEDURE — 502000 HCHG MISC OR IMPLANTS RC 0278: Performed by: ORTHOPAEDIC SURGERY

## 2023-11-30 PROCEDURE — A9270 NON-COVERED ITEM OR SERVICE: HCPCS | Performed by: ORTHOPAEDIC SURGERY

## 2023-11-30 PROCEDURE — 160009 HCHG ANES TIME/MIN: Performed by: ORTHOPAEDIC SURGERY

## 2023-11-30 DEVICE — HIP DALL MILES SET 2.0 SLEEVE: Type: IMPLANTABLE DEVICE | Site: HIP | Status: FUNCTIONAL

## 2023-11-30 DEVICE — IMPLANTABLE DEVICE: Type: IMPLANTABLE DEVICE | Site: HIP | Status: FUNCTIONAL

## 2023-11-30 RX ORDER — ONDANSETRON 2 MG/ML
4 INJECTION INTRAMUSCULAR; INTRAVENOUS EVERY 4 HOURS PRN
Status: DISCONTINUED | OUTPATIENT
Start: 2023-11-30 | End: 2023-11-30

## 2023-11-30 RX ORDER — PHENYLEPHRINE HYDROCHLORIDE 10 MG/ML
INJECTION, SOLUTION INTRAMUSCULAR; INTRAVENOUS; SUBCUTANEOUS PRN
Status: DISCONTINUED | OUTPATIENT
Start: 2023-11-30 | End: 2023-11-30 | Stop reason: SURG

## 2023-11-30 RX ORDER — HYDROMORPHONE HYDROCHLORIDE 1 MG/ML
0.2 INJECTION, SOLUTION INTRAMUSCULAR; INTRAVENOUS; SUBCUTANEOUS
Status: DISCONTINUED | OUTPATIENT
Start: 2023-11-30 | End: 2023-11-30 | Stop reason: HOSPADM

## 2023-11-30 RX ORDER — EPHEDRINE SULFATE 50 MG/ML
5 INJECTION, SOLUTION INTRAVENOUS
Status: DISCONTINUED | OUTPATIENT
Start: 2023-11-30 | End: 2023-11-30 | Stop reason: HOSPADM

## 2023-11-30 RX ORDER — DEXAMETHASONE SODIUM PHOSPHATE 4 MG/ML
4 INJECTION, SOLUTION INTRA-ARTICULAR; INTRALESIONAL; INTRAMUSCULAR; INTRAVENOUS; SOFT TISSUE
Status: DISCONTINUED | OUTPATIENT
Start: 2023-11-30 | End: 2023-12-04 | Stop reason: HOSPADM

## 2023-11-30 RX ORDER — VANCOMYCIN HYDROCHLORIDE 1 G/20ML
INJECTION, POWDER, LYOPHILIZED, FOR SOLUTION INTRAVENOUS
Status: COMPLETED | OUTPATIENT
Start: 2023-11-30 | End: 2023-11-30

## 2023-11-30 RX ORDER — HYDROMORPHONE HYDROCHLORIDE 1 MG/ML
0.4 INJECTION, SOLUTION INTRAMUSCULAR; INTRAVENOUS; SUBCUTANEOUS
Status: DISCONTINUED | OUTPATIENT
Start: 2023-11-30 | End: 2023-11-30 | Stop reason: HOSPADM

## 2023-11-30 RX ORDER — AMOXICILLIN 250 MG
1 CAPSULE ORAL NIGHTLY
Status: DISCONTINUED | OUTPATIENT
Start: 2023-11-30 | End: 2023-12-04 | Stop reason: HOSPADM

## 2023-11-30 RX ORDER — ONDANSETRON 2 MG/ML
INJECTION INTRAMUSCULAR; INTRAVENOUS PRN
Status: DISCONTINUED | OUTPATIENT
Start: 2023-11-30 | End: 2023-11-30 | Stop reason: SURG

## 2023-11-30 RX ORDER — SODIUM CHLORIDE, SODIUM LACTATE, POTASSIUM CHLORIDE, CALCIUM CHLORIDE 600; 310; 30; 20 MG/100ML; MG/100ML; MG/100ML; MG/100ML
INJECTION, SOLUTION INTRAVENOUS
Status: DISCONTINUED | OUTPATIENT
Start: 2023-11-30 | End: 2023-11-30 | Stop reason: SURG

## 2023-11-30 RX ORDER — BISACODYL 10 MG
10 SUPPOSITORY, RECTAL RECTAL
Status: DISCONTINUED | OUTPATIENT
Start: 2023-11-30 | End: 2023-12-04 | Stop reason: HOSPADM

## 2023-11-30 RX ORDER — AMOXICILLIN 250 MG
1 CAPSULE ORAL
Status: DISCONTINUED | OUTPATIENT
Start: 2023-11-30 | End: 2023-12-04 | Stop reason: HOSPADM

## 2023-11-30 RX ORDER — OXYCODONE HCL 5 MG/5 ML
10 SOLUTION, ORAL ORAL
Status: COMPLETED | OUTPATIENT
Start: 2023-11-30 | End: 2023-11-30

## 2023-11-30 RX ORDER — DOCUSATE SODIUM 100 MG/1
100 CAPSULE, LIQUID FILLED ORAL 2 TIMES DAILY
Status: DISCONTINUED | OUTPATIENT
Start: 2023-11-30 | End: 2023-12-04 | Stop reason: HOSPADM

## 2023-11-30 RX ORDER — HALOPERIDOL 5 MG/ML
1 INJECTION INTRAMUSCULAR
Status: DISCONTINUED | OUTPATIENT
Start: 2023-11-30 | End: 2023-11-30 | Stop reason: HOSPADM

## 2023-11-30 RX ORDER — POLYETHYLENE GLYCOL 3350 17 G/17G
1 POWDER, FOR SOLUTION ORAL 2 TIMES DAILY PRN
Status: DISCONTINUED | OUTPATIENT
Start: 2023-11-30 | End: 2023-12-04 | Stop reason: HOSPADM

## 2023-11-30 RX ORDER — HYDROMORPHONE HYDROCHLORIDE 1 MG/ML
0.5 INJECTION, SOLUTION INTRAMUSCULAR; INTRAVENOUS; SUBCUTANEOUS
Status: DISCONTINUED | OUTPATIENT
Start: 2023-11-30 | End: 2023-12-04 | Stop reason: HOSPADM

## 2023-11-30 RX ORDER — DIPHENHYDRAMINE HYDROCHLORIDE 50 MG/ML
25 INJECTION INTRAMUSCULAR; INTRAVENOUS EVERY 6 HOURS PRN
Status: DISCONTINUED | OUTPATIENT
Start: 2023-11-30 | End: 2023-12-04 | Stop reason: HOSPADM

## 2023-11-30 RX ORDER — KETOROLAC TROMETHAMINE 30 MG/ML
INJECTION, SOLUTION INTRAMUSCULAR; INTRAVENOUS PRN
Status: DISCONTINUED | OUTPATIENT
Start: 2023-11-30 | End: 2023-11-30 | Stop reason: SURG

## 2023-11-30 RX ORDER — METOPROLOL TARTRATE 1 MG/ML
1 INJECTION, SOLUTION INTRAVENOUS
Status: DISCONTINUED | OUTPATIENT
Start: 2023-11-30 | End: 2023-11-30 | Stop reason: HOSPADM

## 2023-11-30 RX ORDER — TRANEXAMIC ACID 100 MG/ML
INJECTION, SOLUTION INTRAVENOUS PRN
Status: DISCONTINUED | OUTPATIENT
Start: 2023-11-30 | End: 2023-11-30 | Stop reason: SURG

## 2023-11-30 RX ORDER — HYDROMORPHONE HYDROCHLORIDE 1 MG/ML
0.1 INJECTION, SOLUTION INTRAMUSCULAR; INTRAVENOUS; SUBCUTANEOUS
Status: DISCONTINUED | OUTPATIENT
Start: 2023-11-30 | End: 2023-11-30 | Stop reason: HOSPADM

## 2023-11-30 RX ORDER — SODIUM CHLORIDE, SODIUM LACTATE, POTASSIUM CHLORIDE, CALCIUM CHLORIDE 600; 310; 30; 20 MG/100ML; MG/100ML; MG/100ML; MG/100ML
INJECTION, SOLUTION INTRAVENOUS CONTINUOUS
Status: DISCONTINUED | OUTPATIENT
Start: 2023-11-30 | End: 2023-11-30 | Stop reason: HOSPADM

## 2023-11-30 RX ORDER — LIDOCAINE HYDROCHLORIDE 20 MG/ML
INJECTION, SOLUTION EPIDURAL; INFILTRATION; INTRACAUDAL; PERINEURAL PRN
Status: DISCONTINUED | OUTPATIENT
Start: 2023-11-30 | End: 2023-11-30 | Stop reason: SURG

## 2023-11-30 RX ORDER — EPHEDRINE SULFATE 50 MG/ML
INJECTION, SOLUTION INTRAVENOUS PRN
Status: DISCONTINUED | OUTPATIENT
Start: 2023-11-30 | End: 2023-11-30 | Stop reason: SURG

## 2023-11-30 RX ORDER — ROCURONIUM BROMIDE 10 MG/ML
INJECTION, SOLUTION INTRAVENOUS PRN
Status: DISCONTINUED | OUTPATIENT
Start: 2023-11-30 | End: 2023-11-30 | Stop reason: SURG

## 2023-11-30 RX ORDER — OXYCODONE HYDROCHLORIDE 5 MG/1
5 TABLET ORAL
Status: DISCONTINUED | OUTPATIENT
Start: 2023-11-30 | End: 2023-12-04 | Stop reason: HOSPADM

## 2023-11-30 RX ORDER — OXYCODONE HYDROCHLORIDE 10 MG/1
10 TABLET ORAL
Status: DISCONTINUED | OUTPATIENT
Start: 2023-11-30 | End: 2023-12-04 | Stop reason: HOSPADM

## 2023-11-30 RX ORDER — SCOLOPAMINE TRANSDERMAL SYSTEM 1 MG/1
1 PATCH, EXTENDED RELEASE TRANSDERMAL
Status: DISCONTINUED | OUTPATIENT
Start: 2023-11-30 | End: 2023-12-04 | Stop reason: HOSPADM

## 2023-11-30 RX ORDER — ENEMA 19; 7 G/133ML; G/133ML
1 ENEMA RECTAL
Status: DISCONTINUED | OUTPATIENT
Start: 2023-11-30 | End: 2023-12-04 | Stop reason: HOSPADM

## 2023-11-30 RX ORDER — OXYCODONE HCL 5 MG/5 ML
5 SOLUTION, ORAL ORAL
Status: COMPLETED | OUTPATIENT
Start: 2023-11-30 | End: 2023-11-30

## 2023-11-30 RX ORDER — CEFAZOLIN SODIUM 1 G/3ML
INJECTION, POWDER, FOR SOLUTION INTRAMUSCULAR; INTRAVENOUS PRN
Status: DISCONTINUED | OUTPATIENT
Start: 2023-11-30 | End: 2023-11-30 | Stop reason: SURG

## 2023-11-30 RX ORDER — ASPIRIN 81 MG/1
81 TABLET ORAL 2 TIMES DAILY
Status: DISCONTINUED | OUTPATIENT
Start: 2023-12-01 | End: 2023-12-04 | Stop reason: HOSPADM

## 2023-11-30 RX ORDER — ONDANSETRON 2 MG/ML
4 INJECTION INTRAMUSCULAR; INTRAVENOUS
Status: DISCONTINUED | OUTPATIENT
Start: 2023-11-30 | End: 2023-11-30 | Stop reason: HOSPADM

## 2023-11-30 RX ORDER — BUPIVACAINE HYDROCHLORIDE AND EPINEPHRINE 5; 5 MG/ML; UG/ML
INJECTION, SOLUTION EPIDURAL; INTRACAUDAL; PERINEURAL
Status: DISCONTINUED | OUTPATIENT
Start: 2023-11-30 | End: 2023-11-30 | Stop reason: HOSPADM

## 2023-11-30 RX ORDER — SODIUM CHLORIDE, SODIUM LACTATE, POTASSIUM CHLORIDE, CALCIUM CHLORIDE 600; 310; 30; 20 MG/100ML; MG/100ML; MG/100ML; MG/100ML
INJECTION, SOLUTION INTRAVENOUS CONTINUOUS
Status: ACTIVE | OUTPATIENT
Start: 2023-11-30 | End: 2023-11-30

## 2023-11-30 RX ORDER — HALOPERIDOL 5 MG/ML
1 INJECTION INTRAMUSCULAR EVERY 6 HOURS PRN
Status: DISCONTINUED | OUTPATIENT
Start: 2023-11-30 | End: 2023-12-04 | Stop reason: HOSPADM

## 2023-11-30 RX ORDER — DEXAMETHASONE SODIUM PHOSPHATE 4 MG/ML
INJECTION, SOLUTION INTRA-ARTICULAR; INTRALESIONAL; INTRAMUSCULAR; INTRAVENOUS; SOFT TISSUE PRN
Status: DISCONTINUED | OUTPATIENT
Start: 2023-11-30 | End: 2023-11-30 | Stop reason: SURG

## 2023-11-30 RX ORDER — HYDRALAZINE HYDROCHLORIDE 20 MG/ML
5 INJECTION INTRAMUSCULAR; INTRAVENOUS
Status: DISCONTINUED | OUTPATIENT
Start: 2023-11-30 | End: 2023-11-30 | Stop reason: HOSPADM

## 2023-11-30 RX ADMIN — PHENYLEPHRINE HYDROCHLORIDE 100 MCG: 10 INJECTION INTRAVENOUS at 17:52

## 2023-11-30 RX ADMIN — DOCUSATE SODIUM 100 MG: 100 CAPSULE, LIQUID FILLED ORAL at 21:58

## 2023-11-30 RX ADMIN — TRANEXAMIC ACID 1000 MG: 100 INJECTION, SOLUTION INTRAVENOUS at 18:52

## 2023-11-30 RX ADMIN — FENTANYL CITRATE 50 MCG: 50 INJECTION, SOLUTION INTRAMUSCULAR; INTRAVENOUS at 19:34

## 2023-11-30 RX ADMIN — FENTANYL CITRATE 50 MCG: 50 INJECTION, SOLUTION INTRAMUSCULAR; INTRAVENOUS at 19:45

## 2023-11-30 RX ADMIN — HYDROMORPHONE HYDROCHLORIDE 0.2 MG: 1 INJECTION, SOLUTION INTRAMUSCULAR; INTRAVENOUS; SUBCUTANEOUS at 20:13

## 2023-11-30 RX ADMIN — EPHEDRINE SULFATE 10 MG: 50 INJECTION, SOLUTION INTRAVENOUS at 18:52

## 2023-11-30 RX ADMIN — SODIUM CHLORIDE, POTASSIUM CHLORIDE, SODIUM LACTATE AND CALCIUM CHLORIDE: 600; 310; 30; 20 INJECTION, SOLUTION INTRAVENOUS at 17:43

## 2023-11-30 RX ADMIN — CEFAZOLIN 2 G: 1 INJECTION, POWDER, FOR SOLUTION INTRAMUSCULAR; INTRAVENOUS at 17:48

## 2023-11-30 RX ADMIN — DOCUSATE SODIUM 50MG AND SENNOSIDES 8.6MG 1 TABLET: 8.6; 5 TABLET, FILM COATED ORAL at 21:58

## 2023-11-30 RX ADMIN — KETOROLAC TROMETHAMINE 30 MG: 30 INJECTION, SOLUTION INTRAMUSCULAR; INTRAVENOUS at 18:54

## 2023-11-30 RX ADMIN — LIDOCAINE HYDROCHLORIDE 70 MG: 20 INJECTION, SOLUTION EPIDURAL; INFILTRATION; INTRACAUDAL at 17:48

## 2023-11-30 RX ADMIN — HYDROMORPHONE HYDROCHLORIDE 1 MG: 1 INJECTION, SOLUTION INTRAMUSCULAR; INTRAVENOUS; SUBCUTANEOUS at 06:07

## 2023-11-30 RX ADMIN — FENTANYL CITRATE 100 MCG: 50 INJECTION, SOLUTION INTRAMUSCULAR; INTRAVENOUS at 18:04

## 2023-11-30 RX ADMIN — HYDROMORPHONE HYDROCHLORIDE 1 MG: 1 INJECTION, SOLUTION INTRAMUSCULAR; INTRAVENOUS; SUBCUTANEOUS at 13:39

## 2023-11-30 RX ADMIN — OXYCODONE HYDROCHLORIDE 10 MG: 5 SOLUTION ORAL at 19:35

## 2023-11-30 RX ADMIN — PHENYLEPHRINE HYDROCHLORIDE 100 MCG: 10 INJECTION INTRAVENOUS at 18:16

## 2023-11-30 RX ADMIN — FENTANYL CITRATE 100 MCG: 50 INJECTION, SOLUTION INTRAMUSCULAR; INTRAVENOUS at 17:48

## 2023-11-30 RX ADMIN — TRANEXAMIC ACID 1000 MG: 100 INJECTION, SOLUTION INTRAVENOUS at 17:48

## 2023-11-30 RX ADMIN — EPHEDRINE SULFATE 10 MG: 50 INJECTION, SOLUTION INTRAVENOUS at 18:07

## 2023-11-30 RX ADMIN — ROCURONIUM BROMIDE 70 MG: 50 INJECTION, SOLUTION INTRAVENOUS at 17:48

## 2023-11-30 RX ADMIN — ONDANSETRON 4 MG: 2 INJECTION INTRAMUSCULAR; INTRAVENOUS at 18:54

## 2023-11-30 RX ADMIN — PHENYLEPHRINE HYDROCHLORIDE 100 MCG: 10 INJECTION INTRAVENOUS at 18:22

## 2023-11-30 RX ADMIN — DEXAMETHASONE SODIUM PHOSPHATE 4 MG: 4 INJECTION INTRA-ARTICULAR; INTRALESIONAL; INTRAMUSCULAR; INTRAVENOUS; SOFT TISSUE at 17:48

## 2023-11-30 RX ADMIN — HYDROMORPHONE HYDROCHLORIDE 0.2 MG: 1 INJECTION, SOLUTION INTRAMUSCULAR; INTRAVENOUS; SUBCUTANEOUS at 20:22

## 2023-11-30 RX ADMIN — OXYCODONE HYDROCHLORIDE 10 MG: 10 TABLET ORAL at 21:58

## 2023-11-30 RX ADMIN — HYDROMORPHONE HYDROCHLORIDE 0.2 MG: 1 INJECTION, SOLUTION INTRAMUSCULAR; INTRAVENOUS; SUBCUTANEOUS at 20:39

## 2023-11-30 RX ADMIN — PHENYLEPHRINE HYDROCHLORIDE 100 MCG: 10 INJECTION INTRAVENOUS at 18:52

## 2023-11-30 RX ADMIN — HYDROMORPHONE HYDROCHLORIDE 0.4 MG: 1 INJECTION, SOLUTION INTRAMUSCULAR; INTRAVENOUS; SUBCUTANEOUS at 20:31

## 2023-11-30 RX ADMIN — HYDROMORPHONE HYDROCHLORIDE 1 MG: 1 INJECTION, SOLUTION INTRAMUSCULAR; INTRAVENOUS; SUBCUTANEOUS at 09:39

## 2023-11-30 RX ADMIN — SUGAMMADEX 200 MG: 100 INJECTION, SOLUTION INTRAVENOUS at 19:11

## 2023-11-30 RX ADMIN — EPHEDRINE SULFATE 10 MG: 50 INJECTION, SOLUTION INTRAVENOUS at 18:02

## 2023-11-30 RX ADMIN — HYDROMORPHONE HYDROCHLORIDE 0.5 MG: 1 INJECTION, SOLUTION INTRAMUSCULAR; INTRAVENOUS; SUBCUTANEOUS at 02:38

## 2023-11-30 ASSESSMENT — ENCOUNTER SYMPTOMS
FEVER: 0
SHORTNESS OF BREATH: 0
HEARTBURN: 0
MYALGIAS: 1
DIARRHEA: 0
INSOMNIA: 0
DEPRESSION: 0
CHILLS: 0
BLURRED VISION: 0
SENSORY CHANGE: 0
NERVOUS/ANXIOUS: 0
ABDOMINAL PAIN: 0
CLAUDICATION: 0
PHOTOPHOBIA: 0
SPEECH CHANGE: 0
DIZZINESS: 0
WEAKNESS: 0
VOMITING: 0
CONSTIPATION: 0
HEADACHES: 0
COUGH: 0

## 2023-11-30 ASSESSMENT — PAIN DESCRIPTION - PAIN TYPE
TYPE: SURGICAL PAIN
TYPE: ACUTE PAIN
TYPE: SURGICAL PAIN
TYPE: SURGICAL PAIN
TYPE: ACUTE PAIN
TYPE: SURGICAL PAIN
TYPE: ACUTE PAIN
TYPE: SURGICAL PAIN
TYPE: ACUTE PAIN
TYPE: SURGICAL PAIN
TYPE: ACUTE PAIN
TYPE: SURGICAL PAIN
TYPE: ACUTE PAIN
TYPE: SURGICAL PAIN

## 2023-11-30 ASSESSMENT — COGNITIVE AND FUNCTIONAL STATUS - GENERAL
MOVING FROM LYING ON BACK TO SITTING ON SIDE OF FLAT BED: A LOT
MOVING TO AND FROM BED TO CHAIR: A LOT
STANDING UP FROM CHAIR USING ARMS: TOTAL
WALKING IN HOSPITAL ROOM: TOTAL
MOBILITY SCORE: 10
DAILY ACTIVITIY SCORE: 23
SUGGESTED CMS G CODE MODIFIER MOBILITY: CL
TURNING FROM BACK TO SIDE WHILE IN FLAT BAD: A LITTLE
SUGGESTED CMS G CODE MODIFIER DAILY ACTIVITY: CI
CLIMB 3 TO 5 STEPS WITH RAILING: TOTAL
DRESSING REGULAR LOWER BODY CLOTHING: A LITTLE

## 2023-11-30 ASSESSMENT — LIFESTYLE VARIABLES
TOTAL SCORE: 0
HAVE PEOPLE ANNOYED YOU BY CRITICIZING YOUR DRINKING: NO
CONSUMPTION TOTAL: NEGATIVE
ON A TYPICAL DAY WHEN YOU DRINK ALCOHOL HOW MANY DRINKS DO YOU HAVE: 0
HAVE YOU EVER FELT YOU SHOULD CUT DOWN ON YOUR DRINKING: NO
AVERAGE NUMBER OF DAYS PER WEEK YOU HAVE A DRINK CONTAINING ALCOHOL: 0
EVER FELT BAD OR GUILTY ABOUT YOUR DRINKING: NO
EVER HAD A DRINK FIRST THING IN THE MORNING TO STEADY YOUR NERVES TO GET RID OF A HANGOVER: NO
TOTAL SCORE: 0
ALCOHOL_USE: NO
HOW MANY TIMES IN THE PAST YEAR HAVE YOU HAD 5 OR MORE DRINKS IN A DAY: 0
TOTAL SCORE: 0

## 2023-11-30 ASSESSMENT — FIBROSIS 4 INDEX: FIB4 SCORE: 2.8

## 2023-11-30 NOTE — PROGRESS NOTES
4 Eyes Skin Assessment Completed by ALEXSANDRA Dubose and ALEXSANDRA Hernández.    Head Scabs  Ears WDL  Nose WDL  Mouth WDL  Neck WDL  Breast/Chest WDL  Shoulder Blades WDL  Spine WDL  (R) Arm/Elbow/Hand Bruising and Scabs on hand  (L) Arm/Elbow/Hand Bruising and Scabs on hand  Abdomen WDL  Groin WDL  Scrotum/Coccyx/Buttocks Redness and Blanching  (R) Leg healing scar on right hip, bruising, scabs on shin  (L) Leg Scabs on shin  (R) Heel/Foot/Toe WDL  (L) Heel/Foot/Toe WDL          Devices In Places Pulse Ox      Interventions In Place Pillows    Possible Skin Injury No    Pictures Uploaded Into Epic N/A  Wound Consult Placed N/A  RN Wound Prevention Protocol Ordered No

## 2023-11-30 NOTE — ASSESSMENT & PLAN NOTE
-Cardiovascular risk is mild.  Patient is medically optimized for surgical interventions without additional work-up.

## 2023-11-30 NOTE — ASSESSMENT & PLAN NOTE
Hematoma improving daily  H/h dropped with IV hydration, no worsening swelling in the surgery site/hematoma.  Pain management

## 2023-11-30 NOTE — ED TRIAGE NOTES
"Chief Complaint   Patient presents with    T-5000 GLF     Fell 8 Feet off the ratters   Pt reports only pain is in his right hip  This injury occurred around 1500      /77   Pulse (!) 51   Temp 36.7 °C (98.1 °F) (Temporal)   Resp 18   Ht 1.727 m (5' 8\")   Wt 65.8 kg (145 lb)   SpO2 98%   BMI 22.05 kg/m²     "

## 2023-11-30 NOTE — H&P
Surgery Orthopedic History & Physical Note    Date  11/30/2023    Primary Care Physician  Brandon Magallanes D.O.    CC  Right hip pain    HPI  This is a 73 y.o. male who presented with acute onset right hip pain last night after he was pulling some wires through some rafters when he fell approximately 8 feet onto his right hip with immediate severe pain and inability to bear weight.  He is approximately 5-month status post right primary total hip arthroplasty with my colleague Dr. Clement.  Up until last night he was doing very well.  He was admitted to the hospitalist I was consulted orthopedic surgeon on-call.  His pain is approximately 8 of 10 severity is an aching pain some amenable to bed rest ice and pain medication.  He denies pain elsewhere in his extremities or numbness in the right lower extremity.    Past Medical History:   Diagnosis Date    Ascending aorta dilation (HCC)     CAD (coronary artery disease)     Chronic pain     High cholesterol     Myocardial infarct (HCC)        Past Surgical History:   Procedure Laterality Date    IL TOTAL HIP ARTHROPLASTY Right 8/14/2023    Procedure: RIGHT TOTAL HIP ARTHROPLASTY;  Surgeon: Clark Clement M.D.;  Location: [facility];  Service: Orthopedics    PB INJECT RX OTHER PERIPH NERVE Right 5/30/2023    Procedure: RIGHT hip acetabular neurotomy of the femoral and obturator sensory nerves with fluoroscopic guidance with sedation.;  Surgeon: Brooke Brown M.D.;  [facility];  Service: Pain Management    IL INJ(S) NERVE BLOCK FEMORAL (INCLUDES IG) Right 5/2/2023    Procedure: Diagnostic RIGHT femoral and obturator nerve blocks with fluoroscopic guidance;  Surgeon: Brooke Brown M.D.;  Location: [facility];  Service: Pain Management    IL DRAIN/INJECT LARGE JOINT/BURSA Right 4/11/2023    Procedure: Diagnostic and therapeutic Fluoroscopically guided intra-articular RIGHT hip injection;  Surgeon: Brooke  ANGELIQUE Brown M.D.;  Location: [facility];  Service: Pain Management    INJ,ANES LUMBAR/CERVICAL Right 3/21/2023    Procedure: Diagnostic medial branch blocks targeting the RIGHT L4-5 and L5-S1 facet joints with fluoroscopic guidance #1;  Surgeon: Brooke Brown M.D.;  Location: [facility];  Service: Pain Management    IRRIGATION & DEBRIDEMENT ORTHO Left 12/8/2021    Procedure: IRRIGATION AND DEBRIDEMENT, WOUND - HAND;  Surgeon: Guillermo Villalobos M.D.;  Location: [facility];  Service: Orthopedics    ZZZ CARDIAC CATH  07/2021    PCI to OM1     KNEE ARTHROSCOPY      SHOULDER ARTHROSCOPY         Current Facility-Administered Medications   Medication Dose Route Frequency Provider Last Rate Last Admin    acetaminophen (Tylenol) tablet 650 mg  650 mg Oral Q6HRS PRN Becca Rock M.D.        ondansetron (Zofran) syringe/vial injection 4 mg  4 mg Intravenous Q4HRS PRN Becca Rock M.D.        ondansetron (Zofran ODT) dispertab 4 mg  4 mg Oral Q4HRS PRN Becca Rock M.D.        senna-docusate (Pericolace Or Senokot S) 8.6-50 MG per tablet 2 Tablet  2 Tablet Oral BID Becca Rock M.D.        And    polyethylene glycol/lytes (Miralax) Packet 1 Packet  1 Packet Oral QDAY PRN Becca Rock M.D.        And    magnesium hydroxide (Milk Of Magnesia) suspension 30 mL  30 mL Oral QDAY PRN Becca Rock M.D.        And    bisacodyl (Dulcolax) suppository 10 mg  10 mg Rectal QDAY PRN Becca Rock M.D.        HYDROmorphone (Dilaudid) injection 0.5 mg  0.5 mg Intravenous Q3HRS PRN Becca Rock M.D.   0.5 mg at 11/30/23 0238    HYDROmorphone (Dilaudid) injection 1 mg  1 mg Intravenous Q3HRS PRN Becca Rock M.D.   1 mg at 11/30/23 0607       Social History     Socioeconomic History    Marital status:      Spouse name: Not on file    Number of children: Not on file    Years of education: Not  on file    Highest education level: Not on file   Occupational History    Not on file   Tobacco Use    Smoking status: Former     Current packs/day: 0.00     Types: Cigarettes     Quit date:      Years since quittin.9    Smokeless tobacco: Former     Types: Chew     Quit date:    Vaping Use    Vaping Use: Never used   Substance and Sexual Activity    Alcohol use: Not Currently    Drug use: No    Sexual activity: Not on file   Other Topics Concern    Not on file   Social History Narrative    Not on file     Social Determinants of Health     Financial Resource Strain: Not on file   Food Insecurity: Not on file   Transportation Needs: Not on file   Physical Activity: Not on file   Stress: Not on file   Social Connections: Not on file   Intimate Partner Violence: Not on file   Housing Stability: Not on file       Family History   Problem Relation Age of Onset    Heart Disease Mother     Heart Failure Mother        Allergies  Patient has no known allergies.    Review of Systems  Negative    Physical Exam    Vital Signs  Blood Pressure : 110/69   Temperature: 37.1 °C (98.8 °F)   Pulse: (!) 54   Respiration: 18   Pulse Oximetry: 92 %   General:  Well appearing, in no acute distress. Appropriate and cooperative with the exam.  HEENT: Normocephalic, atraumatic. Cranial nerves II-XII are grossly intact.  Cardiovascular: 2+ distal pulses. No cyanosis, clubbing, or edema.  Pulmonary: Breathing comfortably on room air without labor.  Abdomen: Soft and unremarkable.  Skin: No rashes, jaundice, or cyanosis.  Lymphatic: No epitrochlear lymphadenopathy.  Spine:  Clinically midline.   Gait: Currently nonambulatory in a hospital bed  Musculoskeletal: His right leg is shortened actually rotated thigh and leg are soft he has no knee or ankle tenderness on the right.  Bilateral upper extremities and left lower extremity without tenderness palpation pain with range of motion  Neurologic: Right lower extremity sensation tact  light touch L4, L5, S1 dermatomes.      Labs:  Recent Labs     11/29/23 2114 11/30/23  0042 11/30/23  0619   WBC 8.0 6.2  --    RBC 3.65* 3.59*  --    HEMOGLOBIN 12.1* 11.8* 11.6*   HEMATOCRIT 33.9* 34.3* 33.0*   MCV 92.9 95.5  --    MCH 33.2* 32.9  --    MCHC 35.7 34.4  --    RDW 42.7 43.4  --    PLATELETCT 141* 136*  --    MPV 9.7 9.8  --      Recent Labs     11/29/23 2114 11/30/23  0042   SODIUM 135 135   POTASSIUM 3.6 3.8   CHLORIDE 103 103   CO2 21 22   GLUCOSE 89 91   BUN 22 21   CREATININE 0.73 0.72   CALCIUM 8.5 8.4         Recent Labs     11/29/23 2114   ASTSGOT 28   ALTSGPT 26   TBILIRUBIN 0.7   ALKPHOSPHAT 78   GLOBULIN 2.1       Radiology:  DX-PELVIS-1 OR 2 VIEWS   Final Result         1.  Periprosthetic fracture of the right greater trochanter.      CT-PELVIS W/O PLUS RECONS   Final Result         1.  Periprosthetic fracture adjacent to right hip arthroplasty femoral component.   2.  Intermediate density and enlargement of the anterior proximal thigh musculature, appearance most compatible with fracture hematoma. Evaluation and monitoring for development of compartment syndrome recommended as clinically appropriate.   3.  Atherosclerosis        AP pelvis compared to his next recent AP pelvis status post total of arthroplasty seems to demonstrate rotation and loosening of the femoral component with a greater trochanteric fracture extending into the anterior metaphysis.  Well fixed acetabular component.    Assessment/Plan:  73-year-old male status post fall onto a right total of arthroplasty with likely loosening of the femoral component.    Plan: I recommend admission to hospitalist for medical management DVT prophylaxis and pain control.  I have notified his surgeon Dr. Clement of his current admission.  Continue n.p.o. for possible surgical intervention today, pending plan from Dr. Clement.  This was all explained to the patient and he and operative asked questions and consents to the plan.

## 2023-11-30 NOTE — DISCHARGE PLANNING
"Case Management Discharge Planning    Admission Date: 11/29/2023  GMLOS: 3.9  ALOS: 1    6-Clicks ADL Score: 23  6-Clicks Mobility Score: 10  PT and/or OT Eval ordered: Yes  Post-acute Referrals Ordered: Yes  Post-acute Choice Obtained: No  Has referral(s) been sent to post-acute provider:  Yes      Anticipated Discharge Dispo: Discharge Disposition: D/T to SNF with Medicare cert in anticipation of skilled care (03)    DME Needed: No    Action(s) Taken: DC Assessment Complete (See below), Referral(s) sent, and Completed PASSR/LOC    Escalations Completed: None    Medically Clear: No    Barriers to Discharge: Medical clearance, Pending Placement, and Pending PT Evaluation    Course of Action  **0840  LSW spoke to patient at bedside. Confirmed facesheet information. Patient reports full independence prior to fall. States no assistance needed with ADLs and no DME use. Patient lives with his spouse, Ene. PCP is Dr. Magallanes. LSW discussed whether patient would be open to rehab after hip surgery, if recommended by PT/OT. Patient said \"I guess so.\" LSW following. Plan to discuss SNF, IRF, or home health with patient once PT/OT recommendations are in. Current six clicks warrant placement. LSW sending out SNF referrals, per protocol.  Created PASRR: [id number]     Care Transition Team Assessment    Information Source  Orientation Level: Oriented X4  Information Given By: Patient  Who is responsible for making decisions for patient? : Patient    Readmission Evaluation  Is this a readmission?: No    Elopement Risk  Legal Hold: No  Elopement Risk: Not at Risk for Elopement    Interdisciplinary Discharge Planning  Lives with - Patient's Self Care Capacity: Spouse  Patient or legal guardian wants to designate a caregiver: No  Support Systems: Spouse / Significant Other  Durable Medical Equipment: Not Applicable    Discharge Preparedness  What is your plan after discharge?: Home with help  What are your discharge supports?: " Spouse  Prior Functional Level: Independent with Activities of Daily Living, Independent with Medication Management  Difficulity with ADLs: None  Difficulity with IADLs: None    Functional Assesment  Prior Functional Level: Independent with Activities of Daily Living, Independent with Medication Management    Finances  Financial Barriers to Discharge: No  Prescription Coverage: Yes    Vision / Hearing Impairment  Vision Impairment : No  Hearing Impairment : No    Advance Directive  Advance Directive?: None    Domestic Abuse  Have you ever been the victim of abuse or violence?: No  Physical Abuse or Sexual Abuse: No  Verbal Abuse or Emotional Abuse: No  Possible Abuse/Neglect Reported to:: Not Applicable    Discharge Risks or Barriers  Discharge risks or barriers?: No    Anticipated Discharge Information  Discharge Disposition: D/T to SNF with Medicare cert in anticipation of skilled care (03)

## 2023-11-30 NOTE — ED NOTES
Report received from Griselda UPTON, all care assumed at this time.     Pt rounded on a provided warm blankets.

## 2023-11-30 NOTE — H&P
Hospital Medicine History & Physical Note    Date of Service  11/29/2023    Primary Care Physician  Brandon Magallanes D.O.    Consultants  orthopedics    Specialist Names: ERP discussed with Dr. Alaniz    Code Status  Full Code    Chief Complaint  Chief Complaint   Patient presents with    T-5000 GLF     Fell 8 Feet off the ratters   Pt reports only pain is in his right hip  This injury occurred around 1500        History of Presenting Illness  Vasile Bowers is a 73 y.o. male, with h/o HLD, Chronic back pain who had elective right total hip arthroplasty ( Dr. 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  He landed on his right hip around 3 PM this afternoon.  Patient unable to walk after the event and having worsening pain therefore came to the ED for further evaluation.  Patient reports feeling dizzy or having any symptoms prior to this event.  He also denies loss of consciousness or head injury.  He is not on blood thinners but does take a baby aspirin.      ER COURSE:  -Patient is hemodynamically stable.  CT imaging done in the emergency department is showing periprostatic fracture adjacent to the right hip arthroplasty with femoral component.  There is also intermediate density and enlargement of the anterior proximal thigh musculature compatible with fracture hematoma.  -Patient received total of 10 mg of oxycodone in the emergency department.  He is not reporting much pain if not moving his leg at this time.  -ERP discussed this case with orthopedic surgery team, Dr. Alaniz who will arrange for evaluation in the morning.  -His initial hemoglobin is 12.1        I discussed the plan of care with patient and ERP Dr. Moreno .    Review of Systems  Review of Systems   Constitutional:  Positive for malaise/fatigue. Negative for fever.   HENT:  Negative for congestion and sore  throat.    Eyes:  Negative for blurred vision and double vision.   Respiratory:  Negative for cough and shortness of breath.    Cardiovascular:  Negative for chest pain and palpitations.   Gastrointestinal:  Negative for nausea and vomiting.   Genitourinary:  Negative for dysuria and urgency.   Musculoskeletal:  Positive for falls and joint pain. Negative for myalgias and neck pain.   Skin:  Negative for itching and rash.   Neurological:  Negative for dizziness, weakness and headaches.   Endo/Heme/Allergies:  Does not bruise/bleed easily.   Psychiatric/Behavioral:  Negative for depression. The patient does not have insomnia.        Past Medical History   has a past medical history of Ascending aorta dilation (HCC), CAD (coronary artery disease), Chronic pain, High cholesterol, and Myocardial infarct (HCC).    Surgical History   has a past surgical history that includes knee arthroscopy; shoulder arthroscopy; zzz cardiac cath (07/2021); irrigation & debridement ortho (Left, 12/8/2021); inj,anes lumbar/cervical (Right, 3/21/2023); pr drain/inject large joint/bursa (Right, 4/11/2023); pr inj(s) nerve block femoral (includes ig) (Right, 5/2/2023); pr inject rx other periph nerve (Right, 5/30/2023); and pr total hip arthroplasty (Right, 8/14/2023).     Family History  family history includes Heart Disease in his mother; Heart Failure in his mother.   Family history reviewed with patient. There is no family history that is pertinent to the chief complaint.     Social History   reports that he quit smoking about 45 years ago. His smoking use included cigarettes. He quit smokeless tobacco use about 51 years ago.  His smokeless tobacco use included chew. He reports that he does not currently use alcohol. He reports that he does not use drugs.    Allergies  No Known Allergies    Medications  Prior to Admission Medications   Prescriptions Last Dose Informant Patient Reported? Taking?   Ascorbic Acid (VITAMIN C PO)  Patient Yes  No   Sig: Take 1 tablet by mouth every day.   Cholecalciferol (VITAMIN D3) 222957 UNIT/GM Powder   Yes No   Sig: Take 1 Capsule by mouth every day.   Cyanocobalamin (B-12 PO)  Patient Yes No   Sig: Take 1 tablet by mouth every day.   Glucosamine-Chondroit-Vit C-Mn (GLUCOSAMINE 1500 COMPLEX PO)   Yes No   Sig: Take 1 Tablet by mouth every day.   Turmeric 500 MG Cap   Yes No   Sig: Take 1 Capsule by mouth every day.   VITAMIN K PO   Yes No   Sig: Take 1 Tablet by mouth every day.   aspirin EC 81 MG EC tablet  Patient No No   Sig: Take 1 tablet by mouth every day.   Patient taking differently: Take 81 mg by mouth every evening.   atorvastatin (LIPITOR) 80 MG tablet   No No   Sig: Take 1 Tablet by mouth every evening.   gabapentin (NEURONTIN) 300 MG Cap   No No   Sig: Take 1 Capsule by mouth 2 times a day for 90 days. Take 300 mg twice a day. Increase by one every 4-5 days to a max of 3 in the morning and 3 at night (1800 mg/day   meloxicam (MOBIC) 7.5 MG Tab   No No   Sig: Take 1 Tablet by mouth every day.   pregabalin (LYRICA) 50 MG capsule   No No   Sig: Take 1 Capsule by mouth 3 times a day for 14 days.      Facility-Administered Medications: None       Physical Exam  Temp:  [36.7 °C (98.1 °F)] 36.7 °C (98.1 °F)  Pulse:  [51] 51  Resp:  [18] 18  BP: (121)/(77) 121/77  SpO2:  [98 %] 98 %  Blood Pressure : 121/77   Temperature: 36.7 °C (98.1 °F)   Pulse: (!) 51   Respiration: 18   Pulse Oximetry: 98 %       Physical Exam  Constitutional:       Appearance: Normal appearance.   HENT:      Head: Normocephalic and atraumatic.      Nose: Nose normal.      Mouth/Throat:      Mouth: Mucous membranes are moist.      Pharynx: Oropharynx is clear.   Eyes:      Extraocular Movements: Extraocular movements intact.      Pupils: Pupils are equal, round, and reactive to light.   Cardiovascular:      Rate and Rhythm: Normal rate and regular rhythm.      Pulses: Normal pulses.      Heart sounds: Normal heart sounds.   Pulmonary:       Effort: Pulmonary effort is normal.      Breath sounds: Normal breath sounds.   Abdominal:      General: Abdomen is flat. Bowel sounds are normal.      Palpations: Abdomen is soft.   Musculoskeletal:      Cervical back: Normal range of motion and neck supple.      Comments: Limited ROM to the Right Lower extremity due to pain    Right Tight: 15.5 in near knee and 21 inches upper tight near hip, tense to touch  Left Tight: 15 inches x 18 3/4 inches   Skin:     General: Skin is warm and dry.   Neurological:      General: No focal deficit present.      Mental Status: He is alert and oriented to person, place, and time.   Psychiatric:         Mood and Affect: Mood normal.         Behavior: Behavior normal.         Laboratory:                      Recent Labs     11/29/23  2114   ALTSGPT 26   ASTSGOT 28   ALKPHOSPHAT 78   TBILIRUBIN 0.7   GLUCOSE 89             Imaging:  CT-PELVIS W/O PLUS RECONS   Final Result         1.  Periprosthetic fracture adjacent to right hip arthroplasty femoral component.   2.  Intermediate density and enlargement of the anterior proximal thigh musculature, appearance most compatible with fracture hematoma. Evaluation and monitoring for development of compartment syndrome recommended as clinically appropriate.   3.  Atherosclerosis          X-Ray:  I have personally reviewed the images and compared with prior images. and My impression is: CT of the pelvis is showing periprostatic fracture with femoral component on the right hip.  He also has proximal tight hematoma and enlargement intermediate density of his upper tight.    Assessment/Plan:  Justification for Admission Status  I anticipate this patient will require at least two midnights for appropriate medical management, necessitating inpatient admission because 73-year-old male, coming with periprostatic fracture with femoral component to the right hip, status post 8 feet fall.  No head injury.  There is also upper thigh hematoma with  intermediate density enlargement of the musculature.      * Periprosthetic fracture of hip, initial encounter- (present on admission)  Assessment & Plan  -Inpatient status on medical floor.  -Patient fell from the roof, about 8 feet high.  -Initial right hip arthroplasty was done in August 2023.  -CT imaging showing periprostatic fracture adjacent to the right hip arthropathy of femoral component.  -I appreciate orthopedic consult and recommendations.  ERP discussed with Dr. Alaniz who will arrange for formal consult in the morning.  -Patient will remain NPO.  Close monitoring overnight with pain control as he also has hematoma component and has higher risk to develop compartment syndrome.  -At the time of my evaluation, his pain is well controlled and clinically not showing evidence of compartment syndrome at this time.    Preoperative cardiovascular examination  Assessment & Plan  -Cardiovascular risk is mild.  Patient is medically optimized for surgical interventions without additional work-up.    Traumatic hematoma of hip, right, initial encounter  Assessment & Plan  -CT showing intermediate density and enlargement of the anterior proximal tight musculature compatible with fracture hematoma.  -There is a 2 inch size difference between the right and left leg.  The right thigh is more tense and tight as compared to the left.  -Patient received 10 mg of oxycodone in the emergency department and his pain is currently well controlled.  -He will be n.p.o. and I will closely monitor overnight.  He is at higher risk for compartment syndrome given hematoma.  He is taking a baby aspirin but no other anticoagulation.  -His initial hemoglobin is 12.  I will monitor H&H, also added a CPK and lactic acid which we will will monitor.  -Patient was instructed to keep nursing staff updated regarding his pain status overnight.  -Closely monitor.  Plans above.    Acute blood loss anemia  Assessment & Plan  -Under the context  of the large traumatic fracture hematoma.  Will monitor as above.    Lumbar stenosis- (present on admission)  Assessment & Plan  -He is on gabapentin but currently NPO.    Hyperlipidemia  Assessment & Plan  -On statins.  N.p.o. now.        VTE prophylaxis: SCDs/TEDs

## 2023-11-30 NOTE — PROGRESS NOTES
Hospital Medicine Daily Progress Note    Date of Service  11/30/2023    Chief Complaint  Vasile Bowers is a 73 y.o. male admitted 11/29/2023 with right hip and thigh pain    Hospital Course  Patient is a 73-year-old male who presented after a fall from 8 foot height.  He continued to have significant pain after the fall and he has been unable to walk after the event.  Has been having increasing swelling in the left thigh.  CT scan shows a periprosthetic fracture adjacent to the right hip arthroplasty with femoral component and intermediate density enlargement of the anterior proximal thigh musculature compatible with hematoma.  Patient was seen by orthopedic surgery first thing this morning with Dr. Alaniz who deferred to Dr. Clement and patient stated he received a phone call from Dr. Clement that he be going to the OR at 4.    Interval Problem Update  11/30 patient continues to be in significant pain at time of examination.  He states that the hematoma in his right thigh feels a little bit softer and not quite as swollen as it was yesterday.  Patient remains n.p.o. to go to the OR later today with Dr. Clement    I have discussed this patient's plan of care and discharge plan at IDT rounds today with Case Management, Nursing, Nursing leadership, and other members of the IDT team.    Consultants/Specialty  orthopedics    Code Status  Full Code    Disposition  The patient is not medically cleared for discharge to home or a post-acute facility.  Anticipate discharge to: home with close outpatient follow-up    I have placed the appropriate orders for post-discharge needs.    Review of Systems  Review of Systems   Constitutional:  Negative for chills and fever.   HENT:  Negative for congestion.    Eyes:  Negative for blurred vision and photophobia.   Respiratory:  Negative for cough and shortness of breath.    Cardiovascular:  Negative for chest pain, claudication and leg swelling.   Gastrointestinal:   Negative for abdominal pain, constipation, diarrhea, heartburn and vomiting.   Genitourinary:  Negative for dysuria and hematuria.   Musculoskeletal:  Positive for joint pain (Left hip pain) and myalgias (Left thigh swollen).   Skin:  Negative for itching and rash.   Neurological:  Negative for dizziness, sensory change, speech change, weakness and headaches.   Psychiatric/Behavioral:  Negative for depression. The patient is not nervous/anxious and does not have insomnia.         Physical Exam  Temp:  [36.7 °C (98.1 °F)-37.1 °C (98.8 °F)] 36.8 °C (98.2 °F)  Pulse:  [50-55] 55  Resp:  [18] 18  BP: (110-130)/(64-77) 111/64  SpO2:  [92 %-99 %] 99 %    Physical Exam  Vitals and nursing note reviewed.   Constitutional:       General: He is not in acute distress.     Appearance: Normal appearance.   HENT:      Head: Normocephalic and atraumatic.   Eyes:      General: No scleral icterus.     Extraocular Movements: Extraocular movements intact.   Cardiovascular:      Rate and Rhythm: Normal rate and regular rhythm.      Pulses: Normal pulses.      Heart sounds: Normal heart sounds. No murmur heard.  Pulmonary:      Effort: Pulmonary effort is normal. No respiratory distress.      Breath sounds: Normal breath sounds. No wheezing, rhonchi or rales.   Abdominal:      General: Abdomen is flat. Bowel sounds are normal. There is no distension.      Palpations: Abdomen is soft.      Tenderness: There is no rebound.   Musculoskeletal:         General: Swelling and tenderness (Left thigh swelling with hematoma) present.      Cervical back: Normal range of motion and neck supple.   Lymphadenopathy:      Cervical: No cervical adenopathy.   Skin:     Coloration: Skin is not jaundiced.      Findings: No erythema.   Neurological:      General: No focal deficit present.      Mental Status: He is alert and oriented to person, place, and time. Mental status is at baseline.      Cranial Nerves: No cranial nerve deficit.   Psychiatric:          Mood and Affect: Mood normal.         Behavior: Behavior normal.         Fluids    Intake/Output Summary (Last 24 hours) at 11/30/2023 1409  Last data filed at 11/30/2023 0230  Gross per 24 hour   Intake --   Output 550 ml   Net -550 ml       Laboratory  Recent Labs     11/29/23 2114 11/30/23  0042 11/30/23  0619 11/30/23  1230   WBC 8.0 6.2  --   --    RBC 3.65* 3.59*  --   --    HEMOGLOBIN 12.1* 11.8* 11.6* 11.6*   HEMATOCRIT 33.9* 34.3* 33.0* 32.6*   MCV 92.9 95.5  --   --    MCH 33.2* 32.9  --   --    MCHC 35.7 34.4  --   --    RDW 42.7 43.4  --   --    PLATELETCT 141* 136*  --   --    MPV 9.7 9.8  --   --      Recent Labs     11/29/23 2114 11/30/23 0042   SODIUM 135 135   POTASSIUM 3.6 3.8   CHLORIDE 103 103   CO2 21 22   GLUCOSE 89 91   BUN 22 21   CREATININE 0.73 0.72   CALCIUM 8.5 8.4                   Imaging  DX-PELVIS-1 OR 2 VIEWS   Final Result         1.  Periprosthetic fracture of the right greater trochanter.      CT-PELVIS W/O PLUS RECONS   Final Result         1.  Periprosthetic fracture adjacent to right hip arthroplasty femoral component.   2.  Intermediate density and enlargement of the anterior proximal thigh musculature, appearance most compatible with fracture hematoma. Evaluation and monitoring for development of compartment syndrome recommended as clinically appropriate.   3.  Atherosclerosis      DX-HIP-UNILATERAL-WITHOUT PELVIS-1 VIEW RIGHT    (Results Pending)   DX-PORTABLE FLUORO > 1 HOUR    (Results Pending)        Assessment/Plan  * Periprosthetic fracture of hip, initial encounter- (present on admission)  Assessment & Plan  -Patient fell from the roof, about 8 feet high.  -Initial right hip arthroplasty was done in August 2023.  -CT imaging showing periprostatic fracture adjacent to the right hip arthropathy of femoral component.  -Patient seen by Dr. Alaniz this morning who deferred the patient to Dr. Clement, patient to go to the OR later today at 4 PM  Patient remains  n.p.o.  Continue with pain management    Hyperlipidemia  Assessment & Plan  -On statins.  N.p.o. now.    Acute blood loss anemia  Assessment & Plan  -Under the context of the large traumatic fracture hematoma.  Will monitor as above.  H&H stable    Preoperative cardiovascular examination  Assessment & Plan  -Cardiovascular risk is mild.  Patient is medically optimized for surgical interventions without additional work-up.    Traumatic hematoma of hip, right, initial encounter  Assessment & Plan  -CT showing intermediate density and enlargement of the anterior proximal tight musculature compatible with fracture hematoma.  -There is a 2 inch size difference between the right and left leg.  The right thigh is more tense and tight as compared to the left.  -Patient received 10 mg of oxycodone in the emergency department and his pain is currently well controlled.  Patient states hematoma feels softer to him this morning    Lumbar stenosis- (present on admission)  Assessment & Plan  -He is on gabapentin but currently NPO.         VTE prophylaxis:   SCDs/TEDs      I have performed a physical exam and reviewed and updated ROS and Plan today (11/30/2023). In review of yesterday's note (11/29/2023), there are no changes except as documented above.

## 2023-11-30 NOTE — CARE PLAN
The patient is Stable - Low risk of patient condition declining or worsening    Shift Goals  Clinical Goals: pain management, NPO  Patient Goals: rest  Family Goals: LEONARDA    Progress made toward(s) clinical / shift goals:  Pt pain is managed with PRNs and nonpharm interventions, strict NPO, POC discussed with pt, bed alarm on, call light within reach    Patient is not progressing towards the following goals:      Problem: Pain - Standard  Goal: Alleviation of pain or a reduction in pain to the patient’s comfort goal  Outcome: Progressing     Problem: Knowledge Deficit - Standard  Goal: Patient and family/care givers will demonstrate understanding of plan of care, disease process/condition, diagnostic tests and medications  Outcome: Progressing     Problem: Fall Risk  Goal: Patient will remain free from falls  Outcome: Progressing

## 2023-11-30 NOTE — ED PROVIDER NOTES
ED Provider Note    Primary care provider: Brandon Magallanes D.O.    CHIEF COMPLAINT  Chief Complaint   Patient presents with    T-5000 GLF     Fell 8 Feet off the ratters   Pt reports only pain is in his right hip  This injury occurred around 1500      HPI  Vasile Bowers is a 73 y.o. male who presents to the Emergency Department pain in his sacral area.  The patient was working on some rafters pulling wire when he slipped, fell and landed 8 feet below him on his buttock.  He did not strike his head or have loss of consciousness.  He is able to get up afterwards and drive himself home.  He notes significant pain in the posterior region, denies any low back pain.  He notes some right hip pain.  Denies any headache, loss of consciousness, numbness or focal weakness.      External Record Review: Patient was in the [facility] yesterday to follow-up with Dr. Heath.  History of subacute L1 compression fracture and severe bilateral neuroforaminal narrowing at L4-S1.  He presented with left hip pain, chronic back pain.  Plan was to set him up with a pain management referral, epidural, EMG and repeat MRI.    REVIEW OF SYSTEMS  See HPI.     PAST MEDICAL HISTORY   has a past medical history of Ascending aorta dilation (HCC), CAD (coronary artery disease), Chronic pain, High cholesterol, and Myocardial infarct (HCC).    SURGICAL HISTORY   has a past surgical history that includes knee arthroscopy; shoulder arthroscopy; zzz cardiac cath (2021); irrigation & debridement ortho (Left, 2021); inj,anes lumbar/cervical (Right, 3/21/2023); drain/inject large joint/bursa (Right, 2023); inj(s) nerve block femoral (includes ig) (Right, 2023); inject rx other periph nerve (Right, 2023); and total hip arthroplasty (Right, 2023).    SOCIAL HISTORY  Social History     Tobacco Use    Smoking status: Former     Current packs/day: 0.00     Types: Cigarettes     Quit date:      Years since quittin.9     "Smokeless tobacco: Former     Types: Chew     Quit date: 1972   Vaping Use    Vaping Use: Never used   Substance Use Topics    Alcohol use: Not Currently    Drug use: No      Social History     Substance and Sexual Activity   Drug Use No       FAMILY HISTORY  Family History   Problem Relation Age of Onset    Heart Disease Mother     Heart Failure Mother        CURRENT MEDICATIONS  Reviewed.  See Encounter Summary.     ALLERGIES  No Known Allergies    PHYSICAL EXAM  VITAL SIGNS: /77   Pulse (!) 51   Temp 36.7 °C (98.1 °F) (Temporal)   Resp 18   Ht 1.727 m (5' 8\")   Wt 65.8 kg (145 lb)   SpO2 98%   BMI 22.05 kg/m²   Constitutional: Awake, alert in no apparent distress.  HENT: Normocephalic, Bilateral external ears normal. Nose normal.  No midline tenderness or step-off.  Eyes: Conjunctiva normal, non-icteric, EOMI.    Thorax & Lungs: Easy unlabored respirations, Clear to ascultation bilaterally.  Cardiovascular: Regular rate, Regular rhythm, No murmurs, rubs or gallops. Bilateral pulses symmetrical.   Abdomen:  Soft, nontender, nondistended, normal active bowel sounds.   :    Skin: Visualized skin is  Dry, No erythema, No rash.   Musculoskeletal:   No cyanosis, clubbing or edema. No leg asymmetry.  Pelvis is stable.  Good range of motion of the hips.  There is some firmness to the soft tissue in the right iliotibial region, remaining compartments are soft, no pain with passive range of motion.  Neurologic: Alert, Grossly non-focal.   Psychiatric: Normal affect, Normal mood  Lymphatic:      RADIOLOGY  I have independently interpreted the diagnostic imaging associated with this visit and am waiting the final reading from the radiologist.   My preliminary interpretation is as follows: Fullness noted over the right thigh, query hematoma    Radiologist interpretation:   CT-PELVIS W/O PLUS RECONS   Final Result         1.  Periprosthetic fracture adjacent to right hip arthroplasty femoral component.   2.  " Intermediate density and enlargement of the anterior proximal thigh musculature, appearance most compatible with fracture hematoma. Evaluation and monitoring for development of compartment syndrome recommended as clinically appropriate.   3.  Atherosclerosis          COURSE & MEDICAL DECISION MAKING  Pertinent Labs & Imaging studies reviewed. (See chart for details)    COURSE & MEDICAL DECISION MAKING  Pertinent Labs & Imaging studies reviewed. (See chart for details)    Differential diagnoses include but are not limited to: Thigh hematoma, sacral contusion, fracture, pelvic fracture    6:36 PM - Nursing notes reviewed, patient seen and examined at bedside.    ED Observation Status? Yes; I am placing the patient in to an observation status due to a diagnostic uncertainty as well as therapeutic intensity. Patient placed in observation status at 6:45 PM    Observation plan is as follows: Pain control, CT, escalate as needed    Upon Reevaluation, the patient's condition has: Pain has improved oxycodone x 2 however he does have a periprosthetic fracture and will need to be admitted.    Patient discharged from ED Observation status at 11/29/2023 9:05 PM     Discussion of management with other medical personnel: Discussed the case with Dr. Alaniz, orthopedic surgeon.  He recommends hospitalization for pain control, will have the joint team take a look tomorrow to see if he requires any surgery.  Case discussed with Dr. Talbert, hospitalist will evaluate the patient for admission.    Decision tools and prescription drugs considered including, but not limited to: Nexus criteria negative    Decision Making:  This is a pleasant 73 y.o. year old male who presents with right buttock/hip pain after a significant fall.  The patient has a periprosthetic fracture on the right greater trochanteric region.  There is also a hematoma adjacent to the fracture.  At this time he does not have any signs concerning for compartment  syndrome.  He has an aspirin which does increase his risk though I still think he is overall quite low risk for compartment syndrome.  Plan will be to admit the patient for pain control, have orthopedics consult.  Will defer weightbearing status until cleared by orthopedics.        FINAL IMPRESSION  1. Fall, initial encounter    2. Periprosthetic fracture around internal prosthetic right hip joint, initial encounter ([facility])

## 2023-11-30 NOTE — ASSESSMENT & PLAN NOTE
-Inpatient status on medical floor.  -Patient fell from the roof, about 8 feet high.  -Initial right hip arthroplasty was done in August 2023.  -CT imaging showing periprostatic fracture adjacent to the right hip arthropathy of femoral component.  -I appreciate orthopedic consult and recommendations.  ERP discussed with Dr. Alaniz who will arrange for formal consult in the morning.  -Patient will remain NPO.  Close monitoring overnight with pain control as he also has hematoma component and has higher risk to develop compartment syndrome.  -At the time of my evaluation, his pain is well controlled and clinically not showing evidence of compartment syndrome at this time.

## 2023-11-30 NOTE — THERAPY
11/30/23 1450   Initial Contact Note    Initial Contact Note Order Received and Verified, Physical Therapy Evaluation in Progress with Full Report to Follow.   Interdisciplinary Plan of Care Collaboration   IDT Collaboration with  Nursing   Collaboration Comments PT orders received. Pt not appropriate at this time, Pt confused and combative. Will reattempt as able.   Session Information   Date / Session Number  11/30/23 Attempted (EVAL)

## 2023-11-30 NOTE — THERAPY
Occupational Therapy Contact Note    Patient Name: Vasile Bowers  Age:  73 y.o., Sex:  male  Medical Record #: [id number]  Today's Date: 11/30/2023 11/30/23 0842   Initial Contact Note    Initial Contact Note Order Received and Verified, Occupational Therapy Evaluation in Progress with Full Report to Follow.   Interdisciplinary Plan of Care Collaboration   IDT Collaboration with  Nursing   Collaboration Comments OT orders rec'd, HOLD pending surgery for hip fracture.

## 2023-11-30 NOTE — HOSPITAL COURSE
Patient is a 73-year-old male who presented after a fall from 8 foot height.  He continued to have significant pain after the fall and he has been unable to walk after the event.  Has been having increasing swelling in the left thigh.  CT scan shows a periprosthetic fracture adjacent to the right hip arthroplasty with femoral component and intermediate density enlargement of the anterior proximal thigh musculature compatible with hematoma.  Patient was seen by orthopedic surgery first thing this morning with Dr. Alaniz who deferred to Dr. Clement and patient stated he received a phone call from Dr. Clement that he be going to the OR at 4.

## 2023-11-30 NOTE — ASSESSMENT & PLAN NOTE
-Patient fell from the roof, about 8 feet high.  -Initial right hip arthroplasty was done in August 2023.  -CT imaging showing periprostatic fracture adjacent to the right hip arthropathy of femoral component.  - Dr. Clement took patient to OR 11/30 ORIF right intertrochanteric femur fracture with revision total hip arthroplasty  PT evaluation reccommending home health and wheelchair on discharge - ordered.

## 2023-11-30 NOTE — ED NOTES
Medication history reviewed with patient. Med rec is complete.   Allergies reviewed.   Patient denied any outpatient antibiotics in the last 30 days.   Anticoagulants (rivaroxaban, apixaban, edoxaban, dabigatran, enoxaparin) taken in the last 14 days? No      Mary Grace Trujillo, NarcisaD

## 2023-11-30 NOTE — CARE PLAN
The patient is Stable - Low risk of patient condition declining or worsening    Shift Goals  Clinical Goals: pain relief, NPO, plan for surgery  Patient Goals: pain relief  Family Goals: not present    Progress made toward(s) clinical / shift goals:    Problem: Pain - Standard  Goal: Alleviation of pain or a reduction in pain to the patient’s comfort goal  Outcome: Progressing  Note: Patient with severe hip pain, relief with PRN medication, see MAR.  Patient non-weight bearing R leg. Ice, heat, positioning addressed for pain.      Problem: Knowledge Deficit - Standard  Goal: Patient and family/care givers will demonstrate understanding of plan of care, disease process/condition, diagnostic tests and medications  Outcome: Progressing  Note: Patient and spouse involved in and agreeable to plan of care.  Asks questions and verbalized understanding.      Problem: Fall Risk  Goal: Patient will remain free from falls  Outcome: Progressing  Note: Patient non-weight bearing, unable to ambulate or mobilize.  Bed low and locked, call light and belongings within reach. Hourly rounding in place.       Patient is not progressing towards the following goals: n/a

## 2023-11-30 NOTE — THERAPY
11/30/23 1450   Initial Contact Note    Initial Contact Note Order Received and Verified, Physical Therapy Evaluation in Progress with Full Report to Follow.   Interdisciplinary Plan of Care Collaboration   IDT Collaboration with  Nursing   Collaboration Comments PT orders received. Pending Surgery Will reattempt as able. OSKAR   Session Information   Date / Session Number  11/30/23 Attempted (EVAL)

## 2023-12-01 ENCOUNTER — APPOINTMENT (OUTPATIENT)
Dept: RADIOLOGY | Facility: MEDICAL CENTER | Age: 73
DRG: 522 | End: 2023-12-01
Attending: INTERNAL MEDICINE
Payer: MEDICARE

## 2023-12-01 PROBLEM — M25.521 RIGHT ELBOW PAIN: Status: ACTIVE | Noted: 2023-12-01

## 2023-12-01 LAB
ANION GAP SERPL CALC-SCNC: 10 MMOL/L (ref 7–16)
BUN SERPL-MCNC: 24 MG/DL (ref 8–22)
CALCIUM SERPL-MCNC: 8.1 MG/DL (ref 8.4–10.2)
CHLORIDE SERPL-SCNC: 102 MMOL/L (ref 96–112)
CK SERPL-CCNC: 545 U/L (ref 0–154)
CO2 SERPL-SCNC: 23 MMOL/L (ref 20–33)
CREAT SERPL-MCNC: 0.95 MG/DL (ref 0.5–1.4)
ERYTHROCYTE [DISTWIDTH] IN BLOOD BY AUTOMATED COUNT: 44 FL (ref 35.9–50)
GFR SERPLBLD CREATININE-BSD FMLA CKD-EPI: 85 ML/MIN/1.73 M 2
GLUCOSE SERPL-MCNC: 160 MG/DL (ref 65–99)
HCT VFR BLD AUTO: 28.3 % (ref 42–52)
HGB BLD-MCNC: 9.9 G/DL (ref 14–18)
MCH RBC QN AUTO: 33.2 PG (ref 27–33)
MCHC RBC AUTO-ENTMCNC: 35 G/DL (ref 32.3–36.5)
MCV RBC AUTO: 95 FL (ref 81.4–97.8)
PLATELET # BLD AUTO: 135 K/UL (ref 164–446)
PMV BLD AUTO: 10.1 FL (ref 9–12.9)
POTASSIUM SERPL-SCNC: 4.8 MMOL/L (ref 3.6–5.5)
RBC # BLD AUTO: 2.98 M/UL (ref 4.7–6.1)
SODIUM SERPL-SCNC: 135 MMOL/L (ref 135–145)
WBC # BLD AUTO: 6.9 K/UL (ref 4.8–10.8)

## 2023-12-01 PROCEDURE — 85027 COMPLETE CBC AUTOMATED: CPT

## 2023-12-01 PROCEDURE — 73070 X-RAY EXAM OF ELBOW: CPT | Mod: RT

## 2023-12-01 PROCEDURE — 82550 ASSAY OF CK (CPK): CPT

## 2023-12-01 PROCEDURE — 97535 SELF CARE MNGMENT TRAINING: CPT

## 2023-12-01 PROCEDURE — 700102 HCHG RX REV CODE 250 W/ 637 OVERRIDE(OP): Performed by: INTERNAL MEDICINE

## 2023-12-01 PROCEDURE — 80048 BASIC METABOLIC PNL TOTAL CA: CPT

## 2023-12-01 PROCEDURE — A9270 NON-COVERED ITEM OR SERVICE: HCPCS | Performed by: INTERNAL MEDICINE

## 2023-12-01 PROCEDURE — 99232 SBSQ HOSP IP/OBS MODERATE 35: CPT | Performed by: INTERNAL MEDICINE

## 2023-12-01 PROCEDURE — 700111 HCHG RX REV CODE 636 W/ 250 OVERRIDE (IP): Mod: JZ | Performed by: ORTHOPAEDIC SURGERY

## 2023-12-01 PROCEDURE — 97162 PT EVAL MOD COMPLEX 30 MIN: CPT

## 2023-12-01 PROCEDURE — A9270 NON-COVERED ITEM OR SERVICE: HCPCS | Performed by: ORTHOPAEDIC SURGERY

## 2023-12-01 PROCEDURE — 36415 COLL VENOUS BLD VENIPUNCTURE: CPT

## 2023-12-01 PROCEDURE — 94760 N-INVAS EAR/PLS OXIMETRY 1: CPT

## 2023-12-01 PROCEDURE — 770001 HCHG ROOM/CARE - MED/SURG/GYN PRIV*

## 2023-12-01 PROCEDURE — 700102 HCHG RX REV CODE 250 W/ 637 OVERRIDE(OP): Performed by: ORTHOPAEDIC SURGERY

## 2023-12-01 RX ORDER — ATORVASTATIN CALCIUM 40 MG/1
80 TABLET, FILM COATED ORAL EVERY EVENING
Status: DISCONTINUED | OUTPATIENT
Start: 2023-12-01 | End: 2023-12-04 | Stop reason: HOSPADM

## 2023-12-01 RX ORDER — GABAPENTIN 300 MG/1
300 CAPSULE ORAL 2 TIMES DAILY
Status: DISCONTINUED | OUTPATIENT
Start: 2023-12-01 | End: 2023-12-04 | Stop reason: HOSPADM

## 2023-12-01 RX ORDER — GABAPENTIN 300 MG/1
300 CAPSULE ORAL 2 TIMES DAILY
Status: DISCONTINUED | OUTPATIENT
Start: 2023-12-01 | End: 2023-12-01

## 2023-12-01 RX ADMIN — HYDROMORPHONE HYDROCHLORIDE 0.5 MG: 1 INJECTION, SOLUTION INTRAMUSCULAR; INTRAVENOUS; SUBCUTANEOUS at 02:10

## 2023-12-01 RX ADMIN — OXYCODONE HYDROCHLORIDE 10 MG: 10 TABLET ORAL at 00:55

## 2023-12-01 RX ADMIN — GABAPENTIN 300 MG: 300 CAPSULE ORAL at 12:54

## 2023-12-01 RX ADMIN — OXYCODONE HYDROCHLORIDE 10 MG: 10 TABLET ORAL at 19:27

## 2023-12-01 RX ADMIN — ASPIRIN 81 MG: 81 TABLET, COATED ORAL at 05:04

## 2023-12-01 RX ADMIN — ASPIRIN 81 MG: 81 TABLET, COATED ORAL at 17:04

## 2023-12-01 RX ADMIN — OXYCODONE HYDROCHLORIDE 10 MG: 10 TABLET ORAL at 04:08

## 2023-12-01 RX ADMIN — OXYCODONE HYDROCHLORIDE 10 MG: 10 TABLET ORAL at 08:47

## 2023-12-01 RX ADMIN — OXYCODONE HYDROCHLORIDE 10 MG: 10 TABLET ORAL at 12:54

## 2023-12-01 RX ADMIN — DOCUSATE SODIUM 100 MG: 100 CAPSULE, LIQUID FILLED ORAL at 17:04

## 2023-12-01 RX ADMIN — ATORVASTATIN CALCIUM 80 MG: 40 TABLET, FILM COATED ORAL at 17:04

## 2023-12-01 RX ADMIN — DOCUSATE SODIUM 100 MG: 100 CAPSULE, LIQUID FILLED ORAL at 05:04

## 2023-12-01 RX ADMIN — DOCUSATE SODIUM 50MG AND SENNOSIDES 8.6MG 1 TABLET: 8.6; 5 TABLET, FILM COATED ORAL at 21:00

## 2023-12-01 ASSESSMENT — ENCOUNTER SYMPTOMS
MYALGIAS: 1
FEVER: 0
DEPRESSION: 0
VOMITING: 0
CLAUDICATION: 0
WEAKNESS: 0
DIARRHEA: 0
CHILLS: 0
ABDOMINAL PAIN: 0
DIZZINESS: 0
HEADACHES: 0
SENSORY CHANGE: 0
PHOTOPHOBIA: 0
SPEECH CHANGE: 0
BLURRED VISION: 0
SHORTNESS OF BREATH: 0
HEARTBURN: 0
COUGH: 0
INSOMNIA: 0
CONSTIPATION: 0
NERVOUS/ANXIOUS: 0

## 2023-12-01 ASSESSMENT — PAIN DESCRIPTION - PAIN TYPE
TYPE: ACUTE PAIN
TYPE: SURGICAL PAIN

## 2023-12-01 ASSESSMENT — COGNITIVE AND FUNCTIONAL STATUS - GENERAL
MOBILITY SCORE: 14
CLIMB 3 TO 5 STEPS WITH RAILING: A LOT
STANDING UP FROM CHAIR USING ARMS: A LOT
TURNING FROM BACK TO SIDE WHILE IN FLAT BAD: A LITTLE
MOVING FROM LYING ON BACK TO SITTING ON SIDE OF FLAT BED: A LITTLE
SUGGESTED CMS G CODE MODIFIER MOBILITY: CL
WALKING IN HOSPITAL ROOM: A LOT
MOVING TO AND FROM BED TO CHAIR: A LOT

## 2023-12-01 ASSESSMENT — GAIT ASSESSMENTS
GAIT LEVEL OF ASSIST: MINIMAL ASSIST
ASSISTIVE DEVICE: FRONT WHEEL WALKER
DISTANCE (FEET): 5
DEVIATION: DECREASED BASE OF SUPPORT;STEP TO;BRADYKINETIC

## 2023-12-01 ASSESSMENT — PAIN SCALES - GENERAL: PAIN_LEVEL: 2

## 2023-12-01 NOTE — CARE PLAN
The patient is Stable - Low risk of patient condition declining or worsening    Shift Goals  Clinical Goals: ambulate, PT/OT, pain control  Patient Goals: discharge, ambulate  Family Goals: plan of care, communication    Progress made toward(s) clinical / shift goals:    Problem: Pain - Standard  Goal: Alleviation of pain or a reduction in pain to the patient’s comfort goal  Outcome: Progressing  Note: Patient requiring PRN pain medication, administered per MAR.  Encouraged to ask for pain meds prior to severe pain and prior to working with therapies. Repositions self well, up to chair at lunch.       Problem: Knowledge Deficit - Standard  Goal: Patient and family/care givers will demonstrate understanding of plan of care, disease process/condition, diagnostic tests and medications  Outcome: Progressing  Note: Patient and family involved in and agreeable to plan of care.  Ask questions and verbalizes understanding.      Problem: Fall Risk  Goal: Patient will remain free from falls  Outcome: Progressing  Note: Bed low and locked, alarms on.  Call light and belongings within reach. Hourly rounding in place.         Patient is not progressing towards the following goals: n/a

## 2023-12-01 NOTE — DISCHARGE PLANNING
Case Management Discharge Planning    Admission Date: 11/29/2023  GMLOS: 3.9  ALOS: 2    6-Clicks ADL Score: 23  6-Clicks Mobility Score: 14  PT and/or OT Eval ordered: Yes  Post-acute Referrals Ordered: Yes  Post-acute Choice Obtained: Yes  Has referral(s) been sent to post-acute provider:  Yes      Anticipated Discharge Dispo: Discharge Disposition: D/T to home under HHA care in anticipation of covered skilled care (06)    DME Needed: No    Action(s) Taken: Choice obtained    Escalations Completed: None    Medically Clear: No    Barriers to Discharge: Medical clearance, Home Health Referral    Course of Action  **1423  LSW spoke to patient at bedside. Physical therapy is recommending home health services. Patient reports he is agreeable to this plan and is open to any agency that takes his insurance. Home health referral has not been placed at this time. Choice form: [facility] (1), [facility] (2), [facility] (3).

## 2023-12-01 NOTE — ANESTHESIA PROCEDURE NOTES
Airway    Date/Time: 11/30/2023 5:49 PM    Performed by: Kevin Hills D.O.  Authorized by: Kevin Hills D.O.    Location:  OR  Urgency:  Elective  Indications for Airway Management:  Anesthesia      Spontaneous Ventilation: absent    Sedation Level:  Deep  Preoxygenated: Yes    Patient Position:  Sniffing  Mask Difficulty Assessment:  1 - vent by mask  Final Airway Type:  Endotracheal airway  Final Endotracheal Airway:  ETT  Cuffed: Yes    Technique Used for Successful ETT Placement:  Direct laryngoscopy    Insertion Site:  Oral  Blade Type:  Amy  Laryngoscope Blade/Videolaryngoscope Blade Size:  4  ETT Size (mm):  8.0  Measured from:  Teeth  ETT to Teeth (cm):  22  Placement Verified by: auscultation and capnometry    Cormack-Lehane Classification:  Grade I - full view of glottis  Number of Attempts at Approach:  1

## 2023-12-01 NOTE — OP REPORT
DATE OF SERVICE: 11/30/23    PREOPERATIVE DIAGNOSES:  1. right hip intertrochanteric femur fracture with total hip arthroplasty     POSTOPERATIVE DIAGNOSES:  1. Right hip intertrochanteric femur fracture with total hip arthroplasty    PROCEDURE PERFORMED: ORIF right intertrochanteric femur fracture with revision total hip arthroplasty stem    SURGEON: Clark Clement MD.     ASSISTANT: AKIRA Kurtz    ANESTHESIA: General.     ANESTHESIOLOGIST:     ANTIBIOTICS: Ancef and Vancomycin.     IMPLANTS: [employer] Modular Hip Stem 15 x 155, 21 +10 body, 36 +2.5 ceramic head, Trochanteric claw plate with 3 cables    COMPLICATIONS: None    BLOOD LOSS: 200    INDICATIONS: The patient presents with a proximal femur fracture and a evelyn with possible loose stem. The risks of the procedure as well as the length of the procedure, length of the incision, and depth of the procedure, in addition to the risks, benefits and options were discussed in detail and are outlined in the consent in the chart. The patient elected to proceed with the proposed procedure.     DESCRIPTION OF PROCEDURE: The patient was properly identified in the preoperative holding room and the right hip was marked as the correct surgical site. The patient was taken to the operative suite and placed in supine on the operative table. The patient underwent general anesthesia. After review of allergies, antibiotics were administered and a time out was called. The patient was positioned in the lateral decubitus position with the right side up. The right hip and lower extremity was sterilely prepped and draped in standard fashion. An incision was made for a posterior lateral approach. Retractors were placed. Fascial layers were split.  The hip was dislocated.  There was a large hematoma.  The cup was well fixed.  The liner looked excellent.  The proximal femur was exposed.  This stem was removed.  This was then reamed up to a size 15.  The 15 modular hip  stem was placed.  This is then prepared with the proximal body in the trial with a +10 was used.  This had excellent motion and stability.  The 21+10 body was then inserted.  This was trialed and the 36+2.5 had had excellent leg lengths and stability.  The final ceramic head was used.  This was reduced.  The fracture was identified this was reduced with a pointed reduction clamp and then the trochanteric claw plate was placed.  Cables were placed throughout.  This achieved anatomic reduction.  This was then irrigated and closed. It was soaked with Betadine and saline, closed with # 2 Quill for the fascial layer, reinforced with # 1 Vicryl, followed by 2-0 Vicryl and staples on the skin. All counts were correct. Soft dressings were applied. The patient was extubated and transferred to the recovery room in stable condition.

## 2023-12-01 NOTE — CARE PLAN
The patient is Stable - Low risk of patient condition declining or worsening    Shift Goals  Clinical Goals: pain management  Patient Goals: pain mgmt  Family Goals: not present    Progress made toward(s) clinical / shift goals:    Problem: Pain - Standard  Goal: Alleviation of pain or a reduction in pain to the patient’s comfort goal  12/1/2023 0431 by Zuleyma Burks, R.N.  Outcome: Progressing  12/1/2023 0421 by Zuleyma Burks R.N.  Outcome: Progressing  Flowsheets (Taken 12/1/2023 0408)  Pain Rating Scale (NPRS): 7     Problem: Knowledge Deficit - Standard  Goal: Patient and family/care givers will demonstrate understanding of plan of care, disease process/condition, diagnostic tests and medications  12/1/2023 0431 by Zuleyma Burks, R.N.  Outcome: Progressing  12/1/2023 0421 by Zuleyma Burks, R.N.  Outcome: Progressing     Problem: Fall Risk  Goal: Patient will remain free from falls  Outcome: Progressing       Patient is not progressing towards the following goals:

## 2023-12-01 NOTE — THERAPY
Physical Therapy   Initial Evaluation     Patient Name: Vasile Bowers  Age:  73 y.o., Sex:  male  Medical Record #: [id number]  Today's Date: 12/1/2023     Precautions  Precautions: (P) Fall Risk;Toe Touch Weight Bearing Right Lower Extremity    Assessment    Patient is a 73 y.o. male who presented with right hip pain secondary to a fall yesterday and intertroch femur fracture.  s/p evelyn earlier this year  REVISION, TOTAL ARTHROPLASTY, HIP, ORIF GREATER TROCHANTER. TTWB RLE  Pt is limited by c/o of dizziness ( H& H is trending down) , pain, weakness and anxiety. Pt able to perform bed mob and transfers with min assist limited amb. Anticipate pt will do well and return home with spouse and HHPT. Pt will need a couple of days. Has 2 steps to enter home.  See below flowsheet for details.      Plan    Physical Therapy Initial Treatment Plan   Treatment Plan : (P) Bed Mobility, Neuro Re-Education / Balance, Gait Training, Self Care / Home Evaluation, Stair Training, Therapeutic Activities, Therapeutic Exercise  Treatment Frequency: (P) 5 Times per Week  Duration: (P) Until Therapy Goals Met    DC Equipment Recommendations: (P) None  Discharge Recommendations: (P) Recommend home health for continued physical therapy services              12/01/23 1337   Charge Group   PT Evaluation PT Evaluation Mod   PT Self Care / Home Evaluation (Units) 1   Total Time Spent   PT Evaluation Time Spent (Mins) 30   PT Self Care/Home Evaluation Time Spent (Mins) 15    Services   Is patient using  services for this encounter? No   Initial Contact Note    Initial Contact Note Order Received and Verified, Physical Therapy Evaluation in Progress with Full Report to Follow.   Precautions   Precautions Fall Risk;Toe Touch Weight Bearing Right Lower Extremity   Pain 0 - 10 Group   Location Hip   Location Orientation Right   Therapist Pain Assessment 7;Nurse Notified;Post Activity Pain Same as Prior to Activity   Prior  Living Situation   Prior Services None;Home-Independent   Housing / Facility 1 Story House   Steps Into Home 2   Steps In Home 0   Equipment Owned Front-Wheel Walker   Lives with - Patient's Self Care Capacity Spouse   Prior Level of Functional Mobility   Bed Mobility Independent   Transfer Status Independent   Ambulation Independent   Ambulation Distance community, active   Assistive Devices Used None   Stairs Independent   Comments pt does sleep in recliner at times   History of Falls   History of Falls Yes   Date of Last Fall 11/29/23  (reason for admit)   Cognition    Cognition / Consciousness WDL   Comments fearful of pain, anxious   Passive ROM Lower Body   Passive ROM Lower Body X   Comments R hip NT   Active ROM Lower Body    Active ROM Lower Body  X   Comments R hip limited by pain   Strength Lower Body   Lower Body Strength  X   Comments R LE limited by pain and weakness   Sensation Lower Body   Lower Extremity Sensation   WDL   Lower Body Muscle Tone   Lower Body Muscle Tone  WDL   Neurological Concerns   Neurological Concerns No   Coordination Lower Body    Coordination Lower Body  WDL   Vision   Vision Comments pt kept closing eyes during mobility  due to pain   Other Treatments   Other Treatments Provided quad sets, bed mob, wt bearing, use of FWW. Spoke to spouse regarding therapy goals   Balance Assessment   Sitting Balance (Static) Fair +   Sitting Balance (Dynamic) Fair   Standing Balance (Static) Fair   Standing Balance (Dynamic) Fair -   Weight Shift Sitting Fair   Weight Shift Standing Poor   Comments with FWW   Bed Mobility    Supine to Sit Moderate Assist   Sit to Supine   (pt left up in chair)   Scooting Contact Guard Assist   Gait Analysis   Gait Level Of Assist Minimal Assist   Assistive Device Front Wheel Walker   Distance (Feet) 5   # of Times Distance was Traveled 1   Deviation Decreased Base Of Support;Step To;Bradykinetic   # of Stairs Climbed 0   Weight Bearing Status TTWB RLE    Comments VC's to sequence using FWW, hand placment and wt bearing. VC's to keep eyes open, c/o of dizziness   Functional Mobility   Sit to Stand Minimal Assist   Bed, Chair, Wheelchair Transfer Minimal Assist   How much difficulty does the patient currently have...   Turning over in bed (including adjusting bedclothes, sheets and blankets)? 3   Sitting down on and standing up from a chair with arms (e.g., wheelchair, bedside commode, etc.) 3   Moving from lying on back to sitting on the side of the bed? 2   How much help from another person does the patient currently need...   Moving to and from a bed to a chair (including a wheelchair)? 2   Need to walk in a hospital room? 2   Climbing 3-5 steps with a railing? 2   6 clicks Mobility Score 14   Activity Tolerance   Sitting in Chair 1 hour   Sitting Edge of Bed 10 mins   Standing 4 mins   Edema / Skin Assessment   Edema / Skin  Not Assessed   Short Term Goals    Short Term Goal # 1 in 6 V pt will perform bed mob with flat bed and no rail with CGA   Short Term Goal # 2 in 6 V pt will transfer using CGA with FWW to various surfaces   Short Term Goal # 3 in 6 V pt will amb using FWW and TTWB x 100 feet with SBA   Short Term Goal # 4 in 6 V pt will climb up and down 2 step with sBA B rail   Education Group   Education Provided Use of Assistive Device;Weight Bearing Status;Exercises - Supine;Exercises - Seated   Use of Assistive Device Patient Response Patient;Acceptance;Explanation;Verbal Demonstration   Exercises - Supine Patient Response Patient;Acceptance;Explanation;Verbal Demonstration;Action Demonstration   Exercises - Seated Patient Response Patient;Acceptance;Explanation;Verbal Demonstration;Action Demonstration   Weight Bearing Status Patient Response Patient;Acceptance;Explanation;Verbal Demonstration   Physical Therapy Initial Treatment Plan    Treatment Plan  Bed Mobility;Neuro Re-Education / Balance;Gait Training;Self Care / Home Evaluation;Stair  Training;Therapeutic Activities;Therapeutic Exercise   Treatment Frequency 5 Times per Week   Duration Until Therapy Goals Met   Problem List    Problems Pain;Impaired Bed Mobility;Impaired Transfers;Impaired Ambulation;Functional Strength Deficit;Decreased Activity Tolerance;Limited Knowledge of Post-Op Precautions   Anticipated Discharge Equipment and Recommendations   DC Equipment Recommendations None   Discharge Recommendations Recommend home health for continued physical therapy services   Interdisciplinary Plan of Care Collaboration   IDT Collaboration with  Nursing   Patient Position at End of Therapy Seated;Chair Alarm On;Phone within Reach;Tray Table within Reach;Call Light within Reach   Collaboration Comments re: eval   Session Information   Date / Session Number  12/1 -1 ( 1/5, 12/7)

## 2023-12-01 NOTE — OR NURSING
1919: Patient arrived from OR via bed.  Surgical dressing on right hip CDI; pedal pulse +2. Cold pack in place, no complaints of pain or nausea at this time.     Sedation/Resp Status: Non responsive to verbal with OPA in place.  Respirations spontaneous and non-labored.    HR 65SR; VSS on 6L 02 via simple mask.     1930: OPA out for return of spontaneous eye opening/gag reflex - respirations continue spontaneous and non-labored.     1934: Surgical dressing CDI, no complaints of nausea at this time, VSS. Complains of 8 out of 10 pain, medicated per MAR order.     1945: Medicated per MAR order for 8 out of 10 pain.     1949: Surgical dressing CDI, no complaints of nausea at this time, VSS. Complains of 8 out of 10 pain.    2019: Surgical dressing CDI, no complaints of nausea at this time, VSS. Complains of 8 out of 10 pain.    2049: Surgical dressing CDI, no complaints of nausea at this time, VSS. Complains of 7 out of 10 pain.    2115: Report called to Zuleyma UPTON.     2122: Patient transferred to the floor by Rosey UPTON and Michael UPTON.

## 2023-12-01 NOTE — ANESTHESIA POSTPROCEDURE EVALUATION
Patient: Vasile Bowers    Procedure Summary       Date: 11/30/23 Room / Location:  OR  / SURGERY [facility]    Anesthesia Start: 1743 Anesthesia Stop: 1926    Procedure: REVISION, TOTAL ARTHROPLASTY, HIP, ORIF GREATER TROCHANTER (Right: Hip) Diagnosis: (Periprosthetic fracture adjacent to right hip arthroplasty femoral component.)    Surgeons: Clark Clement M.D. Responsible Provider: Kevin Hills D.O.    Anesthesia Type: general ASA Status: 3            Final Anesthesia Type: general  Last vitals  BP   Blood Pressure : 111/73    Temp   36.4 °C (97.5 °F)    Pulse   85   Resp   16    SpO2   97 %      Anesthesia Post Evaluation    Patient location during evaluation: PACU  Patient participation: complete - patient participated  Level of consciousness: awake and alert  Pain score: 2    Airway patency: patent  Anesthetic complications: no  Cardiovascular status: hemodynamically stable  Respiratory status: acceptable  Hydration status: euvolemic    PONV: none          There were no known notable events for this encounter.     Nurse Pain Score: 3 (NPRS)

## 2023-12-01 NOTE — PROGRESS NOTES
4 Eyes Skin Assessment Completed by ALEXSANDRA Amor and ALEXSANDRA Michaels.    Head WDL  Ears WDL  Nose WDL  Mouth WDL  Neck WDL  Breast/Chest; displaced clavical since 1990's per patient  Shoulder Blades WDL  Spine WDL  (R) Arm/Elbow/Hand Scab  (L) Arm/Elbow/Hand Scab; left shoulder scab  Abdomen WDL  Groin WDL  Scrotum/Coccyx/Buttocks WDL  (R) Leg Scab and Bruising, R hip aquacel surgical dressing  (L) Leg Scab, knee scar  (R) Heel/Foot/Toe WDL  (L) Heel/Foot/Toe WDL          Devices In Places Pulse Ox and Nasal Cannula      Interventions In Place Gray Ear Foams and Pillows    Possible Skin Injury No    Pictures Uploaded Into Epic yes  Wound Consult Placed N/A  RN Wound Prevention Protocol Ordered No

## 2023-12-01 NOTE — OR NURSING
1620: Rc report from ALEXSANDRA Nicolas and assumed care. Pt resting comfortably in the bed, pain is tolerable, no nausea, awake and alert, tolerating room air. Pictures taken of his cuts on the hands and arms. Patient allergies and NPO status verified, home medication reconciliation completed and belongings secured. Patient verbalizes understanding of pain scale, expected course of stay and plan of care. Surgical site verified with patient. IPIV patency verified. Sequentials placed on legs.

## 2023-12-01 NOTE — H&P
Surgery Orthopedic History & Physical Note    Date  11/30/2023    Primary Care Physician  Brandon Magallanes D.O.    CC  * No Diagnosis Codes entered *    HPI  This is a 73 y.o. male who presented with right hip pain secondary to a fall yesterday and intertroch femur fracture.  s/p evelyn earlier this year.      Past Medical History:   Diagnosis Date    Ascending aorta dilation (HCC)     CAD (coronary artery disease)     Chronic pain     High cholesterol     Myocardial infarct (HCC)        Past Surgical History:   Procedure Laterality Date    CT TOTAL HIP ARTHROPLASTY Right 8/14/2023    Procedure: RIGHT TOTAL HIP ARTHROPLASTY;  Surgeon: Clark Clement M.D.;  Location: [facility];  Service: Orthopedics    PB INJECT RX OTHER PERIPH NERVE Right 5/30/2023    Procedure: RIGHT hip acetabular neurotomy of the femoral and obturator sensory nerves with fluoroscopic guidance with sedation.;  Surgeon: Brooke Brown M.D.;  Location[facility];  Service: Pain Management    CT INJ(S) NERVE BLOCK FEMORAL (INCLUDES IG) Right 5/2/2023    Procedure: Diagnostic RIGHT femoral and obturator nerve blocks with fluoroscopic guidance;  Surgeon: Brooke Brown M.D.;  Location: [facility];  Service: Pain Management    CT DRAIN/INJECT LARGE JOINT/BURSA Right 4/11/2023    Procedure: Diagnostic and therapeutic Fluoroscopically guided intra-articular RIGHT hip injection;  Surgeon: Brooke Brown M.D.;  Location: [facility];  Service: Pain Management    INJ,ANES LUMBAR/CERVICAL Right 3/21/2023    Procedure: Diagnostic medial branch blocks targeting the RIGHT L4-5 and L5-S1 facet joints with fluoroscopic guidance #1;  Surgeon: Brooke Brown M.D.;  Location: [facility];  Service: Pain Management    IRRIGATION & DEBRIDEMENT ORTHO Left 12/8/2021    Procedure: IRRIGATION AND DEBRIDEMENT, WOUND - HAND;  Surgeon: Guillermo Villalobos M.D.;  Location: SURGERY  [facility];  Service: Orthopedics    Z CARDIAC CATH  2021    PCI to OM1     KNEE ARTHROSCOPY      SHOULDER ARTHROSCOPY         Current Facility-Administered Medications   Medication Dose Route Frequency Provider Last Rate Last Admin    lactated ringers infusion   Intravenous Continuous Clark Clement M.D.        [MAR Hold] acetaminophen (Tylenol) tablet 650 mg  650 mg Oral Q6HRS PRN Becca Rock M.D.        [MAR Hold] ondansetron (Zofran) syringe/vial injection 4 mg  4 mg Intravenous Q4HRS PRN Becca Rock M.D.        [MAR Hold] ondansetron (Zofran ODT) dispertab 4 mg  4 mg Oral Q4HRS PRN Becca Rock M.D.        [MAR Hold] senna-docusate (Pericolace Or Senokot S) 8.6-50 MG per tablet 2 Tablet  2 Tablet Oral BID Becca Rock M.D.        And    [MAR Hold] polyethylene glycol/lytes (Miralax) Packet 1 Packet  1 Packet Oral QDAY PRN Becca Rock M.D.        And    [MAR Hold] magnesium hydroxide (Milk Of Magnesia) suspension 30 mL  30 mL Oral QDAY PRN Becca Rock M.D.        And    [MAR Hold] bisacodyl (Dulcolax) suppository 10 mg  10 mg Rectal QDAY PRMACIE Rock M.D.        [MAR Hold] HYDROmorphone (Dilaudid) injection 0.5 mg  0.5 mg Intravenous Q3HRS PRMACIE Rock M.D.   0.5 mg at 23 0238    [MAR Hold] HYDROmorphone (Dilaudid) injection 1 mg  1 mg Intravenous Q3HRS PRMACIE Rock M.D.   1 mg at 23 1339       Social History     Socioeconomic History    Marital status:      Spouse name: Not on file    Number of children: Not on file    Years of education: Not on file    Highest education level: Not on file   Occupational History    Not on file   Tobacco Use    Smoking status: Former     Current packs/day: 0.00     Types: Cigarettes     Quit date:      Years since quittin.9    Smokeless tobacco: Former     Types: Chew     Quit date:    Vaping Use    Vaping Use: Never used    Substance and Sexual Activity    Alcohol use: Not Currently    Drug use: No    Sexual activity: Not on file   Other Topics Concern    Not on file   Social History Narrative    Not on file     Social Determinants of Health     Financial Resource Strain: Not on file   Food Insecurity: Not on file   Transportation Needs: Not on file   Physical Activity: Not on file   Stress: Not on file   Social Connections: Not on file   Intimate Partner Violence: Not on file   Housing Stability: Not on file       Family History   Problem Relation Age of Onset    Heart Disease Mother     Heart Failure Mother        Allergies  Patient has no known allergies.    Review of Systems  Negative    Physical Exam  Lungs:  CTA bilat  CV:  RRR  Right hip:  pain proximal femur  Vital Signs  Blood Pressure : 126/74   Temperature: 37.1 °C (98.8 °F)   Pulse: (!) 53   Respiration: 16   Pulse Oximetry: 98 %       Labs:  Recent Labs     11/29/23 2114 11/30/23 0042 11/30/23  0619 11/30/23  1230   WBC 8.0 6.2  --   --    RBC 3.65* 3.59*  --   --    HEMOGLOBIN 12.1* 11.8* 11.6* 11.6*   HEMATOCRIT 33.9* 34.3* 33.0* 32.6*   MCV 92.9 95.5  --   --    MCH 33.2* 32.9  --   --    MCHC 35.7 34.4  --   --    RDW 42.7 43.4  --   --    PLATELETCT 141* 136*  --   --    MPV 9.7 9.8  --   --      Recent Labs     11/29/23 2114 11/30/23 0042   SODIUM 135 135   POTASSIUM 3.6 3.8   CHLORIDE 103 103   CO2 21 22   GLUCOSE 89 91   BUN 22 21   CREATININE 0.73 0.72   CALCIUM 8.5 8.4         Recent Labs     11/29/23 2114   ASTSGOT 28   ALTSGPT 26   TBILIRUBIN 0.7   ALKPHOSPHAT 78   GLOBULIN 2.1       Radiology:  DX-PELVIS-1 OR 2 VIEWS   Final Result         1.  Periprosthetic fracture of the right greater trochanter.      CT-PELVIS W/O PLUS RECONS   Final Result         1.  Periprosthetic fracture adjacent to right hip arthroplasty femoral component.   2.  Intermediate density and enlargement of the anterior proximal thigh musculature, appearance most compatible with  fracture hematoma. Evaluation and monitoring for development of compartment syndrome recommended as clinically appropriate.   3.  Atherosclerosis      DX-HIP-UNILATERAL-WITHOUT PELVIS-1 VIEW RIGHT    (Results Pending)   DX-PORTABLE FLUORO > 1 HOUR    (Results Pending)         Assessment/Plan:  * No Diagnosis Codes entered *  Procedure(s):  REVISION, TOTAL ARTHROPLASTY, HIP, ORIF GREATER TROCHANTER

## 2023-12-01 NOTE — ANESTHESIA TIME REPORT
Anesthesia Start and Stop Event Times       Date Time Event    11/30/2023 1725 Ready for Procedure     1743 Anesthesia Start     1926 Anesthesia Stop          Responsible Staff  11/30/23      Name Role Begin End    Kevin Hills D.O. Anesth 1743 1926          Overtime Reason:  overtime    Comments:

## 2023-12-01 NOTE — CARE PLAN
The patient is Stable - Low risk of patient condition declining or worsening    Shift Goals  Clinical Goals: pain management  Patient Goals: pain mgmt  Family Goals: not present    Progress made toward(s) clinical / shift goals:    Problem: Pain - Standard  Goal: Alleviation of pain or a reduction in pain to the patient’s comfort goal  Outcome: Progressing  Flowsheets (Taken 12/1/2023 6128)  Pain Rating Scale (NPRS): 7     Problem: Knowledge Deficit - Standard  Goal: Patient and family/care givers will demonstrate understanding of plan of care, disease process/condition, diagnostic tests and medications  Outcome: Progressing       Patient is not progressing towards the following goals:

## 2023-12-01 NOTE — PROGRESS NOTES
Hospital Medicine Daily Progress Note    Date of Service  12/1/2023    Chief Complaint  Vasile Bowers is a 73 y.o. male admitted 11/29/2023 with right hip and thigh pain    Hospital Course  Patient is a 73-year-old male who presented after a fall from 8 foot height.  He continued to have significant pain after the fall and he has been unable to walk after the event.  Has been having increasing swelling in the left thigh.  CT scan shows a periprosthetic fracture adjacent to the right hip arthroplasty with femoral component and intermediate density enlargement of the anterior proximal thigh musculature compatible with hematoma.  Patient was seen by orthopedic surgery first thing this morning with Dr. Alaniz who deferred to Dr. Clement and patient stated he received a phone call from Dr. Clement that he be going to the OR at 4.    Interval Problem Update  11/30 patient continues to be in significant pain at time of examination.  He states that the hematoma in his right thigh feels a little bit softer and not quite as swollen as it was yesterday.  Patient remains n.p.o. to go to the OR later today with Dr. Clement  12/1 patient in good spirits today.  Wife at bedside.  He states that the pain is intense when he is ambulating or trying to ambulate and he is supposed to be toe-touch weightbearing right lower extremity for 6 weeks.  He worked with physical therapy today and they are recommending home health but a few more days in the hospital as he does have steps to get into his home.  He is also complaining of right elbow pain so we will get plain films of the right elbow to make sure there is no subtle deformity.  He is currently on aspirin 81 mg twice daily for DVT prophylaxis    I have discussed this patient's plan of care and discharge plan at IDT rounds today with Case Management, Nursing, Nursing leadership, and other members of the IDT team.    Consultants/Specialty  orthopedics    Code Status  Full  Code    Disposition  The patient is medically cleared for discharge to home or a post-acute facility.  Anticipate discharge to: home with organized home healthcare and close outpatient follow-up    I have placed the appropriate orders for post-discharge needs.    Review of Systems  Review of Systems   Constitutional:  Negative for chills and fever.   HENT:  Negative for congestion.    Eyes:  Negative for blurred vision and photophobia.   Respiratory:  Negative for cough and shortness of breath.    Cardiovascular:  Negative for chest pain, claudication and leg swelling.   Gastrointestinal:  Negative for abdominal pain, constipation, diarrhea, heartburn and vomiting.   Genitourinary:  Negative for dysuria and hematuria.   Musculoskeletal:  Positive for myalgias (right hip pain, right elbow pain). Negative for joint pain.   Skin:  Negative for itching and rash.   Neurological:  Negative for dizziness, sensory change, speech change, weakness and headaches.   Psychiatric/Behavioral:  Negative for depression. The patient is not nervous/anxious and does not have insomnia.         Physical Exam  Temp:  [36.2 °C (97.2 °F)-37.1 °C (98.8 °F)] 36.3 °C (97.3 °F)  Pulse:  [53-86] 73  Resp:  [13-18] 16  BP: ()/(62-80) 98/62  SpO2:  [94 %-100 %] 94 %    Physical Exam  Vitals and nursing note reviewed.   Constitutional:       General: He is not in acute distress.     Appearance: Normal appearance.   HENT:      Head: Normocephalic and atraumatic.   Eyes:      General: No scleral icterus.     Extraocular Movements: Extraocular movements intact.   Cardiovascular:      Rate and Rhythm: Normal rate and regular rhythm.      Pulses: Normal pulses.      Heart sounds: Normal heart sounds. No murmur heard.  Pulmonary:      Effort: Pulmonary effort is normal. No respiratory distress.      Breath sounds: Normal breath sounds. No wheezing, rhonchi or rales.   Abdominal:      General: Abdomen is flat. Bowel sounds are normal. There is no  distension.      Palpations: Abdomen is soft.      Tenderness: There is no rebound.   Musculoskeletal:         General: Swelling and tenderness (Left thigh swelling with hematoma) present.      Cervical back: Normal range of motion and neck supple.   Lymphadenopathy:      Cervical: No cervical adenopathy.   Skin:     Coloration: Skin is not jaundiced.      Findings: No erythema.   Neurological:      General: No focal deficit present.      Mental Status: He is alert and oriented to person, place, and time. Mental status is at baseline.      Cranial Nerves: No cranial nerve deficit.   Psychiatric:         Mood and Affect: Mood normal.         Behavior: Behavior normal.         Fluids    Intake/Output Summary (Last 24 hours) at 12/1/2023 1414  Last data filed at 12/1/2023 1330  Gross per 24 hour   Intake 1330 ml   Output 700 ml   Net 630 ml         Laboratory  Recent Labs     11/29/23 2114 11/30/23 0042 11/30/23  0619 11/30/23  1230 11/30/23  2230 12/01/23  0403   WBC 8.0 6.2  --   --   --  6.9   RBC 3.65* 3.59*  --   --   --  2.98*   HEMOGLOBIN 12.1* 11.8*   < > 11.6* 10.6* 9.9*   HEMATOCRIT 33.9* 34.3*   < > 32.6* 30.2* 28.3*   MCV 92.9 95.5  --   --   --  95.0   MCH 33.2* 32.9  --   --   --  33.2*   MCHC 35.7 34.4  --   --   --  35.0   RDW 42.7 43.4  --   --   --  44.0   PLATELETCT 141* 136*  --   --   --  135*   MPV 9.7 9.8  --   --   --  10.1    < > = values in this interval not displayed.       Recent Labs     11/29/23 2114 11/30/23 0042 12/01/23  0403   SODIUM 135 135 135   POTASSIUM 3.6 3.8 4.8   CHLORIDE 103 103 102   CO2 21 22 23   GLUCOSE 89 91 160*   BUN 22 21 24*   CREATININE 0.73 0.72 0.95   CALCIUM 8.5 8.4 8.1*                     Imaging  DX-PELVIS-1 OR 2 VIEWS   Final Result         1.  Postop cerclage wire fixation of right proximal femoral periprosthetic fracture.      DX-PELVIS-1 OR 2 VIEWS   Final Result         1.  Periprosthetic fracture of the right greater trochanter.      CT-PELVIS W/O  PLUS RECONS   Final Result         1.  Periprosthetic fracture adjacent to right hip arthroplasty femoral component.   2.  Intermediate density and enlargement of the anterior proximal thigh musculature, appearance most compatible with fracture hematoma. Evaluation and monitoring for development of compartment syndrome recommended as clinically appropriate.   3.  Atherosclerosis      DX-ELBOW-LIMITED 2- RIGHT    (Results Pending)        Assessment/Plan  * Periprosthetic fracture of hip, initial encounter- (present on admission)  Assessment & Plan  -Patient fell from the roof, about 8 feet high.  -Initial right hip arthroplasty was done in August 2023.  -CT imaging showing periprostatic fracture adjacent to the right hip arthropathy of femoral component.  - Dr. Clement took patient to OR 11/30 ORIF right intertrochanteric femur fracture with revision total hip arthroplasty stem   PT evaluation    Right elbow pain  Assessment & Plan  Pain increased after fall, Right elbow films ordered      Hyperlipidemia  Assessment & Plan  -On statins.      Acute blood loss anemia  Assessment & Plan  H&H stable    Preoperative cardiovascular examination  Assessment & Plan  -Cardiovascular risk is mild.  Patient is medically optimized for surgical interventions without additional work-up.    Traumatic hematoma of hip, right, initial encounter  Assessment & Plan  Hematoma improving daily  H/h stable  Pain management      Lumbar stenosis- (present on admission)  Assessment & Plan  Resume gabapentin         VTE prophylaxis:   SCDs/TEDs      I have performed a physical exam and reviewed and updated ROS and Plan today (12/1/2023). In review of yesterday's note (11/30/2023), there are no changes except as documented above.

## 2023-12-01 NOTE — ANESTHESIA PREPROCEDURE EVALUATION
Case: [id number] Date/Time: 11/30/23 3585    Procedure: REVISION, TOTAL ARTHROPLASTY, HIP, ORIF GREATER TROCHANTER (Right)    Location:  OR 02 / SURGERY [facility]    Surgeons: Clark Clement M.D.            Relevant Problems   CARDIAC   (positive) Ascending aorta dilation (HCC)   (positive) Coronary artery disease involving native coronary artery of native heart without angina pectoris   (positive) Essential hypertension, benign      Other   (positive) Glenohumeral arthritis, unspecified laterality   (positive) Patellar tendinitis   (positive) Sacroiliitis (HCC)       Physical Exam    Airway   Mallampati: II  TM distance: >3 FB  Neck ROM: full       Cardiovascular - normal exam  Rhythm: regular  Rate: normal  (-) murmur     Dental - normal exam           Pulmonary - normal exam  Breath sounds clear to auscultation     Abdominal    Neurological - normal exam                   Anesthesia Plan    ASA 3   ASA physical status 3 criteria: CAD/stents (> 3 months)    Plan - general       Airway plan will be ETT          Induction: intravenous    Postoperative Plan: Postoperative administration of opioids is intended.    Pertinent diagnostic labs and testing reviewed    Informed Consent:    Anesthetic plan and risks discussed with patient.    Use of blood products discussed with: patient whom consented to blood products.

## 2023-12-02 PROBLEM — R74.8 ELEVATED CPK: Status: ACTIVE | Noted: 2023-12-02

## 2023-12-02 LAB — CK SERPL-CCNC: 1108 U/L (ref 0–154)

## 2023-12-02 PROCEDURE — 97116 GAIT TRAINING THERAPY: CPT

## 2023-12-02 PROCEDURE — 99232 SBSQ HOSP IP/OBS MODERATE 35: CPT | Performed by: INTERNAL MEDICINE

## 2023-12-02 PROCEDURE — 770001 HCHG ROOM/CARE - MED/SURG/GYN PRIV*

## 2023-12-02 PROCEDURE — A9270 NON-COVERED ITEM OR SERVICE: HCPCS | Performed by: ORTHOPAEDIC SURGERY

## 2023-12-02 PROCEDURE — 94760 N-INVAS EAR/PLS OXIMETRY 1: CPT

## 2023-12-02 PROCEDURE — 36415 COLL VENOUS BLD VENIPUNCTURE: CPT

## 2023-12-02 PROCEDURE — 700102 HCHG RX REV CODE 250 W/ 637 OVERRIDE(OP): Performed by: INTERNAL MEDICINE

## 2023-12-02 PROCEDURE — 82550 ASSAY OF CK (CPK): CPT

## 2023-12-02 PROCEDURE — 97166 OT EVAL MOD COMPLEX 45 MIN: CPT

## 2023-12-02 PROCEDURE — 700105 HCHG RX REV CODE 258: Performed by: INTERNAL MEDICINE

## 2023-12-02 PROCEDURE — 97535 SELF CARE MNGMENT TRAINING: CPT

## 2023-12-02 PROCEDURE — A9270 NON-COVERED ITEM OR SERVICE: HCPCS | Performed by: INTERNAL MEDICINE

## 2023-12-02 PROCEDURE — 700102 HCHG RX REV CODE 250 W/ 637 OVERRIDE(OP): Performed by: ORTHOPAEDIC SURGERY

## 2023-12-02 RX ORDER — SODIUM CHLORIDE 9 MG/ML
INJECTION, SOLUTION INTRAVENOUS CONTINUOUS
Status: DISCONTINUED | OUTPATIENT
Start: 2023-12-02 | End: 2023-12-04 | Stop reason: HOSPADM

## 2023-12-02 RX ADMIN — OXYCODONE HYDROCHLORIDE 10 MG: 10 TABLET ORAL at 15:38

## 2023-12-02 RX ADMIN — DOCUSATE SODIUM 100 MG: 100 CAPSULE, LIQUID FILLED ORAL at 04:50

## 2023-12-02 RX ADMIN — GABAPENTIN 300 MG: 300 CAPSULE ORAL at 04:50

## 2023-12-02 RX ADMIN — SODIUM CHLORIDE: 9 INJECTION, SOLUTION INTRAVENOUS at 19:52

## 2023-12-02 RX ADMIN — OXYCODONE HYDROCHLORIDE 10 MG: 10 TABLET ORAL at 11:21

## 2023-12-02 RX ADMIN — OXYCODONE HYDROCHLORIDE 10 MG: 10 TABLET ORAL at 18:41

## 2023-12-02 RX ADMIN — OXYCODONE HYDROCHLORIDE 10 MG: 10 TABLET ORAL at 23:38

## 2023-12-02 RX ADMIN — ASPIRIN 81 MG: 81 TABLET, COATED ORAL at 04:50

## 2023-12-02 RX ADMIN — ATORVASTATIN CALCIUM 80 MG: 40 TABLET, FILM COATED ORAL at 17:39

## 2023-12-02 RX ADMIN — GABAPENTIN 300 MG: 300 CAPSULE ORAL at 17:39

## 2023-12-02 RX ADMIN — OXYCODONE HYDROCHLORIDE 10 MG: 10 TABLET ORAL at 07:26

## 2023-12-02 RX ADMIN — ASPIRIN 81 MG: 81 TABLET, COATED ORAL at 17:39

## 2023-12-02 RX ADMIN — OXYCODONE HYDROCHLORIDE 5 MG: 5 TABLET ORAL at 04:23

## 2023-12-02 RX ADMIN — DOCUSATE SODIUM 100 MG: 100 CAPSULE, LIQUID FILLED ORAL at 17:39

## 2023-12-02 RX ADMIN — SODIUM CHLORIDE: 9 INJECTION, SOLUTION INTRAVENOUS at 07:30

## 2023-12-02 RX ADMIN — OXYCODONE HYDROCHLORIDE 10 MG: 10 TABLET ORAL at 00:59

## 2023-12-02 ASSESSMENT — PAIN DESCRIPTION - PAIN TYPE
TYPE: SURGICAL PAIN

## 2023-12-02 ASSESSMENT — ENCOUNTER SYMPTOMS
HEARTBURN: 0
PHOTOPHOBIA: 0
DEPRESSION: 0
SPEECH CHANGE: 0
WEAKNESS: 0
NERVOUS/ANXIOUS: 0
INSOMNIA: 0
DIARRHEA: 0
CHILLS: 0
VOMITING: 0
MYALGIAS: 1
CONSTIPATION: 0
HEADACHES: 0
SENSORY CHANGE: 0
DIZZINESS: 0
COUGH: 0
FEVER: 0
SHORTNESS OF BREATH: 0
CLAUDICATION: 0
ABDOMINAL PAIN: 0
BLURRED VISION: 0

## 2023-12-02 ASSESSMENT — COGNITIVE AND FUNCTIONAL STATUS - GENERAL
SUGGESTED CMS G CODE MODIFIER DAILY ACTIVITY: CK
STANDING UP FROM CHAIR USING ARMS: A LITTLE
HELP NEEDED FOR BATHING: A LOT
MOVING FROM LYING ON BACK TO SITTING ON SIDE OF FLAT BED: UNABLE
SUGGESTED CMS G CODE MODIFIER MOBILITY: CL
CLIMB 3 TO 5 STEPS WITH RAILING: A LITTLE
TOILETING: A LITTLE
DAILY ACTIVITIY SCORE: 18
TURNING FROM BACK TO SIDE WHILE IN FLAT BAD: UNABLE
PERSONAL GROOMING: A LITTLE
WALKING IN HOSPITAL ROOM: A LITTLE
DRESSING REGULAR LOWER BODY CLOTHING: A LOT
MOBILITY SCORE: 12
MOVING TO AND FROM BED TO CHAIR: UNABLE

## 2023-12-02 ASSESSMENT — GAIT ASSESSMENTS
DISTANCE (FEET): 20
ASSISTIVE DEVICE: FRONT WHEEL WALKER
DISTANCE (FEET): 10
DEVIATION: STEP TO;DECREASED HEEL STRIKE;DECREASED TOE OFF
GAIT LEVEL OF ASSIST: STANDBY ASSIST

## 2023-12-02 ASSESSMENT — ACTIVITIES OF DAILY LIVING (ADL): TOILETING: INDEPENDENT

## 2023-12-02 NOTE — CARE PLAN
The patient is Stable - Low risk of patient condition declining or worsening    Shift Goals  Clinical Goals: ambulate with comfort, up to chair for meals, pain tolerable for mobility  Patient Goals: d/c home-pt verbalizes the need to stay hospitalized until monday  Family Goals: plan of care, communication    Progress made toward(s) clinical / shift goals:  in progress, pt requires increased PT education to be able to increase mobilization self.     Patient is not progressing towards the following goals:

## 2023-12-02 NOTE — CARE PLAN
The patient is Stable - Low risk of patient condition declining or worsening    Shift Goals  Clinical Goals: Pain mgt 4/10 or less, sleep 4 hours or more  Patient Goals: d/c home  Family Goals: plan of care, communication    Progress made toward(s) clinical / shift goals:  pt slept more than four hours throughout the night pt requested for pain meds 8/10 to r- hip prn pain meds given per mar. No noted n/v.     Patient is not progressing towards the following goals:

## 2023-12-02 NOTE — THERAPY
Occupational Therapy   Initial Evaluation     Patient Name: Vasile Bowers  Age:  73 y.o., Sex:  male  Medical Record #: [id number]  Today's Date: 12/2/2023     Precautions  Precautions: Fall Risk, Posterior Hip Precautions, Toe Touch Weight Bearing Right Lower Extremity    Assessment  Patient is 73 y.o. male s/p 8 ft fall though ceiling to floor below sustaining R argelia-prosthetic hip fx.  Underwent ORIF right intertrochanteric femur fracture with revision total hip arthroplasty stem on 11/30.   Pt tolerated bed mobility, basic self care tasks, ADL transfers and simple mobility with FWW in a limited fashion at this time.  Tolerated to bathroom and to chair and was limited by pain and fatigue.  Pt demo fair safety awareness with transfers and ADL's with regards to hip precautions and TTWB.  Pt is agreeable to obtaining recommended adaptive equipment for ADl's.  Pt will have assist from spouse at home.  Pt and spouse attentive to education as stated below.  Pt would benefit from home with HH therapy if he continues to progress well here vs inpt Rehab.  OT will follow.    Treatment completed this session after initial evaluation completed:  Educated pt and spouse on role of OT and Posterior Total Hip Precautions with ADL's. Reviewed application of precautions and use of Adaptive Equipment for dressing, bathing, toileting, grooming, kitchen activities and general functional mobility in the home. Reviewed the use of reacher, sockaide, long handled shoe horn and long handled sponge, as well as  BSC  to assist with ADL's as appropriate.   Reviewed step in shower transfers with TTWB technique. Provided pt with suggestions on where to obtain needed ADL items.  Educated on mgmt of waterprrof post op bandage with dressing and bathing. Pt and spouse verbalized understanding of all education provided.       Plan    Occupational Therapy Initial Treatment Plan   Treatment Interventions: Self Care / Activities of Daily Living,  "Adaptive Equipment, Neuro Re-Education / Balance, Therapeutic Exercises, Therapeutic Activity  Treatment Frequency: 4 Times per Week  Duration: Until Therapy Goals Met    DC Equipment Recommendations: Wheelchair for distance, Hand Held Shower, Sock Aide, Long Handled Shoehorn, Reacher, Other (Comments) (LH sponge (wife ordered hip kit today))  Discharge Recommendations: Recommend home health for continued occupational therapy services (if he continues to progress nicely vs Acute Rehab)     Subjective    \"I think I would like to go home, I just worry I wont be able to get around well enough.\"     Objective       12/02/23 1155   Prior Living Situation   Prior Services None;Home-Independent   Housing / Facility 1 Story House   Steps Into Home 2   Steps In Home 0   Bathroom Set up Walk In Shower   Equipment Owned Front-Wheel Walker;Bed Side Commode;Adjustable Bed Without Rails  (recliner in the bedroom, plans to use BSC over toilet or as a shower chair)   Lives with - Patient's Self Care Capacity Spouse   Comments Spouse supportive, does work W-F, but can have other family members check in on him while she is at work if needed   Prior Level of ADL Function   Self Feeding Independent   Grooming / Hygiene Independent   Bathing Independent   Dressing Independent   Toileting Independent   Prior Level of IADL Function   Medication Management Independent   Laundry Independent   Kitchen Mobility Independent   Finances Independent   Home Management Independent   Shopping Independent   Prior Level Of Mobility Independent Without Device in Community   Driving / Transportation Driving Independent   Occupation (Pre-Hospital Vocational) Employed Part Time  (Semi- retired, works in Real Estate,  Currently working on remodeling \"flipping\" a house for sale)   Leisure Interests Unable To Determine At This Time   History of Falls   History of Falls Yes   Date of Last Fall 11/29/23  (reason for admit)   Precautions   Precautions Fall " Risk;Posterior Hip Precautions;Toe Touch Weight Bearing Right Lower Extremity   Vitals   O2 Delivery Device None - Room Air   Pain 0 - 10 Group   Location Hip   Location Orientation Right   Description Aching   Therapist Pain Assessment 7;Prior to Activity;Nurse Notified  (medicated at start of session)   Cognition    Cognition / Consciousness WDL   Comments Mildly anxious, overwhelmed but reports he is calmer today about the overall situation with his medical condition.  Better able to anticipate pain and work through it   Active ROM Upper Body   Active ROM Upper Body  WDL   Dominant Hand Right   Strength Upper Body   Upper Body Strength  WDL   Comments Reports pain, fatigue in Triceps with use on walker during mobility   Coordination Upper Body   Coordination WDL   Balance Assessment   Sitting Balance (Static) Fair +   Sitting Balance (Dynamic) Fair +   Standing Balance (Static) Fair   Standing Balance (Dynamic) Fair -   Weight Shift Sitting Fair   Weight Shift Standing Fair   Comments with FWW, cues for TTWB on RLE   Bed Mobility    Supine to Sit Minimal Assist  (with HOB elevated)   Scooting Minimal Assist   ADL Assessment   Upper Body Dressing Modified Independent   Lower Body Dressing Maximal Assist  (Educ on LB dressing technique, pt too tired to attempt this session)   Toileting Minimal Assist   Comments urinal, over toilet commode   How much help from another person does the patient currently need...   Putting on and taking off regular lower body clothing? 2   Bathing (including washing, rinsing, and drying)? 2   Toileting, which includes using a toilet, bedpan, or urinal? 3   Putting on and taking off regular upper body clothing? 4   Taking care of personal grooming such as brushing teeth? 3   Eating meals? 4   6 Clicks Daily Activity Score 18   Functional Mobility   Sit to Stand Contact Guard Assist   Bed, Chair, Wheelchair Transfer Contact Guard Assist   Toilet Transfers Contact Guard Assist  (with  elevated over toilet commode in place)   Transfer Method Stand Step   Mobility to BR and back to bedside chair with FWW   Distance (Feet) 20   # of Times Distance was Traveled 2   Visual Perception   Visual Perception  WDL   Edema / Skin Assessment   Comments waterproof dressing intact   Activity Tolerance   Sitting in Chair > 30 min   Sitting Edge of Bed 10 min   Standing 2 min x2   Comments limited by fatigue, pain in arms whhile using walker   Patient / Family Goals   Patient / Family Goal #1 unsure- HH vs rehab   Short Term Goals   Short Term Goal # 1 Pt will dress LB with SBA and AE for hip prec in 3 visits   Short Term Goal # 2 Pt will stand 5 min at sink TTWB for grooming with SBA in 3 visits   Short Term Goal # 3 Pt will toilet supervised in 3 visits   Short Term Goal # 4 Pt will demo step into shower transfers with TTWB strategy and CGA  in 4 visits   Education Group   Education Provided Hip Precautions;Transfers;Role of Occupational Therapist;Activities of Daily Living;Adaptive Equipment;Weight Bearing Precautions   Role of Occupational Therapist Patient Response Patient;Family;Acceptance;Explanation;Verbal Demonstration   Hip Precautions Patient Response Patient;Family;Acceptance;Explanation;Demonstration;Handout;Verbal Demonstration   Transfers Patient Response Patient;Family;Acceptance;Explanation;Demonstration;Handout;Verbal Demonstration   ADL Patient Response Patient;Family;Acceptance;Explanation;Demonstration;Handout;Verbal Demonstration   Adaptive Equipment Patient Response Patient;Family;Acceptance;Explanation;Demonstration;Handout;Verbal Demonstration   Weight Bearing Precautions Patient Response Patient;Family;Acceptance;Explanation;Demonstration;Handout;Verbal Demonstration   Additional Comments see notes

## 2023-12-02 NOTE — ASSESSMENT & PLAN NOTE
Cpk up to down to 970, continue t NS at 100 today and recheck level tomorrow  This is causing his urine to be darker than usual  UA pending

## 2023-12-02 NOTE — PROGRESS NOTES
Received bedside report from morning RN. Pt alert and oriented x4. Able to verblize needs, resp even unlabored no s/s of distress. Pt complaints of pain 8/10 to r-hip prn pain meds given per mar. Safety precautions in place, call light within reach.

## 2023-12-02 NOTE — PROGRESS NOTES
[facility] Medicine Daily Progress Note    Date of Service  12/2/2023    Chief Complaint  Vasile Bowers is a 73 y.o. male admitted 11/29/2023 with right hip and thigh pain    Hospital Course  Patient is a 73-year-old male who presented after a fall from 8 foot height.  He continued to have significant pain after the fall and he has been unable to walk after the event.  Has been having increasing swelling in the left thigh.  CT scan shows a periprosthetic fracture adjacent to the right hip arthroplasty with femoral component and intermediate density enlargement of the anterior proximal thigh musculature compatible with hematoma.  Patient was seen by orthopedic surgery first thing this morning with Dr. Alaniz who deferred to Dr. Clement and patient stated he received a phone call from Dr. Clement that he be going to the OR at 4.    Interval Problem Update  11/30 patient continues to be in significant pain at time of examination.  He states that the hematoma in his right thigh feels a little bit softer and not quite as swollen as it was yesterday.  Patient remains n.p.o. to go to the OR later today with Dr. Clement  12/1 patient in good spirits today.  Wife at bedside.  He states that the pain is intense when he is ambulating or trying to ambulate and he is supposed to be toe-touch weightbearing right lower extremity for 6 weeks.  He worked with physical therapy today and they are recommending home health but a few more days in the hospital as he does have steps to get into his home.  He is also complaining of right elbow pain so we will get plain films of the right elbow to make sure there is no subtle deformity.  He is currently on aspirin 81 mg twice daily for DVT prophylaxis  12/2 patient showing improvement since yesterday.  Continues to make slow progress with therapies.  His CPK is elevated today causing his urine to be slightly dark, I started him on IV hydration and his urine should improve and  this will be renal protective.  X-ray of the right elbow showed no evidence of fracture.  Will continue with therapies and work towards either discharge home with home health versus acute inpatient rehab.    I have discussed this patient's plan of care and discharge plan at IDT rounds today with Case Management, Nursing, Nursing leadership, and other members of the IDT team.    Consultants/Specialty  orthopedics    Code Status  Full Code    Disposition  The patient is not medically cleared for discharge to home or a post-acute facility.  Anticipate discharge to: home with organized home healthcare and close outpatient follow-up    I have placed the appropriate orders for post-discharge needs.    Review of Systems  Review of Systems   Constitutional:  Negative for chills and fever.   HENT:  Negative for congestion.    Eyes:  Negative for blurred vision and photophobia.   Respiratory:  Negative for cough and shortness of breath.    Cardiovascular:  Negative for chest pain, claudication and leg swelling.   Gastrointestinal:  Negative for abdominal pain, constipation, diarrhea, heartburn and vomiting.   Genitourinary:  Negative for dysuria and hematuria.   Musculoskeletal:  Positive for myalgias (right hip pain, right elbow pain). Negative for joint pain.   Skin:  Negative for itching and rash.   Neurological:  Negative for dizziness, sensory change, speech change, weakness and headaches.   Psychiatric/Behavioral:  Negative for depression. The patient is not nervous/anxious and does not have insomnia.         Physical Exam  Temp:  [36.9 °C (98.5 °F)-37.2 °C (99 °F)] 36.9 °C (98.5 °F)  Pulse:  [67-74] 71  Resp:  [15-16] 15  BP: (106-112)/(60-65) 106/65  SpO2:  [93 %-96 %] 93 %    Physical Exam  Vitals and nursing note reviewed.   Constitutional:       General: He is not in acute distress.     Appearance: Normal appearance.   HENT:      Head: Normocephalic and atraumatic.   Eyes:      General: No scleral icterus.      Extraocular Movements: Extraocular movements intact.   Cardiovascular:      Rate and Rhythm: Normal rate and regular rhythm.      Pulses: Normal pulses.      Heart sounds: Normal heart sounds. No murmur heard.  Pulmonary:      Effort: Pulmonary effort is normal. No respiratory distress.      Breath sounds: Normal breath sounds. No wheezing, rhonchi or rales.   Abdominal:      General: Abdomen is flat. Bowel sounds are normal. There is no distension.      Palpations: Abdomen is soft.      Tenderness: There is no rebound.   Musculoskeletal:         General: Swelling and tenderness (Left thigh swelling with hematoma) present.      Cervical back: Normal range of motion and neck supple.   Lymphadenopathy:      Cervical: No cervical adenopathy.   Skin:     Coloration: Skin is not jaundiced.      Findings: No erythema.   Neurological:      General: No focal deficit present.      Mental Status: He is alert and oriented to person, place, and time. Mental status is at baseline.      Cranial Nerves: No cranial nerve deficit.   Psychiatric:         Mood and Affect: Mood normal.         Behavior: Behavior normal.         Fluids    Intake/Output Summary (Last 24 hours) at 12/2/2023 1327  Last data filed at 12/1/2023 2258  Gross per 24 hour   Intake 180 ml   Output 900 ml   Net -720 ml         Laboratory  Recent Labs     11/29/23 2114 11/30/23  0042 11/30/23  0619 11/30/23  1230 11/30/23  2230 12/01/23  0403   WBC 8.0 6.2  --   --   --  6.9   RBC 3.65* 3.59*  --   --   --  2.98*   HEMOGLOBIN 12.1* 11.8*   < > 11.6* 10.6* 9.9*   HEMATOCRIT 33.9* 34.3*   < > 32.6* 30.2* 28.3*   MCV 92.9 95.5  --   --   --  95.0   MCH 33.2* 32.9  --   --   --  33.2*   MCHC 35.7 34.4  --   --   --  35.0   RDW 42.7 43.4  --   --   --  44.0   PLATELETCT 141* 136*  --   --   --  135*   MPV 9.7 9.8  --   --   --  10.1    < > = values in this interval not displayed.       Recent Labs     11/29/23 2114 11/30/23  0042 12/01/23  0403   SODIUM 135 135 135    POTASSIUM 3.6 3.8 4.8   CHLORIDE 103 103 102   CO2 21 22 23   GLUCOSE 89 91 160*   BUN 22 21 24*   CREATININE 0.73 0.72 0.95   CALCIUM 8.5 8.4 8.1*                     Imaging  DX-ELBOW-LIMITED 2- RIGHT   Final Result      1.  No evidence of fracture.      2.  Degenerative change of the radial and ulnar compartments of the elbow.      DX-PELVIS-1 OR 2 VIEWS   Final Result         1.  Postop cerclage wire fixation of right proximal femoral periprosthetic fracture.      DX-PELVIS-1 OR 2 VIEWS   Final Result         1.  Periprosthetic fracture of the right greater trochanter.      CT-PELVIS W/O PLUS RECONS   Final Result         1.  Periprosthetic fracture adjacent to right hip arthroplasty femoral component.   2.  Intermediate density and enlargement of the anterior proximal thigh musculature, appearance most compatible with fracture hematoma. Evaluation and monitoring for development of compartment syndrome recommended as clinically appropriate.   3.  Atherosclerosis           Assessment/Plan  * Periprosthetic fracture of hip, initial encounter- (present on admission)  Assessment & Plan  -Patient fell from the roof, about 8 feet high.  -Initial right hip arthroplasty was done in August 2023.  -CT imaging showing periprostatic fracture adjacent to the right hip arthropathy of femoral component.  - Dr. Clement took patient to OR 11/30 ORIF right intertrochanteric femur fracture with revision total hip arthroplasty  PT evaluation    Elevated CPK  Assessment & Plan  Cpk up to 1108 after surgery and fall, start NS at 100 today and recheck level tomorrow  This is causing his urine to be darker than usual      Right elbow pain  Assessment & Plan  Pain increased after fall, Right elbow films ordered      Hyperlipidemia  Assessment & Plan  -On statins.      Acute blood loss anemia  Assessment & Plan  H&H stable    Preoperative cardiovascular examination  Assessment & Plan  -Cardiovascular risk is mild.  Patient is medically  optimized for surgical interventions without additional work-up.    Traumatic hematoma of hip, right, initial encounter  Assessment & Plan  Hematoma improving daily  H/h stable  Pain management      Lumbar stenosis- (present on admission)  Assessment & Plan  Resume gabapentin         VTE prophylaxis:   SCDs/TEDs      I have performed a physical exam and reviewed and updated ROS and Plan today (12/2/2023). In review of yesterday's note (12/1/2023), there are no changes except as documented above.

## 2023-12-02 NOTE — PROGRESS NOTES
Received report from [facility]  Nurse at 1510 Patient to floor via bed at 1550 Admission care rendered.Initial v/s taken by CNA. Patient is awake and alert x 4 . Dressing to right hip , dry and intact.

## 2023-12-02 NOTE — PROGRESS NOTES
4 Eyes Skin Assessment Completed by ALEXSANDRA Polk and ALEXSANDRA Rao.    Head Scab  Ears WDL  Nose WDL  Mouth WDL  Neck displaced clavicle/ protruding  Breast/Chest WDL  Shoulder Blades WDL  Spine WDL  (R) Arm/Elbow/Hand Bruising and Scab  (L) Arm/Elbow/Hand Bruising and Scab  Abdomen WDL  Groin WDL  Scrotum/Coccyx/Buttocks WDL  (R) Leg incision, scattered scabs  (L) Leg Scab  (R) Heel/Foot/Toe WDL  (L) Heel/Foot/Toe left big toe cracked/ calloused          Devices In Places : SCDs, Pulse ox      Interventions In Place N/A    Possible Skin Injury No    Pictures Uploaded Into Epic yes  Wound Consult Placed N/A  RN Wound Prevention Protocol Ordered No

## 2023-12-02 NOTE — PROGRESS NOTES
Received report from night shift RN. Assumed pt care 0700  Pt is A&Ox4, resting comfortably in bed. Plan of care reviewed for activities and goals this shift. Medication eduction provided.  Pt verbalizes understanding of plan of care for this shift.  Pt c/o pain  - medications administered.    Patients needs attended well. Fall precautions in place. Bed at lowest position. Call light and personal belongings within reach.   Hourly rounding in progress.    Pt verbalizes level of comfort increased after medications for pain administered. Pt back to bed after PT/OT.   Plan reviewed for stay and remainder of shift needs.   Fall prevention protocol in place.   1. Bed in low position  2. Call light within reach  3. Belongings and over bed table nearby  4. As needed assistive devices within reach   5. Appropriate hospital attire for fall prevention    Pt fall prevention education provided. Pt/Family verbalize understanding.

## 2023-12-02 NOTE — PROGRESS NOTES
4 Eyes Skin Assessment Completed by ALEXSANDRA Nation and Anson UPTON.    Head Scab  Ears WDL  Nose WDL  Mouth WDL  Neck WDL  Breast/Chest WDL  Shoulder Blades WDL  Spine WDL  (R) Arm/Elbow/Hand WDL  (L) Arm/Elbow/Hand WDL  Abdomen WDL  Groin WDL  Scrotum/Coccyx/Buttocks WDL  (R) Leg Scab  (L) Leg Scab  (R) Heel/Foot/Toe WDL  (L) Heel/Foot/Toe WDL          Devices In Places SCD's      Interventions In Place Pillows    Possible Skin Injury Yes    Pictures Uploaded Into Epic N/A  Wound Consult Placed N/A  RN Wound Prevention Protocol Ordered Yes

## 2023-12-03 LAB
ANION GAP SERPL CALC-SCNC: 6 MMOL/L (ref 7–16)
BUN SERPL-MCNC: 16 MG/DL (ref 8–22)
CALCIUM SERPL-MCNC: 7.7 MG/DL (ref 8.4–10.2)
CHLORIDE SERPL-SCNC: 102 MMOL/L (ref 96–112)
CK SERPL-CCNC: 974 U/L (ref 0–154)
CO2 SERPL-SCNC: 28 MMOL/L (ref 20–33)
CREAT SERPL-MCNC: 0.66 MG/DL (ref 0.5–1.4)
ERYTHROCYTE [DISTWIDTH] IN BLOOD BY AUTOMATED COUNT: 45.4 FL (ref 35.9–50)
GFR SERPLBLD CREATININE-BSD FMLA CKD-EPI: 99 ML/MIN/1.73 M 2
GLUCOSE SERPL-MCNC: 95 MG/DL (ref 65–99)
HCT VFR BLD AUTO: 22.1 % (ref 42–52)
HGB BLD-MCNC: 7.5 G/DL (ref 14–18)
MCH RBC QN AUTO: 33 PG (ref 27–33)
MCHC RBC AUTO-ENTMCNC: 33.9 G/DL (ref 32.3–36.5)
MCV RBC AUTO: 97.4 FL (ref 81.4–97.8)
PLATELET # BLD AUTO: 128 K/UL (ref 164–446)
PMV BLD AUTO: 10.4 FL (ref 9–12.9)
POTASSIUM SERPL-SCNC: 4.1 MMOL/L (ref 3.6–5.5)
RBC # BLD AUTO: 2.27 M/UL (ref 4.7–6.1)
SODIUM SERPL-SCNC: 136 MMOL/L (ref 135–145)
WBC # BLD AUTO: 5.2 K/UL (ref 4.8–10.8)

## 2023-12-03 PROCEDURE — 700102 HCHG RX REV CODE 250 W/ 637 OVERRIDE(OP): Performed by: ORTHOPAEDIC SURGERY

## 2023-12-03 PROCEDURE — 700102 HCHG RX REV CODE 250 W/ 637 OVERRIDE(OP): Performed by: INTERNAL MEDICINE

## 2023-12-03 PROCEDURE — 84153 ASSAY OF PSA TOTAL: CPT

## 2023-12-03 PROCEDURE — 82550 ASSAY OF CK (CPK): CPT

## 2023-12-03 PROCEDURE — 94760 N-INVAS EAR/PLS OXIMETRY 1: CPT

## 2023-12-03 PROCEDURE — A9270 NON-COVERED ITEM OR SERVICE: HCPCS | Performed by: INTERNAL MEDICINE

## 2023-12-03 PROCEDURE — 36415 COLL VENOUS BLD VENIPUNCTURE: CPT

## 2023-12-03 PROCEDURE — 700105 HCHG RX REV CODE 258: Performed by: INTERNAL MEDICINE

## 2023-12-03 PROCEDURE — 85027 COMPLETE CBC AUTOMATED: CPT

## 2023-12-03 PROCEDURE — 770001 HCHG ROOM/CARE - MED/SURG/GYN PRIV*

## 2023-12-03 PROCEDURE — A9270 NON-COVERED ITEM OR SERVICE: HCPCS | Performed by: ORTHOPAEDIC SURGERY

## 2023-12-03 PROCEDURE — 80048 BASIC METABOLIC PNL TOTAL CA: CPT

## 2023-12-03 PROCEDURE — 99232 SBSQ HOSP IP/OBS MODERATE 35: CPT | Performed by: INTERNAL MEDICINE

## 2023-12-03 RX ADMIN — ATORVASTATIN CALCIUM 80 MG: 40 TABLET, FILM COATED ORAL at 16:15

## 2023-12-03 RX ADMIN — ASPIRIN 81 MG: 81 TABLET, COATED ORAL at 04:36

## 2023-12-03 RX ADMIN — OXYCODONE HYDROCHLORIDE 5 MG: 5 TABLET ORAL at 20:48

## 2023-12-03 RX ADMIN — DOCUSATE SODIUM 100 MG: 100 CAPSULE, LIQUID FILLED ORAL at 16:15

## 2023-12-03 RX ADMIN — SODIUM CHLORIDE: 9 INJECTION, SOLUTION INTRAVENOUS at 16:21

## 2023-12-03 RX ADMIN — OXYCODONE HYDROCHLORIDE 5 MG: 5 TABLET ORAL at 23:43

## 2023-12-03 RX ADMIN — DOCUSATE SODIUM 50MG AND SENNOSIDES 8.6MG 1 TABLET: 8.6; 5 TABLET, FILM COATED ORAL at 20:48

## 2023-12-03 RX ADMIN — OXYCODONE HYDROCHLORIDE 10 MG: 10 TABLET ORAL at 16:14

## 2023-12-03 RX ADMIN — SODIUM CHLORIDE: 9 INJECTION, SOLUTION INTRAVENOUS at 04:38

## 2023-12-03 RX ADMIN — ASPIRIN 81 MG: 81 TABLET, COATED ORAL at 16:15

## 2023-12-03 RX ADMIN — GABAPENTIN 300 MG: 300 CAPSULE ORAL at 16:15

## 2023-12-03 RX ADMIN — OXYCODONE HYDROCHLORIDE 5 MG: 5 TABLET ORAL at 04:36

## 2023-12-03 RX ADMIN — GABAPENTIN 300 MG: 300 CAPSULE ORAL at 04:36

## 2023-12-03 RX ADMIN — OXYCODONE HYDROCHLORIDE 10 MG: 10 TABLET ORAL at 12:35

## 2023-12-03 RX ADMIN — DOCUSATE SODIUM 100 MG: 100 CAPSULE, LIQUID FILLED ORAL at 04:36

## 2023-12-03 RX ADMIN — OXYCODONE HYDROCHLORIDE 10 MG: 10 TABLET ORAL at 09:04

## 2023-12-03 ASSESSMENT — ENCOUNTER SYMPTOMS
SENSORY CHANGE: 0
NERVOUS/ANXIOUS: 0
SPEECH CHANGE: 0
COUGH: 0
CHILLS: 0
CONSTIPATION: 0
VOMITING: 0
INSOMNIA: 0
WEAKNESS: 0
DEPRESSION: 0
PHOTOPHOBIA: 0
DIZZINESS: 0
SHORTNESS OF BREATH: 0
FEVER: 0
CLAUDICATION: 0
HEADACHES: 0
DIARRHEA: 0
MYALGIAS: 1
HEARTBURN: 0
BLURRED VISION: 0
ABDOMINAL PAIN: 0

## 2023-12-03 ASSESSMENT — PAIN DESCRIPTION - PAIN TYPE
TYPE: SURGICAL PAIN

## 2023-12-03 NOTE — FACE TO FACE
Face to Face Note  -  Durable Medical Equipment    Janet Gregorio D.O. - NPI: [id number]  I certify that this patient is under my care and that they had a durable medical equipment(DME)face to face encounter by myself that meets the physician DME face-to-face encounter requirements with this patient on:    Date of encounter:   Patient:                    MRN:                       YOB: 2022  Vasile Bowers  [id number]  1950     The encounter with the patient was in whole, or in part, for the following medical condition, which is the primary reason for durable medical equipment:  Total Joint Replacement    I certify that, based on my findings, the following durable medical equipment is medically necessary:    Wheelchair   Patient needs manual wheelchair for use inside the home based on the above diagnosis. Per guidelines patient meets criteria in the following ways:   A.  Patient has significant impairment in the following Toileting, Dressing, and Grooming and is is unable to complete these tasks in a reasonable timeframe.   B.  The patient's mobility limitations cannot be sufficiently resolved by use of  fitted cane or walker.   C.  The patient reports his home provides adequate access between rooms,  maneuvering space, and surfaces for use of the manual wheelchair that is  provided.   D.  The use of the manual wheelchair will significantly improve the patient's  ability to participate in MRADLs and the patient will use it on a regular basis in  the home.   E.  The patient has not expressed an unwillingness to use the manual  wheelchair.   F. The patient has limitations of strength, endurance, range of motion, or coordination per OT notes:.    My Clinical findings support the need for the above equipment due to:  Abnormal Gait

## 2023-12-03 NOTE — THERAPY
Physical Therapy   Daily Treatment     Patient Name: Vasile Bowers  Age:  73 y.o., Sex:  male  Medical Record #: [id number]  Today's Date: 12/2/2023     Precautions  Precautions: (P) Fall Risk;Posterior Hip Precautions;Toe Touch Weight Bearing Right Lower Extremity    Assessment    Pt progressing with ambulation and transfer with FWW. Good adherence to TTWB RLE. Pt able to perform platform step with CGA by end of session. He will continue to benefit from skilled PT to further address stair training. Anticipate pt to be able to D/C home with home health, though he will benefit from W/C for dependent ascension of stairs.    Plan    Treatment Plan Status: (P) Continue Current Treatment Plan  Type of Treatment: Bed Mobility, Neuro Re-Education / Balance, Gait Training, Self Care / Home Evaluation, Stair Training, Therapeutic Activities, Therapeutic Exercise  Treatment Frequency: 5 Times per Week  Treatment Duration: Until Therapy Goals Met    DC Equipment Recommendations: (P) Front-Wheel Walker, Wheelchair (W/C for dependent ascension of stairs)  Discharge Recommendations: (P) Recommend home health for continued physical therapy services      Subjective    Pt stating he is tired and sore in his triceps from the walker.     Objective       12/02/23 1600   Precautions   Precautions Fall Risk;Posterior Hip Precautions;Toe Touch Weight Bearing Right Lower Extremity   Pain 0 - 10 Group   Location Hip   Therapist Pain Assessment 0;Prior to Activity  (increased pain with mobility)   Cognition    Cognition / Consciousness WDL   Sitting Lower Body Exercises   Sitting Lower Body Exercises Yes   Ankle Pumps 2 sets of 10   Balance   Sitting Balance (Static) Fair +   Sitting Balance (Dynamic) Fair +   Standing Balance (Static) Fair   Standing Balance (Dynamic) Fair   Weight Shift Sitting Fair   Weight Shift Standing Fair   Skilled Intervention Compensatory Strategies;Verbal Cuing;Tactile Cuing   Comments with FWW   Bed Mobility     Supine to Sit Minimal Assist  (for LLE)   Sit to Supine Minimal Assist   Skilled Intervention Tactile Cuing;Verbal Cuing;Compensatory Strategies   Gait Analysis   Gait Level Of Assist Standby Assist   Assistive Device Front Wheel Walker   Distance (Feet) 10   # of Times Distance was Traveled 3   Deviation Step To;Decreased Heel Strike;Decreased Toe Off   # of Stairs Climbed 1   Level of Assist with Stairs Contact Guard Assist   Weight Bearing Status TTWB RLE   Skilled Intervention Compensatory Strategies;Tactile Cuing;Verbal Cuing;Sequencing   Comments instructed platform step training, pt stating room for walker on steps at home   Functional Mobility   Sit to Stand Standby Assist   Bed, Chair, Wheelchair Transfer Standby Assist   Transfer Method Stand Step   Mobility supine->sit EOB->stand->amb->sit->stand->amb->sit EOB->supine   Skilled Intervention Compensatory Strategies;Tactile Cuing;Sequencing   Comments cues for hand placement   How much difficulty does the patient currently have...   Turning over in bed (including adjusting bedclothes, sheets and blankets)? 1   Sitting down on and standing up from a chair with arms (e.g., wheelchair, bedside commode, etc.) 1   Moving from lying on back to sitting on the side of the bed? 1   How much help from another person does the patient currently need...   Moving to and from a bed to a chair (including a wheelchair)? 3   Need to walk in a hospital room? 3   Climbing 3-5 steps with a railing? 3   6 clicks Mobility Score 12   Activity Tolerance   Sitting in Chair 2 min approx   Sitting Edge of Bed 2 min approx   Standing 12 min approx   Comments limited by fatigue, arm pain   Short Term Goals    Short Term Goal # 1 in 6 V pt will perform bed mob with flat bed and no rail with CGA   Goal Outcome # 1 Progressing as expected   Short Term Goal # 2 in 6 V pt will transfer using CGA with FWW to various surfaces   Goal Outcome # 2 Progressing as expected   Short Term Goal # 3  in 6 V pt will amb using FWW and TTWB x 100 feet with SBA   Goal Outcome # 3 Progressing as expected   Short Term Goal # 4 in 6 V pt will climb up and down 2 step with sBA B rail   Goal Outcome # 4 Progressing as expected   Physical Therapy Treatment Plan   Physical Therapy Treatment Plan Continue Current Treatment Plan   Anticipated Discharge Equipment and Recommendations   DC Equipment Recommendations Front-Wheel Walker;Wheelchair  (W/C for dependent ascension of stairs)   Discharge Recommendations Recommend home health for continued physical therapy services

## 2023-12-03 NOTE — PROGRESS NOTES
Received bedside patient report from ALEXSANDRA Stewart. Patient resting comfortably in bed, no complaints at this time. Safety precautions in place. Will continue to monitor.

## 2023-12-03 NOTE — CARE PLAN
The patient is Stable - Low risk of patient condition declining or worsening    Shift Goals  Clinical Goals: Free from falls or injuries, pain controlled at 3/10 or better, will be able to rest or sleep at least 4 hours overnight  Patient Goals: Free from falls or injuries, pain controlled at 3/10 or better, will be able to rest or sleep at least 4 hours overnight  Family Goals: plan of care, communication    Progress made toward(s) clinical / shift goals: No falls or injuries overnight, pain controlled at 3/10 or better with current pain regimen, patient rested and slept at least 4 hours overnight    Patient is not progressing towards the following goals:

## 2023-12-03 NOTE — PROGRESS NOTES
[facility] Medicine Daily Progress Note    Date of Service  12/3/2023    Chief Complaint  Vasile Bowers is a 73 y.o. male admitted 11/29/2023 with right hip and thigh pain    Hospital Course  Patient is a 73-year-old male who presented after a fall from 8 foot height.  He continued to have significant pain after the fall and he has been unable to walk after the event.  Has been having increasing swelling in the left thigh.  CT scan shows a periprosthetic fracture adjacent to the right hip arthroplasty with femoral component and intermediate density enlargement of the anterior proximal thigh musculature compatible with hematoma.  Patient was seen by orthopedic surgery first thing this morning with Dr. Alaniz who deferred to Dr. Clement and patient stated he received a phone call from Dr. Clement that he be going to the OR at 4.    Interval Problem Update  11/30 patient continues to be in significant pain at time of examination.  He states that the hematoma in his right thigh feels a little bit softer and not quite as swollen as it was yesterday.  Patient remains n.p.o. to go to the OR later today with Dr. Clement  12/1 patient in good spirits today.  Wife at bedside.  He states that the pain is intense when he is ambulating or trying to ambulate and he is supposed to be toe-touch weightbearing right lower extremity for 6 weeks.  He worked with physical therapy today and they are recommending home health but a few more days in the hospital as he does have steps to get into his home.  He is also complaining of right elbow pain so we will get plain films of the right elbow to make sure there is no subtle deformity.  He is currently on aspirin 81 mg twice daily for DVT prophylaxis  12/2 patient showing improvement since yesterday.  Continues to make slow progress with therapies.  His CPK is elevated today causing his urine to be slightly dark, I started him on IV hydration and his urine should improve and  this will be renal protective.  X-ray of the right elbow showed no evidence of fracture.  Will continue with therapies and work towards either discharge home with home health versus acute inpatient rehab.  12/3 patient continuing to show improvement.  His CPK did drop down but not significantly it still 970.  He is hoping to discharge home he was hoping to go home today but I told him I wanted to keep him on IV fluids for 1 more day and make sure his CPK continues to drop.  Anticipate he should discharge home tomorrow and will coordinate home health as well as a wheelchair for discharge.    I have discussed this patient's plan of care and discharge plan at IDT rounds today with Case Management, Nursing, Nursing leadership, and other members of the IDT team.    Consultants/Specialty  orthopedics    Code Status  Full Code    Disposition  The patient is not medically cleared for discharge to home or a post-acute facility.  Anticipate discharge to: home with organized home healthcare and close outpatient follow-up    I have placed the appropriate orders for post-discharge needs.    Review of Systems  Review of Systems   Constitutional:  Negative for chills and fever.   HENT:  Negative for congestion.    Eyes:  Negative for blurred vision and photophobia.   Respiratory:  Negative for cough and shortness of breath.    Cardiovascular:  Negative for chest pain, claudication and leg swelling.   Gastrointestinal:  Negative for abdominal pain, constipation, diarrhea, heartburn and vomiting.   Genitourinary:  Negative for dysuria and hematuria.   Musculoskeletal:  Positive for myalgias (right hip pain, right elbow pain). Negative for joint pain.   Skin:  Negative for itching and rash.   Neurological:  Negative for dizziness, sensory change, speech change, weakness and headaches.   Psychiatric/Behavioral:  Negative for depression. The patient is not nervous/anxious and does not have insomnia.         Physical Exam  Temp:  [36.4  °C (97.6 °F)-37.2 °C (98.9 °F)] 36.4 °C (97.6 °F)  Pulse:  [68-84] 72  Resp:  [16] 16  BP: (102-114)/(57-68) 112/64  SpO2:  [90 %-96 %] 96 %    Physical Exam  Vitals and nursing note reviewed.   Constitutional:       General: He is not in acute distress.     Appearance: Normal appearance.   HENT:      Head: Normocephalic and atraumatic.   Eyes:      General: No scleral icterus.     Extraocular Movements: Extraocular movements intact.   Cardiovascular:      Rate and Rhythm: Normal rate and regular rhythm.      Pulses: Normal pulses.      Heart sounds: Normal heart sounds. No murmur heard.  Pulmonary:      Effort: Pulmonary effort is normal. No respiratory distress.      Breath sounds: Normal breath sounds. No wheezing, rhonchi or rales.   Abdominal:      General: Abdomen is flat. Bowel sounds are normal. There is no distension.      Palpations: Abdomen is soft.      Tenderness: There is no rebound.   Musculoskeletal:         General: Swelling and tenderness (Left thigh swelling with hematoma) present.      Cervical back: Normal range of motion and neck supple.   Lymphadenopathy:      Cervical: No cervical adenopathy.   Skin:     Coloration: Skin is not jaundiced.      Findings: No erythema.   Neurological:      General: No focal deficit present.      Mental Status: He is alert and oriented to person, place, and time. Mental status is at baseline.      Cranial Nerves: No cranial nerve deficit.   Psychiatric:         Mood and Affect: Mood normal.         Behavior: Behavior normal.         Fluids    Intake/Output Summary (Last 24 hours) at 12/3/2023 1520  Last data filed at 12/3/2023 0844  Gross per 24 hour   Intake 2596.67 ml   Output 900 ml   Net 1696.67 ml         Laboratory  Recent Labs     11/30/23  2230 12/01/23  0403 12/03/23  0105   WBC  --  6.9 5.2   RBC  --  2.98* 2.27*   HEMOGLOBIN 10.6* 9.9* 7.5*   HEMATOCRIT 30.2* 28.3* 22.1*   MCV  --  95.0 97.4   MCH  --  33.2* 33.0   MCHC  --  35.0 33.9   RDW  --  44.0  45.4   PLATELETCT  --  135* 128*   MPV  --  10.1 10.4       Recent Labs     12/01/23  0403 12/03/23  0105   SODIUM 135 136   POTASSIUM 4.8 4.1   CHLORIDE 102 102   CO2 23 28   GLUCOSE 160* 95   BUN 24* 16   CREATININE 0.95 0.66   CALCIUM 8.1* 7.7*                     Imaging  DX-ELBOW-LIMITED 2- RIGHT   Final Result      1.  No evidence of fracture.      2.  Degenerative change of the radial and ulnar compartments of the elbow.      DX-PELVIS-1 OR 2 VIEWS   Final Result         1.  Postop cerclage wire fixation of right proximal femoral periprosthetic fracture.      DX-PELVIS-1 OR 2 VIEWS   Final Result         1.  Periprosthetic fracture of the right greater trochanter.      CT-PELVIS W/O PLUS RECONS   Final Result         1.  Periprosthetic fracture adjacent to right hip arthroplasty femoral component.   2.  Intermediate density and enlargement of the anterior proximal thigh musculature, appearance most compatible with fracture hematoma. Evaluation and monitoring for development of compartment syndrome recommended as clinically appropriate.   3.  Atherosclerosis           Assessment/Plan  * Periprosthetic fracture of hip, initial encounter- (present on admission)  Assessment & Plan  -Patient fell from the roof, about 8 feet high.  -Initial right hip arthroplasty was done in August 2023.  -CT imaging showing periprostatic fracture adjacent to the right hip arthropathy of femoral component.  - Dr. Clement took patient to OR 11/30 ORIF right intertrochanteric femur fracture with revision total hip arthroplasty  PT evaluation reccommending home health and wheelchair on discharge - ordered.      Elevated CPK  Assessment & Plan  Cpk up to down to 970, continue t NS at 100 today and recheck level tomorrow  This is causing his urine to be darker than usual  UA pending        Right elbow pain  Assessment & Plan  Pain increased after fall, Right elbow films ordered      Hyperlipidemia  Assessment & Plan  -On statins.       Acute blood loss anemia  Assessment & Plan  H&H stable    Preoperative cardiovascular examination  Assessment & Plan  -Cardiovascular risk is mild.  Patient is medically optimized for surgical interventions without additional work-up.    Traumatic hematoma of hip, right, initial encounter  Assessment & Plan  Hematoma improving daily  H/h dropped with IV hydration, no worsening swelling in the surgery site/hematoma.  Pain management      Lumbar stenosis- (present on admission)  Assessment & Plan  Resume gabapentin         VTE prophylaxis:   SCDs/TEDs      I have performed a physical exam and reviewed and updated ROS and Plan today (12/3/2023). In review of yesterday's note (12/2/2023), there are no changes except as documented above.

## 2023-12-04 VITALS
HEART RATE: 67 BPM | WEIGHT: 141.09 LBS | SYSTOLIC BLOOD PRESSURE: 127 MMHG | DIASTOLIC BLOOD PRESSURE: 74 MMHG | RESPIRATION RATE: 16 BRPM | TEMPERATURE: 99.5 F | BODY MASS INDEX: 21.38 KG/M2 | OXYGEN SATURATION: 97 % | HEIGHT: 68 IN

## 2023-12-04 LAB
ANION GAP SERPL CALC-SCNC: 8 MMOL/L (ref 7–16)
APPEARANCE UR: CLEAR
BILIRUB UR QL STRIP.AUTO: NEGATIVE
BUN SERPL-MCNC: 15 MG/DL (ref 8–22)
CALCIUM SERPL-MCNC: 7.9 MG/DL (ref 8.4–10.2)
CHLORIDE SERPL-SCNC: 104 MMOL/L (ref 96–112)
CK SERPL-CCNC: 715 U/L (ref 0–154)
CO2 SERPL-SCNC: 26 MMOL/L (ref 20–33)
COLOR UR: YELLOW
CREAT SERPL-MCNC: 0.79 MG/DL (ref 0.5–1.4)
ERYTHROCYTE [DISTWIDTH] IN BLOOD BY AUTOMATED COUNT: 45.8 FL (ref 35.9–50)
GFR SERPLBLD CREATININE-BSD FMLA CKD-EPI: 94 ML/MIN/1.73 M 2
GLUCOSE SERPL-MCNC: 120 MG/DL (ref 65–99)
GLUCOSE UR STRIP.AUTO-MCNC: NEGATIVE MG/DL
HCT VFR BLD AUTO: 21.9 % (ref 42–52)
HGB BLD-MCNC: 7.4 G/DL (ref 14–18)
KETONES UR STRIP.AUTO-MCNC: NEGATIVE MG/DL
LEUKOCYTE ESTERASE UR QL STRIP.AUTO: NEGATIVE
MCH RBC QN AUTO: 32.9 PG (ref 27–33)
MCHC RBC AUTO-ENTMCNC: 33.8 G/DL (ref 32.3–36.5)
MCV RBC AUTO: 97.3 FL (ref 81.4–97.8)
MICRO URNS: NORMAL
NITRITE UR QL STRIP.AUTO: NEGATIVE
PH UR STRIP.AUTO: 6 [PH] (ref 5–8)
PLATELET # BLD AUTO: 149 K/UL (ref 164–446)
PMV BLD AUTO: 10.2 FL (ref 9–12.9)
POTASSIUM SERPL-SCNC: 4.3 MMOL/L (ref 3.6–5.5)
PROT UR QL STRIP: NEGATIVE MG/DL
RBC # BLD AUTO: 2.25 M/UL (ref 4.7–6.1)
RBC UR QL AUTO: NEGATIVE
SODIUM SERPL-SCNC: 138 MMOL/L (ref 135–145)
SP GR UR STRIP.AUTO: <=1.005
WBC # BLD AUTO: 4.8 K/UL (ref 4.8–10.8)

## 2023-12-04 PROCEDURE — 97530 THERAPEUTIC ACTIVITIES: CPT

## 2023-12-04 PROCEDURE — 97110 THERAPEUTIC EXERCISES: CPT

## 2023-12-04 PROCEDURE — A9270 NON-COVERED ITEM OR SERVICE: HCPCS | Performed by: ORTHOPAEDIC SURGERY

## 2023-12-04 PROCEDURE — 36415 COLL VENOUS BLD VENIPUNCTURE: CPT

## 2023-12-04 PROCEDURE — 81003 URINALYSIS AUTO W/O SCOPE: CPT

## 2023-12-04 PROCEDURE — 700105 HCHG RX REV CODE 258: Performed by: INTERNAL MEDICINE

## 2023-12-04 PROCEDURE — 700102 HCHG RX REV CODE 250 W/ 637 OVERRIDE(OP): Performed by: ORTHOPAEDIC SURGERY

## 2023-12-04 PROCEDURE — 97116 GAIT TRAINING THERAPY: CPT

## 2023-12-04 PROCEDURE — 99239 HOSP IP/OBS DSCHRG MGMT >30: CPT | Performed by: INTERNAL MEDICINE

## 2023-12-04 PROCEDURE — 94760 N-INVAS EAR/PLS OXIMETRY 1: CPT

## 2023-12-04 PROCEDURE — A9270 NON-COVERED ITEM OR SERVICE: HCPCS | Performed by: INTERNAL MEDICINE

## 2023-12-04 PROCEDURE — 85027 COMPLETE CBC AUTOMATED: CPT

## 2023-12-04 PROCEDURE — 700102 HCHG RX REV CODE 250 W/ 637 OVERRIDE(OP): Performed by: INTERNAL MEDICINE

## 2023-12-04 PROCEDURE — 80048 BASIC METABOLIC PNL TOTAL CA: CPT

## 2023-12-04 PROCEDURE — 82550 ASSAY OF CK (CPK): CPT

## 2023-12-04 RX ORDER — OXYCODONE HYDROCHLORIDE 10 MG/1
10 TABLET ORAL EVERY 6 HOURS PRN
Qty: 40 TABLET | Refills: 0 | Status: SHIPPED | OUTPATIENT
Start: 2023-12-04 | End: 2023-12-14

## 2023-12-04 RX ORDER — AMOXICILLIN 250 MG
1 CAPSULE ORAL EVERY 8 HOURS
COMMUNITY
Start: 2023-12-04

## 2023-12-04 RX ORDER — ASPIRIN 81 MG/1
81 TABLET ORAL 2 TIMES DAILY
Qty: 100 TABLET | COMMUNITY
Start: 2023-12-04

## 2023-12-04 RX ADMIN — OXYCODONE HYDROCHLORIDE 5 MG: 5 TABLET ORAL at 06:28

## 2023-12-04 RX ADMIN — ASPIRIN 81 MG: 81 TABLET, COATED ORAL at 04:43

## 2023-12-04 RX ADMIN — SODIUM CHLORIDE: 9 INJECTION, SOLUTION INTRAVENOUS at 01:44

## 2023-12-04 RX ADMIN — DOCUSATE SODIUM 100 MG: 100 CAPSULE, LIQUID FILLED ORAL at 04:43

## 2023-12-04 RX ADMIN — OXYCODONE HYDROCHLORIDE 10 MG: 10 TABLET ORAL at 14:23

## 2023-12-04 RX ADMIN — OXYCODONE HYDROCHLORIDE 5 MG: 5 TABLET ORAL at 02:48

## 2023-12-04 RX ADMIN — GABAPENTIN 300 MG: 300 CAPSULE ORAL at 04:43

## 2023-12-04 ASSESSMENT — PAIN DESCRIPTION - PAIN TYPE
TYPE: SURGICAL PAIN

## 2023-12-04 ASSESSMENT — COGNITIVE AND FUNCTIONAL STATUS - GENERAL
CLIMB 3 TO 5 STEPS WITH RAILING: TOTAL
MOVING FROM LYING ON BACK TO SITTING ON SIDE OF FLAT BED: A LITTLE
SUGGESTED CMS G CODE MODIFIER MOBILITY: CL
MOBILITY SCORE: 14
TURNING FROM BACK TO SIDE WHILE IN FLAT BAD: A LOT
MOVING TO AND FROM BED TO CHAIR: A LOT
STANDING UP FROM CHAIR USING ARMS: A LITTLE
WALKING IN HOSPITAL ROOM: A LITTLE

## 2023-12-04 ASSESSMENT — PATIENT HEALTH QUESTIONNAIRE - PHQ9
1. LITTLE INTEREST OR PLEASURE IN DOING THINGS: NOT AT ALL
SUM OF ALL RESPONSES TO PHQ9 QUESTIONS 1 AND 2: 0
2. FEELING DOWN, DEPRESSED, IRRITABLE, OR HOPELESS: NOT AT ALL

## 2023-12-04 ASSESSMENT — GAIT ASSESSMENTS
GAIT LEVEL OF ASSIST: STANDBY ASSIST
DISTANCE (FEET): 25
ASSISTIVE DEVICE: FRONT WHEEL WALKER
DEVIATION: STEP TO

## 2023-12-04 NOTE — DISCHARGE PLANNING
@13:04 DANAE resent referral to [facility] on the back-end . Facility will call DANAE back once order is processed

## 2023-12-04 NOTE — THERAPY
Physical Therapy   Daily Treatment     Patient Name: Vasile Bowers  Age:  73 y.o., Sex:  male  Medical Record #: [id number]  Today's Date: 12/4/2023     Precautions  Precautions: Posterior Hip Precautions;Toe Touch Weight Bearing Right Lower Extremity    Assessment    Pt plans to DC home today, reports feeling more fatigued, R LE considerably more swollen than L, RN updated. Pt was able to maintain TTWB R LE throughout treatment but was unable to perform up/down 1 step today. Rec pt use w/c at top of stairs and back up to step, then sit down. Pt and spouse agreeable to this. Recommend HHPT.     Plan    Treatment Plan Status: Modify Current Treatment Plan  Type of Treatment: Bed Mobility, Neuro Re-Education / Balance, Gait Training, Self Care / Home Evaluation, Stair Training, Therapeutic Activities, Therapeutic Exercise  Treatment Frequency: 5 Times per Week  Treatment Duration: Until Therapy Goals Met    DC Equipment Recommendations: Wheelchair  Discharge Recommendations: Recommend home health for continued physical therapy services       12/04/23 1040   Cognition    Comments Pt reports feeling tired, flat affect, wants to go home   Supine Lower Body Exercise   Hip Adduction  1 set of 10;Right  (assisted)   Heel Slide 1 set of 10;Right  (assisted)   Ankle Pumps 1 set of 10;Reciprocal   Gluteal Isometrics 1 set of 10;Bilateral   Quadriceps Isometrics 1 set of 10;Left;Right   Standing Lower Body Exercises   Hip Flexion 1 set of 10;Right    Hip Extension 1 set of 10;Right    Hip Abduction 1 set of 10;Right    Home Exercise Program   Home Exercise Program Patient Able to Complete Home Program Independently, Safely   Comments handout issued, all exs reviewed   Balance   Sitting Balance (Static) Fair +   Sitting Balance (Dynamic) Fair   Standing Balance (Static) Fair   Standing Balance (Dynamic) Fair   Weight Shift Sitting Fair   Weight Shift Standing Fair   Skilled Intervention Postural Facilitation;Compensatory  Strategies;Tactile Cuing;Verbal Cuing;Sequencing   Comments stdg with FWW   Bed Mobility    Supine to Sit Minimal Assist   Comments Pt required assist with tech for sup <>sit, wanted to cross L LE under R LE to assist with OOB however instructed no crossing legs. pt states will prob sleep in a recliner at home   Gait Analysis   Gait Level Of Assist Standby Assist   Assistive Device Front Wheel Walker   Distance (Feet) 25   # of Times Distance was Traveled 2   Deviation Step To   Weight Bearing Status Pt able to maintain TTWB R LE   Comments Attempted up/down step however pt with difficulty hopping and getting enough clearance to get L LE onto step while using FWW for support. Determine stairs were not safe at this time. Instructed pt to back up to step and then have chair at edge of top of step, back up and sit down in chair. Reviewed this technique with spouse as well   Functional Mobility   Sit to Stand Standby Assist   Bed, Chair, Wheelchair Transfer Standby Assist   Activity Tolerance   Standing 5-6 min, fatigues quickly with increased SOB with activity, sats 97% on RA   Short Term Goals    Short Term Goal # 1 in 6 V pt will perform bed mob with flat bed and no rail with CGA   Goal Outcome # 1 goal not met   Short Term Goal # 2 in 6 V pt will transfer using CGA with FWW to various surfaces   Goal Outcome # 2 Goal met   Short Term Goal # 3 in 6 V pt will amb using FWW and TTWB x 100 feet with SBA   Goal Outcome # 3 Goal not met   Short Term Goal # 4 in 6 V pt will climb up and down 2 step with sBA B rail   Goal Outcome # 4 Goal not met

## 2023-12-04 NOTE — CARE PLAN
The patient is Stable - Low risk of patient condition declining or worsening    Shift Goals  Clinical Goals: increase activity with comfort  Patient Goals: reduce pain with activity , shower  Family Goals: plan of care, communication    Progress made toward(s) clinical / shift goals:  met    Patient is not progressing towards the following goals:

## 2023-12-04 NOTE — CARE PLAN
"The patient is Stable - Low risk of patient condition declining or worsening    Shift Goals  Clinical Goals: Pt will report pain <3/10 after ordered interventions; pt will remain free from falls and injury during shift.  Patient Goals: Rest; \"Stay ahead of the pain.\"  Family Goals: plan of care, communication    Progress made toward(s) clinical / shift goals:  Pt reported pain relief 3/10 after ordered interventions; Pt remained free from falls and injury during shift.    Patient is not progressing towards the following goals:      "

## 2023-12-04 NOTE — DISCHARGE PLANNING
"Case Management Discharge Planning    Admission Date: 11/29/2023  GMLOS: 3.9  ALOS: 5    6-Clicks ADL Score: 18  6-Clicks Mobility Score: 12  PT and/or OT Eval ordered: Yes  Post-acute Referrals Ordered: Yes  Post-acute Choice Obtained: Yes  Has referral(s) been sent to post-acute provider:  Yes      Anticipated Discharge Dispo: Discharge Disposition: D/T to home under HHA care in anticipation of covered skilled care (06)    DME Needed: Yes    DME Ordered: Yes    Action(s) Taken: LSW met with pt at bedside to deliver second IMM. Pt has been accepted by [facility] and is pending a wheelchair for DC. LSW obtained DME choice for 1) [facility] 2) [facility] and 3) [facility]. Choice form given to DPA.    Addendum @9919  LSW made phone call to [facility] and spoke with Marvin who reported they do not have any wheelchairs in stock.    LSW made phone call to Arnie with [facility] who confirmed they do have 18\" wheelchairs in stock that can be delivered today. Referrals faxed to [facility].    Addendum @7899  LSW notified by RN pt does not want to wait for WC delivery and would like it to be delivered to his home. VM left for Arnie with [facility].    Escalations Completed: None    Medically Clear: Yes    Next Steps: Care coordination will f/u with DME referrals    Barriers to Discharge: DME    Is the patient up for discharge tomorrow: No-anticipated to DC home today  "

## 2023-12-04 NOTE — DISCHARGE SUMMARY
Discharge Summary    CHIEF COMPLAINT ON ADMISSION  Chief Complaint   Patient presents with    T-5000 GLF     Fell 8 Feet off the ratters   Pt reports only pain is in his right hip  This injury occurred around 1500        Reason for Admission  Fall, Hip Pain     Admission Date  11/29/2023    CODE STATUS  Full Code    HPI & HOSPITAL COURSE  Patient is a 73-year-old male who presented after a fall from 8 foot height.  He continued to have significant pain after the fall and he has been unable to walk after the event.  Has been having increasing swelling in the left thigh.  CT scan shows a periprosthetic fracture adjacent to the right hip arthroplasty with femoral component and intermediate density enlargement of the anterior proximal thigh musculature compatible with hematoma.  Patient was seen by orthopedic surgery and taken to the OR and had a ORIF right intertrochanteric femur fracture with revision of total hip arthroplasty stem.  Patient tolerated the procedure well but also had significantly elevated CPK with the initial hematoma as well as surgery itself.  Initial CPK was 500 and then went up to 1100 therefore IV fluids were started and he required 2 days of IV fluids to get his CPK under acceptable levels and on day of discharge it is down to 715.  Wheelchair to get around was ordered for the patient.  Have also sent home prescription for oxycodone for pain management.  He will continue with home health physical therapy until he is able to bear weight and then should be able to transition to outpatient physical therapy.  Patient to follow-up with Dr. Clement in approximately 1 to 2 weeks.  Of note patient did have a significant hematoma on admission and did have blood loss related to this.  He had stable H&H until he was started on IV fluids for the elevated CPK.  Hemoglobin has remained stable at 7.4 and should continue to increase.  Patient has no sign of active bleeding at this time.    Therefore, he is  discharged in good and stable condition to home with close outpatient follow-up.    The patient met 2-midnight criteria for an inpatient stay at the time of discharge.    Discharge Date  12/4/23    FOLLOW UP ITEMS POST DISCHARGE  PCP and Dr. Clement    DISCHARGE DIAGNOSES  Principal Problem:    Periprosthetic fracture of hip, initial encounter (POA: Yes)  Active Problems:    Lumbar stenosis (POA: Yes)    Traumatic hematoma of hip, right, initial encounter (POA: Unknown)    Preoperative cardiovascular examination (POA: Unknown)    Acute blood loss anemia (POA: Unknown)    Hyperlipidemia (POA: Unknown)    Right elbow pain (POA: Unknown)    Elevated CPK (POA: Unknown)  Resolved Problems:    * No resolved hospital problems. *      FOLLOW UP  Future Appointments   Date Time Provider Department Center   1/9/2024  4:30 PM Clarisse Heath M.D. [facility]   1/18/2024  2:00 PM Brandon Magallanes D.O. [facility]     Brandon Magallanes D.O.  [address]  [place] [id number] [phone]    Follow up      Clark Clement M.D.  [address]  [place] [address]  [phone]    Follow up in 1 week(s)        MEDICATIONS ON DISCHARGE     Medication List        START taking these medications        Instructions   oxyCODONE immediate release 10 MG immediate release tablet  Commonly known as: Roxicodone   Take 1 Tablet by mouth every 6 hours as needed for Severe Pain for up to 10 days.  Dose: 10 mg     senna-docusate 8.6-50 MG Tabs  Commonly known as: Pericolace Or Senokot S   Doctor's comments: Take with each pain pill.  Take 1 Tablet by mouth every 8 hours.  Dose: 1 Tablet            CHANGE how you take these medications        Instructions   aspirin 81 MG EC tablet  What changed: when to take this   Take 1 Tablet by mouth 2 times a day.  Dose: 81 mg     gabapentin 300 MG Caps  What changed: additional instructions  Commonly known as: Neurontin   Take 1 Capsule by mouth 2 times a day for 90 days. Take 300 mg  twice a day. Increase by one every 4-5 days to a max of 3 in the morning and 3 at night (1800 mg/day  Dose: 300 mg            CONTINUE taking these medications        Instructions   atorvastatin 80 MG tablet  Commonly known as: Lipitor   Take 1 Tablet by mouth every evening.  Dose: 80 mg     B-12 PO   Take 1 tablet by mouth every day.  Dose: 1 Tablet     GLUCOSAMINE 1500 COMPLEX PO   Take 1 Tablet by mouth every day.  Dose: 1 Tablet     meloxicam 7.5 MG Tabs  Commonly known as: Mobic   Take 1 Tablet by mouth every day.  Dose: 7.5 mg     Turmeric 500 MG Caps   Take 1 Capsule by mouth every day.  Dose: 1 Capsule     VITAMIN C PO   Take 1 tablet by mouth every day.  Dose: 1 Tablet     VITAMIN D3   Take 1 Capsule by mouth every day.  Dose: 1 Capsule     VITAMIN K PO   Take 1 Tablet by mouth every day.  Dose: 1 Tablet              Allergies  No Known Allergies    DIET  Orders Placed This Encounter   Procedures    Diet Order Diet: Regular     Standing Status:   Standing     Number of Occurrences:   1     Order Specific Question:   Diet:     Answer:   Regular [1]       ACTIVITY  As tolerated.  Touch-down weight bearing RIGHT leg    CONSULTATIONS  Orthopedic surgery-Dr. Clement    PROCEDURES  11/30/23- Racquel-ORIF right intertrochanteric femur fracture with revision of total hip arthroplasty stem    LABORATORY  Lab Results   Component Value Date    SODIUM 138 12/04/2023    POTASSIUM 4.3 12/04/2023    CHLORIDE 104 12/04/2023    CO2 26 12/04/2023    GLUCOSE 120 (H) 12/04/2023    BUN 15 12/04/2023    CREATININE 0.79 12/04/2023        Lab Results   Component Value Date    WBC 4.8 12/04/2023    HEMOGLOBIN 7.4 (L) 12/04/2023    HEMATOCRIT 21.9 (L) 12/04/2023    PLATELETCT 149 (L) 12/04/2023        Total time of the discharge process exceeds 36 minutes.

## 2023-12-04 NOTE — PROGRESS NOTES
Received report from day shift nurse. Assumed pt care at 1915. Pt is A&Ox4, resting comfortably in bed. Pt on RA. No signs of SOB/respiratory distress. Labs, VS, medications reviewed.  Fall precautions in place. Bed at lowest position. Call light and personal belongings within reach. Plan of care discussed, no further concerns at this time.

## 2023-12-05 ENCOUNTER — PATIENT OUTREACH (OUTPATIENT)
Dept: MEDICAL GROUP | Facility: LAB | Age: 73
End: 2023-12-05
Payer: MEDICARE

## 2023-12-05 LAB
PSA FREE MFR SERPL: NORMAL %
PSA FREE SERPL-MCNC: NORMAL NG/ML
PSA SERPL-MCNC: 0.6 NG/ML (ref 0–4)

## 2023-12-06 ENCOUNTER — TELEPHONE (OUTPATIENT)
Dept: MEDICAL GROUP | Facility: LAB | Age: 73
End: 2023-12-06
Payer: MEDICARE

## 2023-12-06 NOTE — TELEPHONE ENCOUNTER
1. Caller Name: Maura Rivas                        Call Back Number: [phone]      How would the patient prefer to be contacted with a response:     Voicemail left regarding patient. He has been admitted to home health care for PT, OT and nursing. Had a fall in November on right hip. Has had surgery and is home now.

## 2024-01-18 ENCOUNTER — HOSPITAL ENCOUNTER (OUTPATIENT)
Dept: LAB | Facility: MEDICAL CENTER | Age: 74
End: 2024-01-18
Attending: STUDENT IN AN ORGANIZED HEALTH CARE EDUCATION/TRAINING PROGRAM
Payer: MEDICARE

## 2024-01-18 ENCOUNTER — OFFICE VISIT (OUTPATIENT)
Dept: MEDICAL GROUP | Facility: LAB | Age: 74
End: 2024-01-18
Payer: MEDICARE

## 2024-01-18 VITALS
HEIGHT: 68 IN | HEART RATE: 56 BPM | RESPIRATION RATE: 16 BRPM | DIASTOLIC BLOOD PRESSURE: 60 MMHG | TEMPERATURE: 98.9 F | BODY MASS INDEX: 22.92 KG/M2 | SYSTOLIC BLOOD PRESSURE: 100 MMHG | OXYGEN SATURATION: 100 % | WEIGHT: 151.24 LBS

## 2024-01-18 DIAGNOSIS — R71.8 OTHER ABNORMALITY OF RED BLOOD CELLS: ICD-10-CM

## 2024-01-18 DIAGNOSIS — Z00.00 MEDICARE ANNUAL WELLNESS VISIT, SUBSEQUENT: ICD-10-CM

## 2024-01-18 DIAGNOSIS — D64.89 ANEMIA DUE TO OTHER CAUSE, NOT CLASSIFIED: ICD-10-CM

## 2024-01-18 LAB
BASOPHILS # BLD AUTO: 0.3 % (ref 0–1.8)
BASOPHILS # BLD: 0.02 K/UL (ref 0–0.12)
EOSINOPHIL # BLD AUTO: 0.1 K/UL (ref 0–0.51)
EOSINOPHIL NFR BLD: 1.7 % (ref 0–6.9)
ERYTHROCYTE [DISTWIDTH] IN BLOOD BY AUTOMATED COUNT: 47.7 FL (ref 35.9–50)
HCT VFR BLD AUTO: 39.6 % (ref 42–52)
HGB BLD-MCNC: 13.2 G/DL (ref 14–18)
IMM GRANULOCYTES # BLD AUTO: 0.01 K/UL (ref 0–0.11)
IMM GRANULOCYTES NFR BLD AUTO: 0.2 % (ref 0–0.9)
LYMPHOCYTES # BLD AUTO: 1.13 K/UL (ref 1–4.8)
LYMPHOCYTES NFR BLD: 18.6 % (ref 22–41)
MCH RBC QN AUTO: 32 PG (ref 27–33)
MCHC RBC AUTO-ENTMCNC: 33.3 G/DL (ref 32.3–36.5)
MCV RBC AUTO: 96.1 FL (ref 81.4–97.8)
MONOCYTES # BLD AUTO: 0.63 K/UL (ref 0–0.85)
MONOCYTES NFR BLD AUTO: 10.4 % (ref 0–13.4)
NEUTROPHILS # BLD AUTO: 4.17 K/UL (ref 1.82–7.42)
NEUTROPHILS NFR BLD: 68.8 % (ref 44–72)
NRBC # BLD AUTO: 0 K/UL
NRBC BLD-RTO: 0 /100 WBC (ref 0–0.2)
PLATELET # BLD AUTO: 203 K/UL (ref 164–446)
PMV BLD AUTO: 10.4 FL (ref 9–12.9)
RBC # BLD AUTO: 4.12 M/UL (ref 4.7–6.1)
WBC # BLD AUTO: 6.1 K/UL (ref 4.8–10.8)

## 2024-01-18 PROCEDURE — 3074F SYST BP LT 130 MM HG: CPT | Performed by: STUDENT IN AN ORGANIZED HEALTH CARE EDUCATION/TRAINING PROGRAM

## 2024-01-18 PROCEDURE — 85025 COMPLETE CBC W/AUTO DIFF WBC: CPT

## 2024-01-18 PROCEDURE — 36415 COLL VENOUS BLD VENIPUNCTURE: CPT

## 2024-01-18 PROCEDURE — 3078F DIAST BP <80 MM HG: CPT | Performed by: STUDENT IN AN ORGANIZED HEALTH CARE EDUCATION/TRAINING PROGRAM

## 2024-01-18 PROCEDURE — 82728 ASSAY OF FERRITIN: CPT

## 2024-01-18 PROCEDURE — G0439 PPPS, SUBSEQ VISIT: HCPCS | Performed by: STUDENT IN AN ORGANIZED HEALTH CARE EDUCATION/TRAINING PROGRAM

## 2024-01-18 PROCEDURE — 83540 ASSAY OF IRON: CPT

## 2024-01-18 ASSESSMENT — PATIENT HEALTH QUESTIONNAIRE - PHQ9: CLINICAL INTERPRETATION OF PHQ2 SCORE: 0

## 2024-01-18 ASSESSMENT — ENCOUNTER SYMPTOMS: GENERAL WELL-BEING: EXCELLENT

## 2024-01-18 ASSESSMENT — FIBROSIS 4 INDEX: FIB4 SCORE: 2.65

## 2024-01-18 ASSESSMENT — ACTIVITIES OF DAILY LIVING (ADL): BATHING_REQUIRES_ASSISTANCE: 0

## 2024-01-18 NOTE — LETTER
[facility]  Brandon Magallanes D.O.  [address]  [place] [address]  Fax: [phone]   Authorization for Release/Disclosure of   Protected Health Information   Name: VASILE ARORA : 1950 SSN: [id number]   Address: [address] [place] [address] Phone:    [phone] (home)    I authorize the entity listed below to release/disclose the PHI below to:   [facility]/Brandon Magallanes D.O. and Brandon Magallanes D.O.   Provider or Entity Name:  [facility]   Address   City, [facility], [facility]   Phone:   [phone]    Fax:   [phone]   Reason for request: continuity of care   Information to be released:    [XX  ] LAST COLONOSCOPY,  including any PATH REPORT and follow-up  [  ] LAST FIT/COLOGUARD RESULT [  ] LAST DEXA  [  ] LAST MAMMOGRAM  [  ] LAST PAP  [  ] LAST LABS [  ] RETINA EXAM REPORT  [  ] IMMUNIZATION RECORDS  [  ] Release all info      [  ] Check here and initial the line next to each item to release ALL health information INCLUDING  _____ Care and treatment for drug and / or alcohol abuse  _____ HIV testing, infection status, or AIDS  _____ Genetic Testing    DATES OF SERVICE OR TIME PERIOD TO BE DISCLOSED: _____________  I understand and acknowledge that:  * This Authorization may be revoked at any time by you in writing, except if your health information has already been used or disclosed.  * Your health information that will be used or disclosed as a result of you signing this authorization could be re-disclosed by the recipient. If this occurs, your re-disclosed health information may no longer be protected by State or Federal laws.  * You may refuse to sign this Authorization. Your refusal will not affect your ability to obtain treatment.  * This Authorization becomes effective upon signing and will  on (date) __________.      If no date is indicated, this Authorization will  one (1) year from the signature date.    Name: Vasile Alpesh  Robinson  Signature:continuity of care Date:   1/18/2024     PLEASE FAX REQUESTED RECORDS BACK TO: [phone]

## 2024-01-18 NOTE — PROGRESS NOTES
Chief Complaint   Patient presents with    Annual Wellness Visit       HPI:  Vasile Bowers is a 73 y.o. here for Medicare Annual Wellness Visit. Patient currently in recovering from hip surgery he had in November.     Patient Active Problem List    Diagnosis Date Noted    Other specified anemias 01/18/2024    Elevated CPK 12/02/2023    Right elbow pain 12/01/2023    Periprosthetic fracture of hip, initial encounter 11/29/2023    Traumatic hematoma of hip, right, initial encounter 11/29/2023    Preoperative cardiovascular examination 11/29/2023    Acute blood loss anemia 11/29/2023    Hyperlipidemia 11/29/2023    Primary osteoarthritis of right hip 07/11/2023    Chronic right hip pain 07/11/2023    Risk for falls 03/14/2023    Injury of adductor muscle and tendon of thigh, initial encounter 01/24/2023    Sacroiliitis (HCC) 04/26/2022    Lumbar spondylosis 03/29/2022    Glenohumeral arthritis, unspecified laterality 03/29/2022    Bradycardia 12/07/2021    Coronary artery disease involving native coronary artery of native heart without angina pectoris 08/10/2021    Ascending aorta dilation (HCC)     Essential hypertension, benign     Thrombocytopenia (HCC) 07/25/2021    History of non-ST elevation myocardial infarction (NSTEMI) 07/25/2021    Lumbar stenosis 06/01/2021    Patellar tendinitis 07/16/2012    Bursitis, knee 06/25/2012    Bilateral shoulder pain 11/09/2009       Current Outpatient Medications   Medication Sig Dispense Refill    aspirin 81 MG EC tablet Take 1 Tablet by mouth 2 times a day. 100 Tablet     senna-docusate (PERICOLACE OR SENOKOT S) 8.6-50 MG Tab Take 1 Tablet by mouth every 8 hours.      gabapentin (NEURONTIN) 300 MG Cap Take 1 Capsule by mouth 2 times a day for 90 days. Take 300 mg twice a day. Increase by one every 4-5 days to a max of 3 in the morning and 3 at night (1800 mg/day (Patient taking differently: Take 600 mg by mouth 2 times a day.) 180 Capsule 0    meloxicam (MOBIC) 7.5 MG  Tab Take 1 Tablet by mouth every day. 30 Tablet 0    atorvastatin (LIPITOR) 80 MG tablet Take 1 Tablet by mouth every evening. 90 Tablet 3    VITAMIN K PO Take 1 Tablet by mouth every day.      Glucosamine-Chondroit-Vit C-Mn (GLUCOSAMINE 1500 COMPLEX PO) Take 1 Tablet by mouth every day.      Turmeric 500 MG Cap Take 1 Capsule by mouth every day.      Cholecalciferol (VITAMIN D3) Take 1 Capsule by mouth every day.      Ascorbic Acid (VITAMIN C PO) Take 1 tablet by mouth every day.      Cyanocobalamin (B-12 PO) Take 1 tablet by mouth every day.       No current facility-administered medications for this visit.          Current supplements as per medication list.     Allergies: Patient has no known allergies.    Current social contact/activities:      He  reports that he quit smoking about 46 years ago. His smoking use included cigarettes. He quit smokeless tobacco use about 52 years ago.  His smokeless tobacco use included chew. He reports that he does not currently use alcohol. He reports that he does not use drugs.  Counseling given: Not Answered      ROS:    Gait: Uses a walker  Ostomy: No  Other tubes: No  Amputations: No  Chronic oxygen use: No  Last eye exam: 2023  Wears hearing aids: No   : Denies any urinary leakage during the last 6 months    Screening:    Depression Screening  Little interest or pleasure in doing things?  0 - not at all  Feeling down, depressed , or hopeless? 0 - not at all  Patient Health Questionnaire Score: 0     If depressive symptoms identified deferred to follow up visit unless specifically addressed in assessment and plan.    Interpretation of PHQ-9 Total Score   Score Severity   1-4 No Depression   5-9 Mild Depression   10-14 Moderate Depression   15-19 Moderately Severe Depression   20-27 Severe Depression    Screening for Cognitive Impairment  Do you or any of your friends or family members have any concern about your memory? No  Three Minute Recall (Banana, Sunrise, Chair) 3/3     Alfredo clock face with all 12 numbers and set the hands to show 20 past 8.  No Got the time incorrect had 3 hands 8:00 and 8:20  Cognitive concerns identified deferred for follow up unless specifically addressed in assessment and plan.    Fall Risk Assessment  Has the patient had two or more falls in the last year or any fall with injury in the last year?  Yes    Safety Assessment  Do you always wear your seatbelt?  Yes  Any changes to home needed to function safely? No  Difficulty hearing.  No  Patient counseled about all safety risks that were identified.    Functional Assessment ADLs  Are there any barriers preventing you from cooking for yourself or meeting nutritional needs?  No.    Are there any barriers preventing you from driving safely or obtaining transportation?  No.    Are there any barriers preventing you from using a telephone or calling for help?  No    Are there any barriers preventing you from shopping?  No.    Are there any barriers preventing you from taking care of your own finances?  No    Are there any barriers preventing you from managing your medications?  No    Are there any barriers preventing you from showering, bathing or dressing yourself? No    Are there any barriers preventing you from doing housework or laundry? No  Are there any barriers preventing you from using the toilet?No  Are you currently engaging in any exercise or physical activity?  No.      Self-Assessment of Health  What is your perception of your health? Excellent  Do you sleep more than six hours a night? Yes  In the past 7 days, how much did pain keep you from doing your normal work? Some  Do you spend quality time with family or friends (virtually or in person)? Yes  Do you usually eat a heart healthy diet that constists of a variety of fruits, vegetables, whole grains and fiber? Yes  Do you eat foods high in fat and/or Fast Food more than three times per week? No    Advance Care Planning  Do you have an Advance  Directive, Living Will, Durable Power of , or POLST? Yes      Durable Power of    is not on file - instructed patient to bring in a copy to scan into their chart      Health Maintenance Summary            Overdue - Hepatitis C Screening (Once) Overdue - never done      No completion history exists for this topic.              Overdue - Hepatitis B Vaccine (Hep B) (3 of 3 - Hep B Twinrix 3-dose series) Overdue since 3/8/2004      10/06/2003  Imm Admin: Hep A/HEP B Combined Vaccine (TwinRix)    09/08/2003  Imm Admin: Hep A/HEP B Combined Vaccine (TwinRix)              Overdue - COVID-19 Vaccine (5 - 2023-24 season) Overdue since 9/1/2023 05/09/2022  Imm Admin: MODERNA SARS-COV-2 VACCINE (12+)    10/28/2021  Imm Admin: MODERNA SARS-COV-2 VACCINE (12+)    04/12/2021  Imm Admin: MODERNA SARS-COV-2 VACCINE (12+)    03/15/2021  Imm Admin: MODERNA SARS-COV-2 VACCINE (12+)              Postponed - Influenza Vaccine (1) Postponed until 1/18/2025      No completion history exists for this topic.              Annual Wellness Visit (Yearly) Next due on 1/18/2025 01/18/2024  Visit Dx: Medicare annual wellness visit, subsequent    11/14/2022 11/12/2021 11/11/2020                IMM DTaP/Tdap/Td Vaccine (2 - Td or Tdap) Next due on 2/8/2028 02/08/2018  Imm Admin: Tdap Vaccine    09/08/2003  Imm Admin: TD Vaccine              Colorectal Cancer Screening (Colonoscopy - Every 10 Years) Next due on 1/31/2032 01/31/2022  Colonoscopy (Reason not specified - per outiside records)              Hepatitis A Vaccine (Hep A) (Series Information) Aged Out      10/06/2003  Imm Admin: Hep A/HEP B Combined Vaccine (TwinRix)    09/08/2003  Imm Admin: Hep A/HEP B Combined Vaccine (TwinRix)              Pneumococcal Vaccine: 65+ Years (Series Information) Completed      05/01/2019  Imm Admin: Pneumococcal polysaccharide vaccine (PPSV-23)    02/08/2018  Imm Admin: Pneumococcal Conjugate Vaccine  (Prevnar/PCV-13)              Abdominal Aortic Aneurysm (AAA) Screening  Tentatively Complete      2021  CT-CTA COMPLETE THORACOABDOMINAL AORTA    2018  US-ABDOMINAL AORTA W/O DOPPLER              Zoster (Shingles) Vaccines (Series Information) Completed      2022  Imm Admin: Zoster Vaccine Recombinant (RZV) (SHINGRIX)    2021  Imm Admin: Zoster Vaccine Recombinant (RZV) (SHINGRIX)              HPV Vaccines (Series Information) Aged Out      No completion history exists for this topic.              Polio Vaccine (Inactivated Polio) (Series Information) Aged Out      No completion history exists for this topic.              Meningococcal Immunization (Series Information) Aged Out      No completion history exists for this topic.                    Patient Care Team:  Brandon Magallanes D.O. as PCP - General (Internal Medicine)        Social History     Tobacco Use    Smoking status: Former     Current packs/day: 0.00     Types: Cigarettes     Quit date:      Years since quittin.0    Smokeless tobacco: Former     Types: Chew     Quit date:    Vaping Use    Vaping Use: Never used   Substance Use Topics    Alcohol use: Not Currently    Drug use: No     Family History   Problem Relation Age of Onset    Heart Disease Mother     Heart Failure Mother      He  has a past medical history of Ascending aorta dilation (HCC), CAD (coronary artery disease), Chronic pain, High cholesterol, and Myocardial infarct (HCC).   Past Surgical History:   Procedure Laterality Date    HIP REVISION TOTAL Right 2023    Procedure: REVISION, TOTAL ARTHROPLASTY, HIP, ORIF GREATER TROCHANTER;  Surgeon: Clark Clement M.D.;  Location: SURGERY [facility];  Service: Orthopedics    MT TOTAL HIP ARTHROPLASTY Right 2023    Procedure: RIGHT TOTAL HIP ARTHROPLASTY;  Surgeon: Clark Clement M.D.;  Location: [facility];  Service: Orthopedics    PB INJECT RX OTHER PERIPH NERVE Right 2023     "Procedure: RIGHT hip acetabular neurotomy of the femoral and obturator sensory nerves with fluoroscopic guidance with sedation.;  Surgeon: Brooke Brown M.D.;  [facility];  Service: Pain Management    CT INJ(S) NERVE BLOCK FEMORAL (INCLUDES IG) Right 5/2/2023    Procedure: Diagnostic RIGHT femoral and obturator nerve blocks with fluoroscopic guidance;  Surgeon: Brooke Brown M.D.;  [facility];  Service: Pain Management    CT DRAIN/INJECT LARGE JOINT/BURSA Right 4/11/2023    Procedure: Diagnostic and therapeutic Fluoroscopically guided intra-articular RIGHT hip injection;  Surgeon: Brooke Brown M.D.;  Location: [facility];  Service: Pain Management    INJ,ANES LUMBAR/CERVICAL Right 3/21/2023    Procedure: Diagnostic medial branch blocks targeting the RIGHT L4-5 and L5-S1 facet joints with fluoroscopic guidance #1;  Surgeon: Brooke Brown M.D.;  Location: [facility];  Service: Pain Management    IRRIGATION & DEBRIDEMENT ORTHO Left 12/8/2021    Procedure: IRRIGATION AND DEBRIDEMENT, WOUND - HAND;  Surgeon: Guillermo Villalobos M.D.;  Location: SURGERY [facility];  Service: Orthopedics    [facility] CARDIAC CATH  07/2021    PCI to OM1     KNEE ARTHROSCOPY      SHOULDER ARTHROSCOPY         Exam:   /60 (BP Location: Right arm, Patient Position: Sitting, BP Cuff Size: Adult)   Pulse (!) 56   Temp 37.2 °C (98.9 °F)   Resp 16   Ht 1.727 m (5' 8\")   Wt 68.6 kg (151 lb 3.8 oz)   SpO2 100%  Body mass index is 23 kg/m².    Hearing fair.    Dentition fair  Alert, oriented in no acute distress.  Eye contact is good, speech goal directed, affect calm    Assessment and Plan. The following treatment and monitoring plan is recommended:    1. Anemia due to other cause, not classified  - CBC WITH DIFFERENTIAL; Future  - FERRITIN; Future  - IRON; Future    2. Other abnormality of red blood cells  - FERRITIN; Future    3. " Medicare annual wellness visit, subsequent      Services suggested: No services needed at this time  Health Care Screening: Age-appropriate preventive services recommended by USPTF and ACIP covered by Medicare were discussed today. Services ordered if indicated and agreed upon by the patient.  Referrals offered: Community-based lifestyle interventions to reduce health risks and promote self-management and wellness, fall prevention, nutrition, physical activity, tobacco-use cessation, weight loss, and mental health services as per orders if indicated.    Discussion today about general wellness and lifestyle habits:    Prevent falls and reduce trip hazards; Cautioned about securing or removing rugs.  Have a working fire alarm and carbon monoxide detector;   Engage in regular physical activity and social activities     Follow-up: 1 year medicare annual

## 2024-01-19 LAB
FERRITIN SERPL-MCNC: 93.2 NG/ML (ref 22–322)
IRON SERPL-MCNC: 36 UG/DL (ref 50–180)

## 2024-02-19 ENCOUNTER — HOSPITAL ENCOUNTER (EMERGENCY)
Facility: MEDICAL CENTER | Age: 74
End: 2024-02-19
Attending: STUDENT IN AN ORGANIZED HEALTH CARE EDUCATION/TRAINING PROGRAM
Payer: MEDICARE

## 2024-02-19 VITALS
HEIGHT: 68 IN | HEART RATE: 61 BPM | SYSTOLIC BLOOD PRESSURE: 118 MMHG | OXYGEN SATURATION: 100 % | DIASTOLIC BLOOD PRESSURE: 68 MMHG | TEMPERATURE: 98.9 F | BODY MASS INDEX: 22.75 KG/M2 | WEIGHT: 150.13 LBS | RESPIRATION RATE: 13 BRPM

## 2024-02-19 DIAGNOSIS — S01.81XA FACIAL LACERATION, INITIAL ENCOUNTER: ICD-10-CM
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
## 2024-02-19 PROCEDURE — A9270 NON-COVERED ITEM OR SERVICE: HCPCS | Performed by: STUDENT IN AN ORGANIZED HEALTH CARE EDUCATION/TRAINING PROGRAM

## 2024-02-19 PROCEDURE — 700102 HCHG RX REV CODE 250 W/ 637 OVERRIDE(OP): Performed by: STUDENT IN AN ORGANIZED HEALTH CARE EDUCATION/TRAINING PROGRAM

## 2024-02-19 PROCEDURE — 99284 EMERGENCY DEPT VISIT MOD MDM: CPT

## 2024-02-19 RX ORDER — AMOXICILLIN AND CLAVULANATE POTASSIUM 875; 125 MG/1; MG/1
1 TABLET, FILM COATED ORAL ONCE
Status: COMPLETED | OUTPATIENT
Start: 2024-02-19 | End: 2024-02-19

## 2024-02-19 RX ORDER — AMOXICILLIN AND CLAVULANATE POTASSIUM 875; 125 MG/1; MG/1
1 TABLET, FILM COATED ORAL 2 TIMES DAILY
Qty: 10 TABLET | Refills: 0 | Status: ACTIVE | OUTPATIENT
Start: 2024-02-19 | End: 2024-02-24

## 2024-02-19 RX ADMIN — AMOXICILLIN AND CLAVULANATE POTASSIUM 1 TABLET: 875; 125 TABLET, FILM COATED ORAL at 19:57

## 2024-02-19 ASSESSMENT — FIBROSIS 4 INDEX: FIB4 SCORE: 1.95

## 2024-02-20 NOTE — ED PROVIDER NOTES
"CHIEF COMPLAINT  Chief Complaint   Patient presents with    Laceration     To L chin. States \"a rooster got me\".        LIMITATION TO HISTORY   Select:     HPI    Vasile Bowers is a 73 y.o. male who presents to the Emergency Department for evaluation of being bitten by a chicken on the chin reports the chicken flew up towards his face he believes it bit him on the chin he is unsure if it was a pack or scratch    OUTSIDE HISTORIAN(S):  Select:    EXTERNAL RECORDS REVIEWED  Select:       PAST MEDICAL HISTORY  Past Medical History:   Diagnosis Date    Ascending aorta dilation (HCC)     CAD (coronary artery disease)     Chronic pain     High cholesterol     Myocardial infarct (HCC)      .    SURGICAL HISTORY  Past Surgical History:   Procedure Laterality Date    HIP REVISION TOTAL Right 11/30/2023    Procedure: REVISION, TOTAL ARTHROPLASTY, HIP, ORIF GREATER TROCHANTER;  Surgeon: Clark Clement M.D.;  Location: [facility];  Service: Orthopedics    UT TOTAL HIP ARTHROPLASTY Right 8/14/2023    Procedure: RIGHT TOTAL HIP ARTHROPLASTY;  Surgeon: Clark Clement M.D.;  Location: [facility] [place];  Service: Orthopedics    PB INJECT RX OTHER PERIPH NERVE Right 5/30/2023    Procedure: RIGHT hip acetabular neurotomy of the femoral and obturator sensory nerves with fluoroscopic guidance with sedation.;  Surgeon: Brooke Brown M.D.;  Location: [facility];  Service: Pain Management    UT INJ(S) NERVE BLOCK FEMORAL (INCLUDES IG) Right 5/2/2023    Procedure: Diagnostic RIGHT femoral and obturator nerve blocks with fluoroscopic guidance;  Surgeon: Brooke Brown M.D.;  Location: [facility];  Service: Pain Management    UT DRAIN/INJECT LARGE JOINT/BURSA Right 4/11/2023    Procedure: Diagnostic and therapeutic Fluoroscopically guided intra-articular RIGHT hip injection;  Surgeon: Brooke Brown M.D.;  Location: [facility];  Service: Pain " Management    INJ,ANES LUMBAR/CERVICAL Right 3/21/2023    Procedure: Diagnostic medial branch blocks targeting the RIGHT L4-5 and L5-S1 facet joints with fluoroscopic guidance #1;  Surgeon: Brooke Brown M.D.;  Location: [facility];  Service: Pain Management    IRRIGATION & DEBRIDEMENT ORTHO Left 2021    Procedure: IRRIGATION AND DEBRIDEMENT, WOUND - HAND;  Surgeon: Guillermo Villalobos M.D.;  Location: SURGERY [facility];  Service: Orthopedics    [facility] CARDIAC CATH  2021    PCI to OM1     KNEE ARTHROSCOPY      SHOULDER ARTHROSCOPY           FAMILY HISTORY  Family History   Problem Relation Age of Onset    Heart Disease Mother     Heart Failure Mother           SOCIAL HISTORY  Social History     Socioeconomic History    Marital status:      Spouse name: Not on file    Number of children: Not on file    Years of education: Not on file    Highest education level: Not on file   Occupational History    Not on file   Tobacco Use    Smoking status: Former     Current packs/day: 0.00     Types: Cigarettes     Quit date:      Years since quittin.1    Smokeless tobacco: Former     Types: Chew     Quit date:    Vaping Use    Vaping Use: Never used   Substance and Sexual Activity    Alcohol use: Not Currently    Drug use: No    Sexual activity: Not on file   Other Topics Concern    Not on file   Social History Narrative    Not on file     Social Determinants of Health     Financial Resource Strain: Not on file   Food Insecurity: Not on file   Transportation Needs: Not on file   Physical Activity: Not on file   Stress: Not on file   Social Connections: Not on file   Intimate Partner Violence: Not on file   Housing Stability: Not on file         CURRENT MEDICATIONS  No current facility-administered medications on file prior to encounter.     Current Outpatient Medications on File Prior to Encounter   Medication Sig Dispense Refill    gabapentin (NEURONTIN) 300 MG Cap Take 1  "Capsule by mouth 2 times a day for 90 days. Take 300 mg twice a day. Increase by one every 4-5 days to a max of 3 in the morning and 3 at night (1800 mg/day 180 Capsule 0    aspirin 81 MG EC tablet Take 1 Tablet by mouth 2 times a day. 100 Tablet     senna-docusate (PERICOLACE OR SENOKOT S) 8.6-50 MG Tab Take 1 Tablet by mouth every 8 hours.      meloxicam (MOBIC) 7.5 MG Tab Take 1 Tablet by mouth every day. 30 Tablet 0    atorvastatin (LIPITOR) 80 MG tablet Take 1 Tablet by mouth every evening. 90 Tablet 3    VITAMIN K PO Take 1 Tablet by mouth every day.      Glucosamine-Chondroit-Vit C-Mn (GLUCOSAMINE 1500 COMPLEX PO) Take 1 Tablet by mouth every day.      Turmeric 500 MG Cap Take 1 Capsule by mouth every day.      Cholecalciferol (VITAMIN D3) Take 1 Capsule by mouth every day.      Ascorbic Acid (VITAMIN C PO) Take 1 tablet by mouth every day.      Cyanocobalamin (B-12 PO) Take 1 tablet by mouth every day.             ALLERGIES  No Known Allergies    PHYSICAL EXAM  VITAL SIGNS:/68   Pulse 61   Temp 37.2 °C (98.9 °F) (Temporal)   Resp 13   Ht 1.727 m (5' 8\")   Wt 68.1 kg (150 lb 2.1 oz)   SpO2 100%   BMI 22.83 kg/m²       GENERAL: Awake and alert  HEAD: Normocephalic and atraumatic  NECK: Normal range of motion  EYES: PERRL, + EOMI, conjunctiva white  ENT: Mucous membranes moist, oropharynx clear  PULMONARY: Normal effort  CARDIOVASCULAR: Normal rate, peripheral pulses 2+  ABDOMEN: Soft  BACK: normal to inspection  NEUROLOGICAL: Grossly non-focal neurological examination, speech normal, gait normal  EXTREMITIES: No edema, no signs of extremity trauma  SKIN: Warm and dry. 3 cm linear vertical abrasion on the chin   PSYCHIATRIC: Affect is appropriate present          COURSE & MEDICAL DECISION MAKING    ED COURSE:        INTERVENTIONS BY ME:  Medications   amoxicillin-clavulanate (Augmentin) 875-125 MG per tablet 1 Tablet (1 Tablet Oral Given 2/19/24 1957)       Response on recheck:      INITIAL " ASSESSMENT, COURSE AND PLAN  Care Narrative:   Patient presenting after being attacked by a chicken, recommend healing by secondary intention for abrasion/superficial laceration on the face under the chin.  No other trauma.  Discussed with pharmacist and will prescribe Augmentin           ADDITIONAL PROBLEM LIST    DISPOSITION AND DISCUSSIONS  Discussion of management with other QHP or appropriate source(s): Spoke to pharmacist in the ER regarding antibiotics he recommends Augmentin        Decision tools and prescription drugs considered including, but not limited to: Prescribed Augmentin    FINAL DIAGNOSIS  1. Facial laceration, initial encounter    2. Pecked by chicken, initial encounter             Electronically signed by: James Roberts DO ,11:51 PM 02/19/24

## 2024-02-20 NOTE — ED TRIAGE NOTES
"Chief Complaint   Patient presents with    Laceration     To L chin. States \"a rooster got me\".      Physical Exam  Pulmonary:      Effort: Pulmonary effort is normal.   Skin:     General: Skin is warm and dry.   Neurological:      Mental Status: He is alert.         "

## 2024-04-02 ENCOUNTER — APPOINTMENT (RX ONLY)
Dept: URBAN - METROPOLITAN AREA CLINIC 6 | Facility: CLINIC | Age: 74
Setting detail: DERMATOLOGY
End: 2024-04-02

## 2024-04-02 DIAGNOSIS — Z71.89 OTHER SPECIFIED COUNSELING: ICD-10-CM

## 2024-04-02 DIAGNOSIS — Z85.828 PERSONAL HISTORY OF OTHER MALIGNANT NEOPLASM OF SKIN: ICD-10-CM

## 2024-04-02 DIAGNOSIS — L57.0 ACTINIC KERATOSIS: ICD-10-CM

## 2024-04-02 DIAGNOSIS — D22 MELANOCYTIC NEVI: ICD-10-CM

## 2024-04-02 DIAGNOSIS — D18.0 HEMANGIOMA: ICD-10-CM

## 2024-04-02 DIAGNOSIS — L82.1 OTHER SEBORRHEIC KERATOSIS: ICD-10-CM

## 2024-04-02 DIAGNOSIS — L81.4 OTHER MELANIN HYPERPIGMENTATION: ICD-10-CM

## 2024-04-02 DIAGNOSIS — D485 NEOPLASM OF UNCERTAIN BEHAVIOR OF SKIN: ICD-10-CM

## 2024-04-02 PROBLEM — D22.61 MELANOCYTIC NEVI OF RIGHT UPPER LIMB, INCLUDING SHOULDER: Status: ACTIVE | Noted: 2024-04-02

## 2024-04-02 PROBLEM — D22.71 MELANOCYTIC NEVI OF RIGHT LOWER LIMB, INCLUDING HIP: Status: ACTIVE | Noted: 2024-04-02

## 2024-04-02 PROBLEM — D22.72 MELANOCYTIC NEVI OF LEFT LOWER LIMB, INCLUDING HIP: Status: ACTIVE | Noted: 2024-04-02

## 2024-04-02 PROBLEM — D22.5 MELANOCYTIC NEVI OF TRUNK: Status: ACTIVE | Noted: 2024-04-02

## 2024-04-02 PROBLEM — D18.01 HEMANGIOMA OF SKIN AND SUBCUTANEOUS TISSUE: Status: ACTIVE | Noted: 2024-04-02

## 2024-04-02 PROBLEM — D48.5 NEOPLASM OF UNCERTAIN BEHAVIOR OF SKIN: Status: ACTIVE | Noted: 2024-04-02

## 2024-04-02 PROBLEM — D22.62 MELANOCYTIC NEVI OF LEFT UPPER LIMB, INCLUDING SHOULDER: Status: ACTIVE | Noted: 2024-04-02

## 2024-04-02 PROCEDURE — 17004 DESTROY PREMAL LESIONS 15/>: CPT

## 2024-04-02 PROCEDURE — ? BIOPSY BY SHAVE METHOD

## 2024-04-02 PROCEDURE — 11102 TANGNTL BX SKIN SINGLE LES: CPT | Mod: 59

## 2024-04-02 PROCEDURE — 99213 OFFICE O/P EST LOW 20 MIN: CPT | Mod: 25

## 2024-04-02 PROCEDURE — ? LIQUID NITROGEN

## 2024-04-02 PROCEDURE — ? COUNSELING

## 2024-04-02 ASSESSMENT — LOCATION DETAILED DESCRIPTION DERM
LOCATION DETAILED: RIGHT SUPERIOR LATERAL BUCCAL CHEEK
LOCATION DETAILED: RIGHT ANTERIOR PROXIMAL THIGH
LOCATION DETAILED: RIGHT MID-UPPER BACK
LOCATION DETAILED: RIGHT SUPERIOR CENTRAL BUCCAL CHEEK
LOCATION DETAILED: LEFT DISTAL POSTERIOR THIGH
LOCATION DETAILED: RIGHT DISTAL POSTERIOR THIGH
LOCATION DETAILED: RIGHT VENTRAL PROXIMAL FOREARM
LOCATION DETAILED: RIGHT ANTERIOR DISTAL UPPER ARM
LOCATION DETAILED: RIGHT SUPERIOR OCCIPITAL SCALP
LOCATION DETAILED: LEFT SUPERIOR CENTRAL MALAR CHEEK
LOCATION DETAILED: LEFT PROXIMAL CALF
LOCATION DETAILED: RIGHT SUPERIOR FOREHEAD
LOCATION DETAILED: RIGHT SUPERIOR PARIETAL SCALP
LOCATION DETAILED: LEFT OCCIPITAL SCALP
LOCATION DETAILED: LEFT PROXIMAL DORSAL FOREARM
LOCATION DETAILED: LEFT INFERIOR FOREHEAD
LOCATION DETAILED: RIGHT PROXIMAL DORSAL FOREARM
LOCATION DETAILED: RIGHT SUPERIOR PREAURICULAR CHEEK
LOCATION DETAILED: RIGHT SUPERIOR CRUS OF ANTIHELIX
LOCATION DETAILED: RIGHT CENTRAL TEMPLE
LOCATION DETAILED: RIGHT CENTRAL FRONTAL SCALP
LOCATION DETAILED: RIGHT MEDIAL INFERIOR CHEST
LOCATION DETAILED: RIGHT VENTRAL DISTAL FOREARM
LOCATION DETAILED: RIGHT SUPERIOR LATERAL MIDBACK
LOCATION DETAILED: RIGHT PROXIMAL CALF
LOCATION DETAILED: LEFT VENTRAL DISTAL FOREARM
LOCATION DETAILED: RIGHT CENTRAL PARIETAL SCALP
LOCATION DETAILED: LEFT ANTERIOR PROXIMAL THIGH
LOCATION DETAILED: MID-OCCIPITAL SCALP
LOCATION DETAILED: LEFT CENTRAL MALAR CHEEK
LOCATION DETAILED: LEFT INFERIOR UPPER BACK
LOCATION DETAILED: LEFT FOREHEAD
LOCATION DETAILED: LEFT ANTERIOR DISTAL UPPER ARM
LOCATION DETAILED: LEFT CENTRAL ZYGOMA

## 2024-04-02 ASSESSMENT — LOCATION ZONE DERM
LOCATION ZONE: ARM
LOCATION ZONE: LEG
LOCATION ZONE: SCALP
LOCATION ZONE: EAR
LOCATION ZONE: TRUNK
LOCATION ZONE: FACE

## 2024-04-02 ASSESSMENT — LOCATION SIMPLE DESCRIPTION DERM
LOCATION SIMPLE: RIGHT FOREARM
LOCATION SIMPLE: LEFT UPPER ARM
LOCATION SIMPLE: LEFT THIGH
LOCATION SIMPLE: RIGHT UPPER ARM
LOCATION SIMPLE: SCALP
LOCATION SIMPLE: LEFT FOREARM
LOCATION SIMPLE: CHEST
LOCATION SIMPLE: RIGHT UPPER BACK
LOCATION SIMPLE: RIGHT POSTERIOR THIGH
LOCATION SIMPLE: LEFT CHEEK
LOCATION SIMPLE: RIGHT LOWER BACK
LOCATION SIMPLE: RIGHT THIGH
LOCATION SIMPLE: RIGHT TEMPLE
LOCATION SIMPLE: RIGHT CALF
LOCATION SIMPLE: RIGHT CHEEK
LOCATION SIMPLE: LEFT ZYGOMA
LOCATION SIMPLE: LEFT CALF
LOCATION SIMPLE: LEFT UPPER BACK
LOCATION SIMPLE: LEFT POSTERIOR THIGH
LOCATION SIMPLE: POSTERIOR SCALP
LOCATION SIMPLE: RIGHT EAR
LOCATION SIMPLE: RIGHT FOREHEAD
LOCATION SIMPLE: LEFT FOREHEAD
LOCATION SIMPLE: RIGHT OCCIPITAL SCALP

## 2024-04-02 NOTE — PROCEDURE: LIQUID NITROGEN
Detail Level: Detailed
Show Applicator Variable?: Yes
Render Note In Bullet Format When Appropriate: No
Duration Of Freeze Thaw-Cycle (Seconds): 3
Post-Care Instructions: I reviewed with the patient in detail post-care instructions. Patient is to wear sunprotection, and avoid picking at any of the treated lesions. Pt may apply Vaseline to crusted or scabbing areas.
Number Of Freeze-Thaw Cycles: 3 freeze-thaw cycles
Consent: The patient's consent was obtained including but not limited to risks of crusting, scabbing, blistering, scarring, darker or lighter pigmentary change, recurrence, incomplete removal and infection.

## 2024-05-07 ENCOUNTER — APPOINTMENT (RX ONLY)
Dept: URBAN - METROPOLITAN AREA CLINIC 6 | Facility: CLINIC | Age: 74
Setting detail: DERMATOLOGY
End: 2024-05-07

## 2024-05-07 DIAGNOSIS — L57.0 ACTINIC KERATOSIS: ICD-10-CM

## 2024-05-07 DIAGNOSIS — Z71.89 OTHER SPECIFIED COUNSELING: ICD-10-CM

## 2024-05-07 DIAGNOSIS — L82.1 OTHER SEBORRHEIC KERATOSIS: ICD-10-CM

## 2024-05-07 PROCEDURE — ? LIQUID NITROGEN

## 2024-05-07 PROCEDURE — 17004 DESTROY PREMAL LESIONS 15/>: CPT

## 2024-05-07 PROCEDURE — ? SUNSCREEN RECOMMENDATIONS

## 2024-05-07 PROCEDURE — ? COUNSELING

## 2024-05-07 PROCEDURE — 99212 OFFICE O/P EST SF 10 MIN: CPT | Mod: 25

## 2024-05-07 ASSESSMENT — LOCATION DETAILED DESCRIPTION DERM
LOCATION DETAILED: LEFT INFERIOR LATERAL MALAR CHEEK
LOCATION DETAILED: LEFT CENTRAL PARIETAL SCALP
LOCATION DETAILED: LEFT LATERAL TEMPLE
LOCATION DETAILED: RIGHT SUPERIOR LATERAL BUCCAL CHEEK
LOCATION DETAILED: RIGHT MID PREAURICULAR CHEEK
LOCATION DETAILED: LEFT LATERAL ZYGOMA
LOCATION DETAILED: RIGHT SUPERIOR FOREHEAD
LOCATION DETAILED: LEFT SUPERIOR OCCIPITAL SCALP
LOCATION DETAILED: RIGHT CENTRAL BUCCAL CHEEK
LOCATION DETAILED: RIGHT CENTRAL TEMPLE
LOCATION DETAILED: RIGHT INFERIOR LATERAL MALAR CHEEK
LOCATION DETAILED: LEFT SUPERIOR PARIETAL SCALP
LOCATION DETAILED: RIGHT CENTRAL PARIETAL SCALP
LOCATION DETAILED: RIGHT FOREHEAD
LOCATION DETAILED: RIGHT SUPERIOR PARIETAL SCALP
LOCATION DETAILED: RIGHT INFERIOR FOREHEAD
LOCATION DETAILED: LEFT SUPERIOR CENTRAL MALAR CHEEK
LOCATION DETAILED: RIGHT SUPERIOR PREAURICULAR CHEEK
LOCATION DETAILED: RIGHT INFERIOR CENTRAL MALAR CHEEK

## 2024-05-07 ASSESSMENT — LOCATION SIMPLE DESCRIPTION DERM
LOCATION SIMPLE: LEFT TEMPLE
LOCATION SIMPLE: POSTERIOR SCALP
LOCATION SIMPLE: SCALP
LOCATION SIMPLE: LEFT CHEEK
LOCATION SIMPLE: RIGHT TEMPLE
LOCATION SIMPLE: RIGHT FOREHEAD
LOCATION SIMPLE: LEFT ZYGOMA
LOCATION SIMPLE: RIGHT CHEEK

## 2024-05-07 ASSESSMENT — LOCATION ZONE DERM
LOCATION ZONE: SCALP
LOCATION ZONE: FACE

## 2024-05-07 NOTE — PROCEDURE: LIQUID NITROGEN
Post-Care Instructions: I reviewed with the patient in detail post-care instructions. Patient is to wear sunprotection, and avoid picking at any of the treated lesions. Pt may apply Vaseline to crusted or scabbing areas.
Render Post-Care Instructions In Note?: no
Show Applicator Variable?: Yes
Number Of Freeze-Thaw Cycles: 3 freeze-thaw cycles
Detail Level: Detailed
Consent: The patient's consent was obtained including but not limited to risks of crusting, scabbing, blistering, scarring, darker or lighter pigmentary change, recurrence, incomplete removal and infection.
Duration Of Freeze Thaw-Cycle (Seconds): 3

## 2024-06-14 DIAGNOSIS — I25.10 CORONARY ARTERY DISEASE INVOLVING NATIVE CORONARY ARTERY OF NATIVE HEART WITHOUT ANGINA PECTORIS: ICD-10-CM

## 2024-06-14 NOTE — TELEPHONE ENCOUNTER
Is the patient due for a refill? Yes    Was the patient seen the past year? Yes    Date of last office visit: 05/09/2024    Does the patient have an upcoming appointment?  No   If yes, When?     Provider to refill:AM    Does the patients insurance require a 100 day supply?  No

## 2024-06-17 RX ORDER — ATORVASTATIN CALCIUM 80 MG/1
80 TABLET, FILM COATED ORAL EVERY EVENING
Qty: 90 TABLET | Refills: 0 | Status: SHIPPED | OUTPATIENT
Start: 2024-06-17

## 2024-07-12 ENCOUNTER — APPOINTMENT (OUTPATIENT)
Dept: RADIOLOGY | Facility: MEDICAL CENTER | Age: 74
End: 2024-07-12
Attending: ORTHOPAEDIC SURGERY
Payer: MEDICARE

## 2024-07-12 ENCOUNTER — HOSPITAL ENCOUNTER (OUTPATIENT)
Dept: RADIOLOGY | Facility: MEDICAL CENTER | Age: 74
End: 2024-07-12
Attending: NEUROLOGICAL SURGERY
Payer: MEDICARE

## 2024-07-12 DIAGNOSIS — M43.10 ISTHMIC SPONDYLOLISTHESIS: ICD-10-CM

## 2024-07-12 DIAGNOSIS — M48.062 SPINAL STENOSIS OF LUMBAR REGION WITH NEUROGENIC CLAUDICATION: ICD-10-CM

## 2024-07-12 DIAGNOSIS — M54.16 LUMBAR RADICULOPATHY: ICD-10-CM

## 2024-07-12 DIAGNOSIS — M54.50 LOW BACK PAIN, UNSPECIFIED BACK PAIN LATERALITY, UNSPECIFIED CHRONICITY, UNSPECIFIED WHETHER SCIATICA PRESENT: ICD-10-CM

## 2024-07-12 DIAGNOSIS — Z96.641 HISTORY OF TOTAL RIGHT HIP REPLACEMENT: ICD-10-CM

## 2024-07-12 DIAGNOSIS — M25.551 PAIN OF RIGHT HIP: ICD-10-CM

## 2024-07-12 PROCEDURE — 73721 MRI JNT OF LWR EXTRE W/O DYE: CPT

## 2024-07-12 PROCEDURE — 72148 MRI LUMBAR SPINE W/O DYE: CPT

## 2024-09-17 DIAGNOSIS — I25.10 CORONARY ARTERY DISEASE INVOLVING NATIVE CORONARY ARTERY OF NATIVE HEART WITHOUT ANGINA PECTORIS: ICD-10-CM

## 2024-09-17 RX ORDER — ATORVASTATIN CALCIUM 80 MG/1
80 TABLET, FILM COATED ORAL EVERY EVENING
Qty: 90 TABLET | Refills: 0 | OUTPATIENT
Start: 2024-09-17

## 2024-09-17 NOTE — TELEPHONE ENCOUNTER
Is the patient due for a refill? No    Was the patient seen the past year? No    Date of last office visit: 05/09/2023    Does the patient have an upcoming appointment?  No   If yes, When?     Provider to refill:AM    Does the patient have nursing home Plus and need 100-day supply? (This applies to ALL medications) Patient does not have SCP

## 2024-09-24 NOTE — PROGRESS NOTES
Chief Complaint   Patient presents with    Coronary Artery Disease     Follow up         Subjective:   Vasile Bowers is a 74 y.o. male who presents today for follow-up.     Patient of Dr. Benson.  Current medical problems include coronary artery disease with NSTEMI in  due to single-vessel coronary artery disease status post PCI to OM1 in , hyperlipidemia, mild ascending aortic dilation.  Last clinic visit was May 2023, here for late annual follow-up.    Continues to do well, exercises regularly without limitations.       Past Medical History:   Diagnosis Date    Ascending aorta dilation (HCC)     CAD (coronary artery disease)     Chronic pain     High cholesterol     Myocardial infarct (HCC)          Family History   Problem Relation Age of Onset    Heart Disease Mother     Heart Failure Mother          Social History     Tobacco Use    Smoking status: Former     Current packs/day: 0.00     Types: Cigarettes     Quit date:      Years since quittin.7    Smokeless tobacco: Former     Types: Chew     Quit date:    Vaping Use    Vaping status: Never Used   Substance Use Topics    Alcohol use: Not Currently    Drug use: No         No Known Allergies      Current Outpatient Medications   Medication Sig    Calcium Acetate, Phos Binder, (CALCIUM ACETATE PO) Take  by mouth.    B Complex Vitamins (B COMPLEX PO) Take  by mouth.    aspirin 81 MG EC tablet Take 1 Tablet by mouth every day.    atorvastatin (LIPITOR) 80 MG tablet Take 1 Tablet by mouth every evening.    VITAMIN K PO Take 1 Tablet by mouth every day.    Glucosamine-Chondroit-Vit C-Mn (GLUCOSAMINE 1500 COMPLEX PO) Take 1 Tablet by mouth every day.    Turmeric 500 MG Cap Take 1 Capsule by mouth every day.    Cholecalciferol (VITAMIN D3) Take 1 Capsule by mouth every day.    Ascorbic Acid (VITAMIN C PO) Take 1 tablet by mouth every day.         Review of Systems   Respiratory:  Negative for shortness of breath.    Cardiovascular:  Negative  "for chest pain, palpitations, orthopnea, leg swelling and PND.   Neurological:  Negative for dizziness.          Objective:   /68 (BP Location: Left arm, Patient Position: Sitting)   Pulse (!) 46   Resp 16   Ht 1.753 m (5' 9\")   Wt 68.9 kg (152 lb)   SpO2 99%  Body mass index is 22.45 kg/m².         Physical Exam  Vitals reviewed.   HENT:      Head: Normocephalic and atraumatic.   Cardiovascular:      Rate and Rhythm: Normal rate and regular rhythm.      Heart sounds: No murmur heard.  Pulmonary:      Effort: Pulmonary effort is normal. No respiratory distress.      Breath sounds: No rales.   Musculoskeletal:      Right lower leg: No edema.      Left lower leg: No edema.   Skin:     General: Skin is warm.   Neurological:      Mental Status: He is alert.      Gait: Gait normal.   Psychiatric:         Judgment: Judgment normal.            Assessment:     1. Coronary artery disease involving native coronary artery of native heart without angina pectoris  aspirin 81 MG EC tablet    atorvastatin (LIPITOR) 80 MG tablet    Lipid Profile      2. Stented coronary artery        3. Ascending aorta dilation (HCC)             Medical Decision Making:  Today's Assessment / Plan:   Ischemic Heart Disease, Stable  - S/P PCI to OM in 2021  - no angina, exercises regularly. Cont aspirin and statin. Recheck lipids this year    Aortic dilatation, mild  - 2023: The ascending aorta measures 4.0 x 4.0 cm as compared to 4.2 x 4.1 cm in 2021. Consider repeating imaging in 2025    Return in about 1 year (around 9/25/2025) for follow up with Dr Benson.  Sooner if problems.  "

## 2024-09-25 ENCOUNTER — OFFICE VISIT (OUTPATIENT)
Dept: CARDIOLOGY | Facility: MEDICAL CENTER | Age: 74
End: 2024-09-25
Attending: PHYSICIAN ASSISTANT
Payer: MEDICARE

## 2024-09-25 VITALS
RESPIRATION RATE: 16 BRPM | SYSTOLIC BLOOD PRESSURE: 124 MMHG | HEIGHT: 69 IN | DIASTOLIC BLOOD PRESSURE: 68 MMHG | OXYGEN SATURATION: 99 % | WEIGHT: 152 LBS | HEART RATE: 46 BPM | BODY MASS INDEX: 22.51 KG/M2

## 2024-09-25 DIAGNOSIS — I77.810 ASCENDING AORTA DILATION (HCC): ICD-10-CM

## 2024-09-25 DIAGNOSIS — I25.10 CORONARY ARTERY DISEASE INVOLVING NATIVE CORONARY ARTERY OF NATIVE HEART WITHOUT ANGINA PECTORIS: ICD-10-CM

## 2024-09-25 DIAGNOSIS — Z95.5 STENTED CORONARY ARTERY: ICD-10-CM

## 2024-09-25 PROCEDURE — 99214 OFFICE O/P EST MOD 30 MIN: CPT | Performed by: PHYSICIAN ASSISTANT

## 2024-09-25 PROCEDURE — 99212 OFFICE O/P EST SF 10 MIN: CPT | Performed by: PHYSICIAN ASSISTANT

## 2024-09-25 RX ORDER — ATORVASTATIN CALCIUM 80 MG/1
80 TABLET, FILM COATED ORAL EVERY EVENING
Qty: 100 TABLET | Refills: 3 | Status: SHIPPED | OUTPATIENT
Start: 2024-09-25

## 2024-09-25 RX ORDER — ASPIRIN 81 MG/1
81 TABLET ORAL DAILY
COMMUNITY
Start: 2024-09-25

## 2024-09-25 ASSESSMENT — ENCOUNTER SYMPTOMS
PND: 0
PALPITATIONS: 0
DIZZINESS: 0
ORTHOPNEA: 0
SHORTNESS OF BREATH: 0

## 2024-09-25 ASSESSMENT — FIBROSIS 4 INDEX: FIB4 SCORE: 1.97

## 2024-09-30 ENCOUNTER — HOSPITAL ENCOUNTER (OUTPATIENT)
Dept: LAB | Facility: MEDICAL CENTER | Age: 74
End: 2024-09-30
Attending: PHYSICIAN ASSISTANT
Payer: MEDICARE

## 2024-09-30 DIAGNOSIS — I25.10 CORONARY ARTERY DISEASE INVOLVING NATIVE CORONARY ARTERY OF NATIVE HEART WITHOUT ANGINA PECTORIS: ICD-10-CM

## 2024-09-30 LAB
CHOLEST SERPL-MCNC: 119 MG/DL (ref 100–199)
HDLC SERPL-MCNC: 69 MG/DL
LDLC SERPL CALC-MCNC: 45 MG/DL
TRIGL SERPL-MCNC: 27 MG/DL (ref 0–149)

## 2024-09-30 PROCEDURE — 36415 COLL VENOUS BLD VENIPUNCTURE: CPT

## 2024-09-30 PROCEDURE — 80061 LIPID PANEL: CPT

## 2024-11-27 ENCOUNTER — HOSPITAL ENCOUNTER (OUTPATIENT)
Dept: RADIOLOGY | Facility: MEDICAL CENTER | Age: 74
End: 2024-11-27
Attending: ORTHOPAEDIC SURGERY
Payer: MEDICARE

## 2024-11-27 DIAGNOSIS — Z96.641 HISTORY OF TOTAL RIGHT HIP REPLACEMENT: ICD-10-CM

## 2024-11-27 PROCEDURE — 73700 CT LOWER EXTREMITY W/O DYE: CPT | Mod: RT

## 2025-03-03 ENCOUNTER — APPOINTMENT (OUTPATIENT)
Dept: MEDICAL GROUP | Facility: LAB | Age: 75
End: 2025-03-03
Payer: MEDICARE

## 2025-03-03 VITALS
TEMPERATURE: 97.1 F | SYSTOLIC BLOOD PRESSURE: 118 MMHG | WEIGHT: 163.58 LBS | RESPIRATION RATE: 14 BRPM | HEIGHT: 69 IN | BODY MASS INDEX: 24.23 KG/M2 | HEART RATE: 48 BPM | DIASTOLIC BLOOD PRESSURE: 60 MMHG | OXYGEN SATURATION: 99 %

## 2025-03-03 DIAGNOSIS — Z00.00 MEDICARE ANNUAL WELLNESS VISIT, SUBSEQUENT: ICD-10-CM

## 2025-03-03 DIAGNOSIS — E78.5 DYSLIPIDEMIA: ICD-10-CM

## 2025-03-03 DIAGNOSIS — Z12.5 PROSTATE CANCER SCREENING: ICD-10-CM

## 2025-03-03 DIAGNOSIS — R73.9 ELEVATED BLOOD SUGAR: ICD-10-CM

## 2025-03-03 PROCEDURE — 3078F DIAST BP <80 MM HG: CPT | Performed by: STUDENT IN AN ORGANIZED HEALTH CARE EDUCATION/TRAINING PROGRAM

## 2025-03-03 PROCEDURE — G0439 PPPS, SUBSEQ VISIT: HCPCS | Performed by: STUDENT IN AN ORGANIZED HEALTH CARE EDUCATION/TRAINING PROGRAM

## 2025-03-03 PROCEDURE — 3074F SYST BP LT 130 MM HG: CPT | Performed by: STUDENT IN AN ORGANIZED HEALTH CARE EDUCATION/TRAINING PROGRAM

## 2025-03-03 ASSESSMENT — ACTIVITIES OF DAILY LIVING (ADL): BATHING_REQUIRES_ASSISTANCE: 0

## 2025-03-03 ASSESSMENT — FIBROSIS 4 INDEX: FIB4 SCORE: 1.97

## 2025-03-03 ASSESSMENT — PATIENT HEALTH QUESTIONNAIRE - PHQ9: CLINICAL INTERPRETATION OF PHQ2 SCORE: 0

## 2025-03-03 ASSESSMENT — ENCOUNTER SYMPTOMS: GENERAL WELL-BEING: EXCELLENT

## 2025-03-03 NOTE — PROGRESS NOTES
Chief Complaint   Patient presents with    Annual Wellness Visit       HPI:  Vasile Bowers is a 74 y.o. here for Medicare Annual Wellness Visit     Patient Active Problem List    Diagnosis Date Noted    Other specified anemias 01/18/2024    Elevated CPK 12/02/2023    Right elbow pain 12/01/2023    Periprosthetic fracture of hip, initial encounter 11/29/2023    Traumatic hematoma of hip, right, initial encounter 11/29/2023    Preoperative cardiovascular examination 11/29/2023    Acute blood loss anemia 11/29/2023    Hyperlipidemia 11/29/2023    Primary osteoarthritis of right hip 07/11/2023    Chronic right hip pain 07/11/2023    Risk for falls 03/14/2023    Injury of adductor muscle and tendon of thigh, initial encounter 01/24/2023    Sacroiliitis (HCC) 04/26/2022    Lumbar spondylosis 03/29/2022    Glenohumeral arthritis, unspecified laterality 03/29/2022    Bradycardia 12/07/2021    Coronary artery disease involving native coronary artery of native heart without angina pectoris 08/10/2021    Ascending aorta dilation (HCC)     Essential hypertension, benign     Thrombocytopenia (HCC) 07/25/2021    History of non-ST elevation myocardial infarction (NSTEMI) 07/25/2021    Lumbar stenosis 06/01/2021    Patellar tendinitis 07/16/2012    Bursitis, knee 06/25/2012    Bilateral shoulder pain 11/09/2009       Current Outpatient Medications   Medication Sig Dispense Refill    meloxicam (MOBIC) 7.5 MG Tab Take 1 Tablet by mouth every day. 30 Tablet 1    Calcium Acetate, Phos Binder, (CALCIUM ACETATE PO) Take  by mouth.      B Complex Vitamins (B COMPLEX PO) Take  by mouth.      aspirin 81 MG EC tablet Take 1 Tablet by mouth every day.      atorvastatin (LIPITOR) 80 MG tablet Take 1 Tablet by mouth every evening. 100 Tablet 3    VITAMIN K PO Take 1 Tablet by mouth every day.      Glucosamine-Chondroit-Vit C-Mn (GLUCOSAMINE 1500 COMPLEX PO) Take 1 Tablet by mouth every day.      Turmeric 500 MG Cap Take 1 Capsule by  mouth every day.      Cholecalciferol (VITAMIN D3) Take 1 Capsule by mouth every day.      Ascorbic Acid (VITAMIN C PO) Take 1 tablet by mouth every day.       No current facility-administered medications for this visit.          Current supplements as per medication list.     Allergies: Patient has no known allergies.    Current social contact/activities:      He  reports that he quit smoking about 47 years ago. His smoking use included cigarettes. He quit smokeless tobacco use about 53 years ago.  His smokeless tobacco use included chew. He reports that he does not currently use alcohol. He reports that he does not use drugs.  Counseling given: Not Answered      ROS:    Gait: Uses no assistive device  Ostomy: No  Other tubes: No  Amputations: No  Chronic oxygen use: No  Last eye exam: 2024  Wears hearing aids: No   : Denies any urinary leakage during the last 6 months    Screening:    Depression Screening  Little interest or pleasure in doing things?  0 - not at all  Feeling down, depressed , or hopeless? 0 - not at all  Patient Health Questionnaire Score: 0     If depressive symptoms identified deferred to follow up visit unless specifically addressed in assessment and plan.    Interpretation of PHQ-9 Total Score   Score Severity   1-4 No Depression   5-9 Mild Depression   10-14 Moderate Depression   15-19 Moderately Severe Depression   20-27 Severe Depression    Screening for Cognitive Impairment  Do you or any of your friends or family members have any concern about your memory? No  Three Minute Recall (Village, Kitchen, Baby) 3/3    Alfredo clock face with all 12 numbers and set the hands to show 10 minutes past 11.  Yes    Cognitive concerns identified deferred for follow up unless specifically addressed in assessment and plan.    Fall Risk Assessment  Has the patient had two or more falls in the last year or any fall with injury in the last year?  No    Safety Assessment  Do you always wear your seatbelt?   Yes  Any changes to home needed to function safely? No  Difficulty hearing.  No  Patient counseled about all safety risks that were identified.    Functional Assessment ADLs  Are there any barriers preventing you from cooking for yourself or meeting nutritional needs?  No.    Are there any barriers preventing you from driving safely or obtaining transportation?  No.    Are there any barriers preventing you from using a telephone or calling for help?  No    Are there any barriers preventing you from shopping?  No.    Are there any barriers preventing you from taking care of your own finances?  No    Are there any barriers preventing you from managing your medications?  No    Are there any barriers preventing you from showering, bathing or dressing yourself? No    Are there any barriers preventing you from doing housework or laundry? No  Are there any barriers preventing you from using the toilet?No  Are you currently engaging in any exercise or physical activity?  Yes. Ski, snowboard, stretching    Self-Assessment of Health  What is your perception of your health? Excellent    Do you sleep more than six hours a night? Yes    In the past 7 days, how much did pain keep you from doing your normal work? Some Back/hip/shoulders  Do you spend quality time with family or friends (virtually or in person)? Yes    Do you usually eat a heart healthy diet that constists of a variety of fruits, vegetables, whole grains and fiber? Yes    Do you eat foods high in fat and/or Fast Food more than three times per week? No    How concerned are you that your medical conditions are not being well managed? Not at all    Are you worried that in the next 2 months, you may not have stable housing that you own, rent, or stay in as part of a household? No      Advance Care Planning  Do you have an Advance Directive, Living Will, Durable Power of , or POLST? Yes  Advance Directive       is not on file - instructed patient to bring in a  copy to scan into their chart      Health Maintenance Summary            Current Care Gaps       Hepatitis B Vaccine (Hep B) (3 of 3 - Hep B Twinrix 3-dose series) Overdue since 3/8/2004      10/06/2003  Imm Admin: Hep A/HEP B Combined Vaccine (TwinRix)    09/08/2003  Imm Admin: Hep A/HEP B Combined Vaccine (TwinRix)              COVID-19 Vaccine (5 - 2024-25 season) Overdue since 9/1/2024 05/09/2022  Imm Admin: MODERNA SARS-COV-2 VACCINE (12+)    10/28/2021  Imm Admin: MODERNA SARS-COV-2 VACCINE (12+)    04/12/2021  Imm Admin: MODERNA SARS-COV-2 VACCINE (12+)    03/15/2021  Imm Admin: MODERNA SARS-COV-2 VACCINE (12+)              Hepatitis C Screening (Once) Never done     No completion history exists for this topic.                      Needs Review       Abdominal Aortic Aneurysm (AAA) Screening  Tentatively Complete      07/25/2021  CT-CTA COMPLETE THORACOABDOMINAL AORTA    02/20/2018  US-ABDOMINAL AORTA W/O DOPPLER              Colorectal Cancer Screening (Colonoscopy - Every 10 Years) Tentatively due on 1/31/2032 01/31/2022  AMB EXTERNAL COLONOSCOPY RESULTS    01/31/2022  Colonoscopy (Reason not specified - per outiside records)                      Upcoming       Influenza Vaccine (1) Postponed until 3/3/2026     No completion history exists for this topic.              Annual Wellness Visit (Yearly) Next due on 3/3/2026      03/03/2025  Visit Dx: Medicare annual wellness visit, subsequent    01/18/2024  Visit Dx: Medicare annual wellness visit, subsequent    11/14/2022 11/12/2021 11/11/2020       Only the first 5 history entries have been loaded, but more history exists.            IMM DTaP/Tdap/Td Vaccine (2 - Td or Tdap) Next due on 2/8/2028 02/08/2018  Imm Admin: Tdap Vaccine    09/08/2003  Imm Admin: TD Vaccine                      Completed or No Longer Recommended       Zoster (Shingles) Vaccines (Series Information) Completed      12/27/2022  Imm Admin: Zoster Vaccine  Recombinant (RZV) (SHINGRIX)    2021  Imm Admin: Zoster Vaccine Recombinant (RZV) (SHINGRIX)              Pneumococcal Vaccine: 50+ Years (Series Information) Completed      2019  Imm Admin: Pneumococcal polysaccharide vaccine (PPSV-23)    2018  Imm Admin: Pneumococcal Conjugate Vaccine (Prevnar/PCV-13)              Hepatitis A Vaccine (Hep A) (Series Information) Aged Out      10/06/2003  Imm Admin: Hep A/HEP B Combined Vaccine (TwinRix)    2003  Imm Admin: Hep A/HEP B Combined Vaccine (TwinRix)              HPV Vaccines (Series Information) Aged Out      No completion history exists for this topic.              Polio Vaccine (Inactivated Polio) (Series Information) Aged Out     No completion history exists for this topic.              Meningococcal Immunization (Series Information) Aged Out     No completion history exists for this topic.                            Patient Care Team:  Brandon Magallanes D.O. as PCP - General (Internal Medicine)        Social History     Tobacco Use    Smoking status: Former     Current packs/day: 0.00     Types: Cigarettes     Quit date:      Years since quittin.2    Smokeless tobacco: Former     Types: Chew     Quit date:    Vaping Use    Vaping status: Never Used   Substance Use Topics    Alcohol use: Not Currently    Drug use: No     Family History   Problem Relation Age of Onset    Heart Disease Mother     Heart Failure Mother      He  has a past medical history of Ascending aorta dilation (HCC), CAD (coronary artery disease), Chronic pain, High cholesterol, and Myocardial infarct (HCC).   Past Surgical History:   Procedure Laterality Date    HIP REVISION TOTAL Right 2023    Procedure: REVISION, TOTAL ARTHROPLASTY, HIP, ORIF GREATER TROCHANTER;  Surgeon: Clark Clement M.D.;  Location: SURGERY [facility];  Service: Orthopedics    NH TOTAL HIP ARTHROPLASTY Right 2023    Procedure: RIGHT TOTAL HIP ARTHROPLASTY;  Surgeon: Clark  "GUILLERMO Clement;  Location: [facility];  Service: Orthopedics    PB INJECT RX OTHER PERIPH NERVE Right 5/30/2023    Procedure: RIGHT hip acetabular neurotomy of the femoral and obturator sensory nerves with fluoroscopic guidance with sedation.;  Surgeon: Brooke Brown M.D.;  [facility];  Service: Pain Management    MN INJ(S) NERVE BLOCK FEMORAL (INCLUDES IG) Right 5/2/2023    Procedure: Diagnostic RIGHT femoral and obturator nerve blocks with fluoroscopic guidance;  Surgeon: Brooke Brown M.D.;  Location: [facility];  Service: Pain Management    MN DRAIN/INJECT LARGE JOINT/BURSA Right 4/11/2023    Procedure: Diagnostic and therapeutic Fluoroscopically guided intra-articular RIGHT hip injection;  Surgeon: Brooke Brown M.D.;  Location: [facility];  Service: Pain Management    INJ,ANES LUMBAR/CERVICAL Right 3/21/2023    Procedure: Diagnostic medial branch blocks targeting the RIGHT L4-5 and L5-S1 facet joints with fluoroscopic guidance #1;  Surgeon: Brooke Brown M.D.;  Location: [facility];  Service: Pain Management    IRRIGATION & DEBRIDEMENT ORTHO Left 12/8/2021    Procedure: IRRIGATION AND DEBRIDEMENT, WOUND - HAND;  Surgeon: Guillermo Villalobos M.D.;  Location: SURGERY [facility];  Service: Orthopedics    ZZZ CARDIAC CATH  07/2021    PCI to OM1     KNEE ARTHROSCOPY      SHOULDER ARTHROSCOPY         Exam:   /60 (BP Location: Right arm, Patient Position: Sitting, BP Cuff Size: Adult)   Pulse (!) 48   Temp 36.2 °C (97.1 °F) (Temporal)   Resp 14   Ht 1.753 m (5' 9\")   Wt 74.2 kg (163 lb 9.3 oz)   SpO2 99%  Body mass index is 24.16 kg/m².    Hearing fair.    Dentition fair  Alert, oriented in no acute distress.  Eye contact is good, speech goal directed, affect calm    Assessment and Plan. The following treatment and monitoring plan is recommended:    1. Prostate cancer screening  - PROSTATE " SPECIFIC AG SCREENING; Future    2. Dyslipidemia  - Comp Metabolic Panel; Future  - CBC WITH DIFFERENTIAL; Future    3. Elevated blood sugar  - HEMOGLOBIN A1C; Future    4. Medicare annual wellness visit, subsequent      Services suggested: No services needed at this time  Health Care Screening: Age-appropriate preventive services recommended by USPTF and ACIP covered by Medicare were discussed today. Services ordered if indicated and agreed upon by the patient.  Referrals offered: Community-based lifestyle interventions to reduce health risks and promote self-management and wellness, fall prevention, nutrition, physical activity, tobacco-use cessation, weight loss, and mental health services as per orders if indicated.    Discussion today about general wellness and lifestyle habits:    Prevent falls and reduce trip hazards; Cautioned about securing or removing rugs.  Have a working fire alarm and carbon monoxide detector;   Engage in regular physical activity and social activities     Follow-up: 1 year annual

## 2025-03-08 ENCOUNTER — HOSPITAL ENCOUNTER (OUTPATIENT)
Dept: LAB | Facility: MEDICAL CENTER | Age: 75
End: 2025-03-08
Attending: STUDENT IN AN ORGANIZED HEALTH CARE EDUCATION/TRAINING PROGRAM
Payer: MEDICARE

## 2025-03-08 DIAGNOSIS — E78.5 DYSLIPIDEMIA: ICD-10-CM

## 2025-03-08 DIAGNOSIS — R73.9 ELEVATED BLOOD SUGAR: ICD-10-CM

## 2025-03-08 DIAGNOSIS — Z12.5 PROSTATE CANCER SCREENING: ICD-10-CM

## 2025-03-08 LAB
ALBUMIN SERPL BCP-MCNC: 4.3 G/DL (ref 3.2–4.9)
ALBUMIN/GLOB SERPL: 1.6 G/DL
ALP SERPL-CCNC: 122 U/L (ref 30–99)
ALT SERPL-CCNC: 31 U/L (ref 2–50)
ANION GAP SERPL CALC-SCNC: 11 MMOL/L (ref 7–16)
AST SERPL-CCNC: 29 U/L (ref 12–45)
BASOPHILS # BLD AUTO: 0.2 % (ref 0–1.8)
BASOPHILS # BLD: 0.04 K/UL (ref 0–0.12)
BILIRUB SERPL-MCNC: 0.5 MG/DL (ref 0.1–1.5)
BUN SERPL-MCNC: 26 MG/DL (ref 8–22)
CALCIUM ALBUM COR SERPL-MCNC: 9.9 MG/DL (ref 8.5–10.5)
CALCIUM SERPL-MCNC: 10.1 MG/DL (ref 8.5–10.5)
CHLORIDE SERPL-SCNC: 106 MMOL/L (ref 96–112)
CO2 SERPL-SCNC: 23 MMOL/L (ref 20–33)
CREAT SERPL-MCNC: 1.16 MG/DL (ref 0.5–1.4)
EOSINOPHIL # BLD AUTO: 0 K/UL (ref 0–0.51)
EOSINOPHIL NFR BLD: 0 % (ref 0–6.9)
ERYTHROCYTE [DISTWIDTH] IN BLOOD BY AUTOMATED COUNT: 48.6 FL (ref 35.9–50)
EST. AVERAGE GLUCOSE BLD GHB EST-MCNC: 94 MG/DL
GFR SERPLBLD CREATININE-BSD FMLA CKD-EPI: 66 ML/MIN/1.73 M 2
GLOBULIN SER CALC-MCNC: 2.7 G/DL (ref 1.9–3.5)
GLUCOSE SERPL-MCNC: 117 MG/DL (ref 65–99)
HBA1C MFR BLD: 4.9 % (ref 4–5.6)
HCT VFR BLD AUTO: 43.3 % (ref 42–52)
HGB BLD-MCNC: 14.7 G/DL (ref 14–18)
IMM GRANULOCYTES # BLD AUTO: 0.12 K/UL (ref 0–0.11)
IMM GRANULOCYTES NFR BLD AUTO: 0.7 % (ref 0–0.9)
LYMPHOCYTES # BLD AUTO: 0.79 K/UL (ref 1–4.8)
LYMPHOCYTES NFR BLD: 4.4 % (ref 22–41)
MCH RBC QN AUTO: 34.2 PG (ref 27–33)
MCHC RBC AUTO-ENTMCNC: 33.9 G/DL (ref 32.3–36.5)
MCV RBC AUTO: 100.7 FL (ref 81.4–97.8)
MONOCYTES # BLD AUTO: 1.07 K/UL (ref 0–0.85)
MONOCYTES NFR BLD AUTO: 6 % (ref 0–13.4)
NEUTROPHILS # BLD AUTO: 15.78 K/UL (ref 1.82–7.42)
NEUTROPHILS NFR BLD: 88.7 % (ref 44–72)
NRBC # BLD AUTO: 0 K/UL
NRBC BLD-RTO: 0 /100 WBC (ref 0–0.2)
PLATELET # BLD AUTO: 170 K/UL (ref 164–446)
PMV BLD AUTO: 10.8 FL (ref 9–12.9)
POTASSIUM SERPL-SCNC: 5.2 MMOL/L (ref 3.6–5.5)
PROT SERPL-MCNC: 7 G/DL (ref 6–8.2)
PSA SERPL DL<=0.01 NG/ML-MCNC: 0.54 NG/ML (ref 0–4)
RBC # BLD AUTO: 4.3 M/UL (ref 4.7–6.1)
SODIUM SERPL-SCNC: 140 MMOL/L (ref 135–145)
WBC # BLD AUTO: 17.8 K/UL (ref 4.8–10.8)

## 2025-03-08 PROCEDURE — 85025 COMPLETE CBC W/AUTO DIFF WBC: CPT

## 2025-03-08 PROCEDURE — 83036 HEMOGLOBIN GLYCOSYLATED A1C: CPT | Mod: GA

## 2025-03-08 PROCEDURE — 84153 ASSAY OF PSA TOTAL: CPT | Mod: GA

## 2025-03-08 PROCEDURE — 80053 COMPREHEN METABOLIC PANEL: CPT

## 2025-03-08 PROCEDURE — 36415 COLL VENOUS BLD VENIPUNCTURE: CPT | Mod: GA

## 2025-03-10 ENCOUNTER — RESULTS FOLLOW-UP (OUTPATIENT)
Dept: MEDICAL GROUP | Facility: LAB | Age: 75
End: 2025-03-10
Payer: MEDICARE

## 2025-06-18 NOTE — PROGRESS NOTES
Bedside report received from night RN. Assumed care of patient. Alert and oriented X4, resting comfortably in bed. On RA, no SOB noted. Daily plan of care discussed. Pt denies intolerable pain to left right hip, polar ice in place. Right hip aquacel dressing with old drainage. Safety precautions in place. Hourly rounding ongoing.       No

## 2025-08-18 ENCOUNTER — APPOINTMENT (OUTPATIENT)
Dept: URBAN - METROPOLITAN AREA CLINIC 6 | Facility: CLINIC | Age: 75
Setting detail: DERMATOLOGY
End: 2025-08-18

## 2025-08-18 DIAGNOSIS — L81.4 OTHER MELANIN HYPERPIGMENTATION: ICD-10-CM

## 2025-08-18 DIAGNOSIS — D22 MELANOCYTIC NEVI: ICD-10-CM

## 2025-08-18 DIAGNOSIS — L57.0 ACTINIC KERATOSIS: ICD-10-CM

## 2025-08-18 DIAGNOSIS — L82.1 OTHER SEBORRHEIC KERATOSIS: ICD-10-CM

## 2025-08-18 DIAGNOSIS — Z85.828 PERSONAL HISTORY OF OTHER MALIGNANT NEOPLASM OF SKIN: ICD-10-CM

## 2025-08-18 DIAGNOSIS — D18.0 HEMANGIOMA: ICD-10-CM

## 2025-08-18 DIAGNOSIS — Z71.89 OTHER SPECIFIED COUNSELING: ICD-10-CM

## 2025-08-18 PROBLEM — D22.61 MELANOCYTIC NEVI OF RIGHT UPPER LIMB, INCLUDING SHOULDER: Status: ACTIVE | Noted: 2025-08-18

## 2025-08-18 PROBLEM — D22.62 MELANOCYTIC NEVI OF LEFT UPPER LIMB, INCLUDING SHOULDER: Status: ACTIVE | Noted: 2025-08-18

## 2025-08-18 PROBLEM — D22.5 MELANOCYTIC NEVI OF TRUNK: Status: ACTIVE | Noted: 2025-08-18

## 2025-08-18 PROBLEM — D48.5 NEOPLASM OF UNCERTAIN BEHAVIOR OF SKIN: Status: ACTIVE | Noted: 2025-08-18

## 2025-08-18 PROBLEM — D22.72 MELANOCYTIC NEVI OF LEFT LOWER LIMB, INCLUDING HIP: Status: ACTIVE | Noted: 2025-08-18

## 2025-08-18 PROBLEM — D22.71 MELANOCYTIC NEVI OF RIGHT LOWER LIMB, INCLUDING HIP: Status: ACTIVE | Noted: 2025-08-18

## 2025-08-18 PROBLEM — D18.01 HEMANGIOMA OF SKIN AND SUBCUTANEOUS TISSUE: Status: ACTIVE | Noted: 2025-08-18

## 2025-08-18 PROCEDURE — ? COUNSELING

## 2025-08-18 PROCEDURE — ? BIOPSY BY SHAVE METHOD

## 2025-08-18 ASSESSMENT — LOCATION DETAILED DESCRIPTION DERM
LOCATION DETAILED: RIGHT PROXIMAL CALF
LOCATION DETAILED: RIGHT VENTRAL PROXIMAL FOREARM
LOCATION DETAILED: RIGHT SUPERIOR PREAURICULAR CHEEK
LOCATION DETAILED: LEFT DISTAL POSTERIOR THIGH
LOCATION DETAILED: RIGHT MEDIAL INFERIOR CHEST
LOCATION DETAILED: RIGHT SUPERIOR CRUS OF ANTIHELIX
LOCATION DETAILED: RIGHT MID-UPPER BACK
LOCATION DETAILED: RIGHT DISTAL POSTERIOR THIGH
LOCATION DETAILED: LEFT PROXIMAL DORSAL FOREARM
LOCATION DETAILED: LEFT INFERIOR UPPER BACK
LOCATION DETAILED: LEFT PROXIMAL CALF
LOCATION DETAILED: LEFT ANTERIOR PROXIMAL THIGH
LOCATION DETAILED: LEFT VENTRAL DISTAL FOREARM
LOCATION DETAILED: RIGHT SUPERIOR LATERAL MIDBACK
LOCATION DETAILED: RIGHT PROXIMAL DORSAL FOREARM
LOCATION DETAILED: LEFT ANTERIOR DISTAL UPPER ARM
LOCATION DETAILED: RIGHT ANTERIOR DISTAL UPPER ARM
LOCATION DETAILED: RIGHT VENTRAL DISTAL FOREARM
LOCATION DETAILED: RIGHT ANTERIOR PROXIMAL THIGH
LOCATION DETAILED: LEFT CENTRAL MALAR CHEEK

## 2025-08-18 ASSESSMENT — LOCATION SIMPLE DESCRIPTION DERM
LOCATION SIMPLE: LEFT CHEEK
LOCATION SIMPLE: RIGHT POSTERIOR THIGH
LOCATION SIMPLE: RIGHT LOWER BACK
LOCATION SIMPLE: RIGHT UPPER BACK
LOCATION SIMPLE: LEFT UPPER ARM
LOCATION SIMPLE: RIGHT CHEEK
LOCATION SIMPLE: RIGHT FOREARM
LOCATION SIMPLE: LEFT POSTERIOR THIGH
LOCATION SIMPLE: RIGHT UPPER ARM
LOCATION SIMPLE: LEFT FOREARM
LOCATION SIMPLE: CHEST
LOCATION SIMPLE: RIGHT THIGH
LOCATION SIMPLE: LEFT THIGH
LOCATION SIMPLE: LEFT CALF
LOCATION SIMPLE: RIGHT CALF
LOCATION SIMPLE: RIGHT EAR
LOCATION SIMPLE: LEFT UPPER BACK

## 2025-08-18 ASSESSMENT — LOCATION ZONE DERM
LOCATION ZONE: ARM
LOCATION ZONE: TRUNK
LOCATION ZONE: FACE
LOCATION ZONE: LEG
LOCATION ZONE: EAR

## 2025-09-02 ENCOUNTER — APPOINTMENT (OUTPATIENT)
Dept: URBAN - METROPOLITAN AREA CLINIC 36 | Facility: CLINIC | Age: 75
Setting detail: DERMATOLOGY
End: 2025-09-02

## 2025-09-02 PROCEDURE — ? MOHS SURGERY

## (undated) DEVICE — COVER LIGHT HANDLE FLEXIBLE - SOFT (2EA/PK 80PK/CA)

## (undated) DEVICE — NEPTUNE 4 PORT MANIFOLD - (20/PK)

## (undated) DEVICE — DRESSING AQUACEL AG ADVANTAGE 3.5 X 10" (10EA/BX)"

## (undated) DEVICE — PACK LOWER EXTREMITY - (2/CA)

## (undated) DEVICE — GLOVE BIOGEL INDICATOR SZ 8 SURGICAL PF LTX - (50/BX 4BX/CA)

## (undated) DEVICE — SUCTION INSTRUMENT YANKAUER BULBOUS TIP W/O VENT (50EA/CA)

## (undated) DEVICE — CHLORAPREP 26 ML APPLICATOR - ORANGE TINT(25/CA)

## (undated) DEVICE — GLOVE, LITE (PAIR)

## (undated) DEVICE — SUTURE GENERAL

## (undated) DEVICE — SYRINGE 30 ML LL (56/BX)

## (undated) DEVICE — SENSOR OXIMETER ADULT SPO2 RD SET (20EA/BX)

## (undated) DEVICE — CANISTER SUCTION RIGID RED 1500CC (40EA/CA)

## (undated) DEVICE — SODIUM CHL IRRIGATION 0.9% 1000ML (12EA/CA)

## (undated) DEVICE — ELECTRODE DUAL RETURN W/ CORD - (50/PK)

## (undated) DEVICE — BLADE SAGITTAL SAW DUAL CUT 75.0 X 25.0MM (1/EA)

## (undated) DEVICE — Device

## (undated) DEVICE — BLADE SURGICAL #15 - (50/BX 3BX/CA)

## (undated) DEVICE — NEEDLE NON-SAFETY HYPO 21 GA X 1 1/2 IN HYPO (100/BX)

## (undated) DEVICE — SLEEVE VASO CALF MED - (10PR/CA)

## (undated) DEVICE — SODIUM CHL. IRRIGATION 0.9% 3000ML (4EA/CA 65CA/PF)

## (undated) DEVICE — STAPLER SKIN DISP - (6/BX 10BX/CA) VISISTAT

## (undated) DEVICE — TUBING CLEARLINK DUO-VENT - C-FLO (48EA/CA)

## (undated) DEVICE — SUTURE 4-0 CHROMIC FS-2 (36EA/BX)

## (undated) DEVICE — TOWEL STOP TIMEOUT SAFETY FLAG (40EA/CA)

## (undated) DEVICE — SWAB ANAEROBIC SPEC.COLLECTOR - (25/PK 4PK/CA 100EA/CA)

## (undated) DEVICE — LACTATED RINGERS INJ 1000 ML - (14EA/CA 60CA/PF)

## (undated) DEVICE — SUTURE 2-0 VICRYL PLUS CT-1 - 8 X 18 INCH(12/BX)

## (undated) DEVICE — SET EXTENSION WITH 2 PORTS (48EA/CA) ***PART #2C8610 IS A SUBSTITUTE*****

## (undated) DEVICE — PAD PREP 24 X 48 CUFFED - (100/CA)

## (undated) DEVICE — LENS/HOOD FOR SPACESUIT - (32/PK) PEEL AWAY FACE

## (undated) DEVICE — BRUSH FMCNL TIP IRR STRL - (12/PKG) FOR SURGILAV

## (undated) DEVICE — PACK TOTAL HIP - (1/CA)

## (undated) DEVICE — GLOVE BIOGEL PI ORTHO SZ 8 PF LF (40PR/BX)

## (undated) DEVICE — SUTURE 4-0 ETHILON FS-2 18 (36PK/BX)"

## (undated) DEVICE — GOWN WARMING STANDARD FLEX - (30/CA)

## (undated) DEVICE — HANDPIECE 10FT INTPLS SCT PLS IRRIGATION HAND CONTROL SET (6/PK)

## (undated) DEVICE — SUTURE 1 VICRYL PLUS CTX - 8 X 18 INCH (12/BX)

## (undated) DEVICE — HUMID-VENT HEAT AND MOISTURE EXCHANGE- (50/BX)

## (undated) DEVICE — SUTURE 5 TI-CRON HOS-14 - (36/BX)

## (undated) DEVICE — TUBE, CULTURE AEROBIC

## (undated) DEVICE — TIP INTPLS HFLO ML ORFC BTRY - (12/CS)  FOR SURGILAV

## (undated) DEVICE — DRAPE LARGE 3 QUARTER - (20/CA)

## (undated) DEVICE — GLOVE BIOGEL PI ORTHO SZ 7 PF LF (40PR/BX)

## (undated) DEVICE — GLOVE BIOGEL SZ 7 SURGICAL PF LTX - (50PR/BX 4BX/CA)

## (undated) DEVICE — GLOVE BIOGEL SZ 8 SURGICAL PF LTX - (50PR/BX 4BX/CA)